# Patient Record
Sex: MALE | Race: OTHER | HISPANIC OR LATINO | Employment: STUDENT | URBAN - METROPOLITAN AREA
[De-identification: names, ages, dates, MRNs, and addresses within clinical notes are randomized per-mention and may not be internally consistent; named-entity substitution may affect disease eponyms.]

---

## 2017-02-22 ENCOUNTER — ALLSCRIPTS OFFICE VISIT (OUTPATIENT)
Dept: OTHER | Facility: OTHER | Age: 32
End: 2017-02-22

## 2017-02-22 DIAGNOSIS — Z13.6 ENCOUNTER FOR SCREENING FOR CARDIOVASCULAR DISORDERS: ICD-10-CM

## 2017-04-14 ENCOUNTER — GENERIC CONVERSION - ENCOUNTER (OUTPATIENT)
Dept: OTHER | Facility: OTHER | Age: 32
End: 2017-04-14

## 2017-04-14 ENCOUNTER — TRANSCRIBE ORDERS (OUTPATIENT)
Dept: LAB | Facility: CLINIC | Age: 32
End: 2017-04-14

## 2017-04-14 ENCOUNTER — APPOINTMENT (OUTPATIENT)
Dept: LAB | Facility: CLINIC | Age: 32
End: 2017-04-14
Payer: COMMERCIAL

## 2017-04-14 DIAGNOSIS — Z00.00 ENCOUNTER FOR GENERAL ADULT MEDICAL EXAMINATION WITHOUT ABNORMAL FINDINGS: ICD-10-CM

## 2017-04-14 LAB — RUBV IGG SERPL IA-ACNC: 131.2 IU/ML

## 2017-04-14 PROCEDURE — 86706 HEP B SURFACE ANTIBODY: CPT

## 2017-04-14 PROCEDURE — 86803 HEPATITIS C AB TEST: CPT

## 2017-04-14 PROCEDURE — 36415 COLL VENOUS BLD VENIPUNCTURE: CPT

## 2017-04-14 PROCEDURE — 86787 VARICELLA-ZOSTER ANTIBODY: CPT

## 2017-04-14 PROCEDURE — 86735 MUMPS ANTIBODY: CPT

## 2017-04-14 PROCEDURE — 86762 RUBELLA ANTIBODY: CPT

## 2017-04-14 PROCEDURE — 86704 HEP B CORE ANTIBODY TOTAL: CPT

## 2017-04-14 PROCEDURE — 86765 RUBEOLA ANTIBODY: CPT

## 2017-04-15 LAB
HBV CORE AB SER QL: NORMAL
HBV SURFACE AB SER-ACNC: 791.24 MIU/ML
HCV AB SER QL: NORMAL

## 2017-04-17 ENCOUNTER — GENERIC CONVERSION - ENCOUNTER (OUTPATIENT)
Dept: OTHER | Facility: OTHER | Age: 32
End: 2017-04-17

## 2017-04-18 LAB
MEV IGG SER QL: NORMAL
MUV IGG SER QL: NORMAL
VZV IGG SER IA-ACNC: NORMAL

## 2017-07-20 ENCOUNTER — APPOINTMENT (OUTPATIENT)
Dept: LAB | Facility: CLINIC | Age: 32
End: 2017-07-20
Payer: COMMERCIAL

## 2017-07-20 ENCOUNTER — TRANSCRIBE ORDERS (OUTPATIENT)
Dept: LAB | Facility: CLINIC | Age: 32
End: 2017-07-20

## 2017-07-20 DIAGNOSIS — Z00.8 HEALTH EXAMINATION IN POPULATION SURVEY: Primary | ICD-10-CM

## 2017-07-20 LAB
CHOLEST SERPL-MCNC: 185 MG/DL (ref 50–200)
EST. AVERAGE GLUCOSE BLD GHB EST-MCNC: 123 MG/DL
HBA1C MFR BLD: 5.9 % (ref 4.2–6.3)
HDLC SERPL-MCNC: 35 MG/DL (ref 40–60)
LDLC SERPL CALC-MCNC: 109 MG/DL (ref 0–100)
TRIGL SERPL-MCNC: 203 MG/DL

## 2017-07-20 PROCEDURE — 80061 LIPID PANEL: CPT

## 2017-07-20 PROCEDURE — 83036 HEMOGLOBIN GLYCOSYLATED A1C: CPT

## 2017-07-20 PROCEDURE — 36415 COLL VENOUS BLD VENIPUNCTURE: CPT

## 2017-08-07 ENCOUNTER — ALLSCRIPTS OFFICE VISIT (OUTPATIENT)
Dept: OTHER | Facility: OTHER | Age: 32
End: 2017-08-07

## 2017-11-03 ENCOUNTER — APPOINTMENT (OUTPATIENT)
Dept: RADIOLOGY | Facility: MEDICAL CENTER | Age: 32
End: 2017-11-03
Payer: COMMERCIAL

## 2017-11-03 ENCOUNTER — TRANSCRIBE ORDERS (OUTPATIENT)
Dept: ADMINISTRATIVE | Facility: HOSPITAL | Age: 32
End: 2017-11-03

## 2017-11-03 ENCOUNTER — ALLSCRIPTS OFFICE VISIT (OUTPATIENT)
Dept: OTHER | Facility: OTHER | Age: 32
End: 2017-11-03

## 2017-11-03 ENCOUNTER — GENERIC CONVERSION - ENCOUNTER (OUTPATIENT)
Dept: OTHER | Facility: OTHER | Age: 32
End: 2017-11-03

## 2017-11-03 DIAGNOSIS — M12.539: ICD-10-CM

## 2017-11-03 PROCEDURE — 73110 X-RAY EXAM OF WRIST: CPT

## 2017-11-04 NOTE — PROGRESS NOTES
Assessment  1  Traumatic arthritis of wrist (716 13) (M12 539)    Plan  Traumatic arthritis of wrist    · Diclofenac Sodium 75 MG Oral Tablet Delayed Release; TAKE 1 TABLET Twice  daily with food   · * XR WRIST 3+ VIEW RIGHT; Status:Active; Requested PEN:[id number];     Discussion/Summary    1  Traumatic arthropathy of the right wrist-recommend x-ray stat to rule out fracture  He has very limited range of motion and swelling  We will start on diclofenac 75 mg twice daily and consider referral to hand specialist if might not better or depending on x-ray results  Chief Complaint  pt is here for right wrist pain since tuesday   he states he attempted to pick his daughter tuesday and experienced a throbbing pain and loss of range of motion   cd      History of Present Illness  HPI: This is a 57-year-old gentleman that presents to the office with history is of arthritis in his wrist  He states that he has had previous flare-ups that seem to come and go however he has tried all of his normal regimens with icy Hot and NSAIDs which have not helped  He states that he was 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 the other day, moving his washing was seen [employer] and in have a problem until he lifted his daughter to put her on the couch  She is only 3years old about 20 lb but he felt a sudden pop and snap in his wrist and has had trouble moving it since  It has become red and swollen immediately  He does not have any numbness or tingling in his fingers  Review of Systems    Constitutional: no fever,-- not feeling poorly,-- no chills-- and-- not feeling tired  ENT: no nosebleeds-- and-- no nasal discharge  Cardiovascular: no chest pain-- and-- no palpitations  Respiratory: no shortness of breath,-- no cough-- and-- no wheezing  Gastrointestinal: no abdominal pain-- and-- no constipation  Musculoskeletal: no arthralgias,-- no joint swelling,-- no myalgias-- and-- no joint stiffness     Neurological: no headache-- and-- no confusion  Active Problems  1  Elevated blood pressure reading (796 2) (R03 0)   2  Myalgia (729 1) (M79 1)   3  Screening for heart disease (V81 2) (Z13 6)   4  Weight gain (783 1) (R63 5)    Past Medical History  Active Problems And Past Medical History Reviewed: The active problems and past medical history were reviewed and updated today  Social History   ·    · No alcohol use   · No secondhand smoke exposure (V49 89) (Z78 9)   · Non-smoker (V49 89) (Z78 9)   · Student    Current Meds   1  Glucos-Chondroit-MSM Complex Oral Tablet Recorded   2  Vitamin C ER 1000 MG Oral Tablet Extended Release Recorded    The medication list was reviewed and updated today  Allergies  1  No Known Drug Allergies    Vitals   Recorded: [id number] 10:33AM   Heart Rate 76, L Radial   Pulse Quality Regular, L Radial   Systolic 282, LUE, Sitting   Diastolic 80, LUE, Sitting   Height 6 ft 3 in   Weight 328 lb 6 4 oz   BMI Calculated 41 05   BSA Calculated 2 71     Physical Exam    Constitutional   General appearance: No acute distress, well appearing and well nourished  Eyes   Conjunctiva and lids: No swelling, erythema, or discharge  Pupils and irises: Equal, round and reactive to light  Ears, Nose, Mouth, and Throat   External inspection of ears and nose: Normal     Otoscopic examination: Tympanic membrance translucent with normal light reflex  Canals patent without erythema  Nasal mucosa, septum, and turbinates: Normal without edema or erythema  Oropharynx: Normal with no erythema, edema, exudate or lesions  Pulmonary   Respiratory effort: No increased work of breathing or signs of respiratory distress  Auscultation of lungs: Clear to auscultation, equal breath sounds bilaterally, no wheezes, no rales, no rhonci  Cardiovascular   Auscultation of heart: Normal rate and rhythm, normal S1 and S2, without murmurs      Examination of extremities for edema and/or varicosities: Normal     Carotid pulses: Normal     Abdomen   Abdomen: Non-tender, no masses  Liver and spleen: No hepatomegaly or splenomegaly  Lymphatic   Palpation of lymph nodes in neck: No lymphadenopathy  Musculoskeletal   Gait and station: Normal     Digits and nails: Normal without clubbing or cyanosis  Inspection/palpation of joints, bones, and muscles: Abnormal  -- Very limited range of motion with the right wrist  There is tenderness to palpation along the distal ulnar aspect  Skin   Skin and subcutaneous tissue: Abnormal  -- Erythema and edema of the right wrist present  Neurologic   Reflexes: 2+ and symmetric  Sensation: No sensory loss  Psychiatric   Orientation to person, place and time: Normal     Mood and affect: Normal          Signatures   Electronically signed by : Yvette Lyons [facility]; Nov  3 2017 10:51AM EST                       (Author)    Electronically signed by :  BREANNE De La Cruz ; Nov  3 2017 11:18AM EST

## 2018-01-09 NOTE — RESULT NOTES
Verified Results  * XR WRIST 3+ VIEW RIGHT [id number] 11:29AM Felicity [place] Order Number: [id number]     Test Name Result Flag Reference   XR WRIST 3+ VW RIGHT (Report)     RIGHT WRIST     INDICATION:  Traumatic arthropathy of wrist  Pain distal ulna  COMPARISON: None     VIEWS: PA, lateral, and oblique     IMAGES: 3     FINDINGS:     There is no acute fracture or dislocation  No degenerative changes  No lytic or blastic lesions are seen  Soft tissues are unremarkable  IMPRESSION:     No acute osseous abnormality         Workstation performed: [id number]     Signed by:   Hever Odom DO   11/3/17

## 2018-01-12 VITALS
BODY MASS INDEX: 38.42 KG/M2 | SYSTOLIC BLOOD PRESSURE: 102 MMHG | HEIGHT: 75 IN | DIASTOLIC BLOOD PRESSURE: 84 MMHG | HEART RATE: 76 BPM | WEIGHT: 309 LBS

## 2018-01-12 VITALS
HEIGHT: 75 IN | SYSTOLIC BLOOD PRESSURE: 120 MMHG | DIASTOLIC BLOOD PRESSURE: 80 MMHG | BODY MASS INDEX: 39.17 KG/M2 | HEART RATE: 76 BPM | WEIGHT: 315 LBS

## 2018-01-13 VITALS
HEIGHT: 75 IN | BODY MASS INDEX: 39.17 KG/M2 | WEIGHT: 315 LBS | SYSTOLIC BLOOD PRESSURE: 118 MMHG | DIASTOLIC BLOOD PRESSURE: 84 MMHG | HEART RATE: 80 BPM

## 2018-01-15 NOTE — RESULT NOTES
Verified Results  (1) HEP C ANTIBODY [id number] 08:40AM [place] Order Number: [id number]     Test Name Result Flag Reference   HEPATITIS C ANTIBODY Non-reactive  Non-reactive

## 2018-01-16 NOTE — MISCELLANEOUS
Message  Return to work or school:   Nikkie Ureña is under my professional care  He was seen in my office on 11/3/17       Please excuse for Dr Ephraim Chase today 11/3/17  RUBIA DEL VALLE PA-C  Signatures   Electronically signed by :  Miguel Peters, ; Nov  3 2017 10:49AM EST                       (Author)

## 2018-01-22 VITALS
HEART RATE: 74 BPM | HEIGHT: 75 IN | SYSTOLIC BLOOD PRESSURE: 124 MMHG | BODY MASS INDEX: 39.17 KG/M2 | DIASTOLIC BLOOD PRESSURE: 88 MMHG | WEIGHT: 315 LBS

## 2018-02-21 ENCOUNTER — TELEPHONE (OUTPATIENT)
Dept: FAMILY MEDICINE CLINIC | Facility: CLINIC | Age: 33
End: 2018-02-21

## 2018-02-21 DIAGNOSIS — G89.29 CHRONIC WRIST PAIN, UNSPECIFIED LATERALITY: Primary | ICD-10-CM

## 2018-02-21 DIAGNOSIS — M25.539 CHRONIC WRIST PAIN, UNSPECIFIED LATERALITY: Primary | ICD-10-CM

## 2018-02-21 RX ORDER — DICLOFENAC SODIUM 75 MG/1
75 TABLET, DELAYED RELEASE ORAL 2 TIMES DAILY
Qty: 30 TABLET | Refills: 0 | Status: SHIPPED | OUTPATIENT
Start: 2018-02-21 | End: 2018-08-06 | Stop reason: SDUPTHER

## 2018-02-21 RX ORDER — DICLOFENAC SODIUM 75 MG/1
1 TABLET, DELAYED RELEASE ORAL 2 TIMES DAILY
COMMUNITY
Start: 2017-11-03 | End: 2018-02-21 | Stop reason: SDUPTHER

## 2018-02-21 NOTE — TELEPHONE ENCOUNTER
Pt notified his request has been approved  Declines ortho consult at present but states if pain returns he will consider it

## 2018-02-21 NOTE — TELEPHONE ENCOUNTER
Patient called because he said that he saw Leni Turner in November 2017 for wrist pain and was prescribed Diclosenac 75 mg which he finished, but he is now with the wrist pain again and asked if Leni Turner would give him a refill of the medication that was prescribed for him? Call patient to let him know if this can be done

## 2018-08-06 DIAGNOSIS — G89.29 CHRONIC WRIST PAIN, UNSPECIFIED LATERALITY: ICD-10-CM

## 2018-08-06 DIAGNOSIS — M25.539 CHRONIC WRIST PAIN, UNSPECIFIED LATERALITY: ICD-10-CM

## 2018-08-06 RX ORDER — DICLOFENAC SODIUM 75 MG/1
75 TABLET, DELAYED RELEASE ORAL 2 TIMES DAILY
Qty: 30 TABLET | Refills: 0 | Status: SHIPPED | OUTPATIENT
Start: 2018-08-06 | End: 2018-08-18 | Stop reason: SDUPTHER

## 2018-08-18 DIAGNOSIS — G89.29 CHRONIC WRIST PAIN, UNSPECIFIED LATERALITY: ICD-10-CM

## 2018-08-18 DIAGNOSIS — M25.539 CHRONIC WRIST PAIN, UNSPECIFIED LATERALITY: ICD-10-CM

## 2018-08-20 RX ORDER — DICLOFENAC SODIUM 75 MG/1
75 TABLET, DELAYED RELEASE ORAL 2 TIMES DAILY
Qty: 30 TABLET | Refills: 0 | Status: SHIPPED | OUTPATIENT
Start: 2018-08-20 | End: 2018-09-19 | Stop reason: SDUPTHER

## 2018-09-04 DIAGNOSIS — M25.539 CHRONIC WRIST PAIN, UNSPECIFIED LATERALITY: ICD-10-CM

## 2018-09-04 DIAGNOSIS — G89.29 CHRONIC WRIST PAIN, UNSPECIFIED LATERALITY: ICD-10-CM

## 2018-09-04 RX ORDER — DICLOFENAC SODIUM 75 MG/1
75 TABLET, DELAYED RELEASE ORAL 2 TIMES DAILY
Qty: 30 TABLET | Refills: 0 | OUTPATIENT
Start: 2018-09-04 | End: 2018-09-19

## 2018-09-19 DIAGNOSIS — G89.29 CHRONIC WRIST PAIN, UNSPECIFIED LATERALITY: ICD-10-CM

## 2018-09-19 DIAGNOSIS — M25.539 CHRONIC WRIST PAIN, UNSPECIFIED LATERALITY: ICD-10-CM

## 2018-09-19 RX ORDER — DICLOFENAC SODIUM 75 MG/1
75 TABLET, DELAYED RELEASE ORAL 2 TIMES DAILY
Qty: 30 TABLET | Refills: 0 | Status: SHIPPED | OUTPATIENT
Start: 2018-09-19 | End: 2018-10-07 | Stop reason: SDUPTHER

## 2018-09-28 ENCOUNTER — APPOINTMENT (OUTPATIENT)
Dept: LAB | Facility: CLINIC | Age: 33
End: 2018-09-28
Payer: COMMERCIAL

## 2018-09-28 ENCOUNTER — OFFICE VISIT (OUTPATIENT)
Dept: FAMILY MEDICINE CLINIC | Facility: CLINIC | Age: 33
End: 2018-09-28
Payer: COMMERCIAL

## 2018-09-28 VITALS
RESPIRATION RATE: 20 BRPM | HEIGHT: 75 IN | HEART RATE: 80 BPM | SYSTOLIC BLOOD PRESSURE: 122 MMHG | WEIGHT: 315 LBS | BODY MASS INDEX: 39.17 KG/M2 | DIASTOLIC BLOOD PRESSURE: 78 MMHG

## 2018-09-28 DIAGNOSIS — Z00.00 HEALTH CARE MAINTENANCE: Primary | ICD-10-CM

## 2018-09-28 DIAGNOSIS — Z00.00 HEALTH CARE MAINTENANCE: ICD-10-CM

## 2018-09-28 PROBLEM — R03.0 ELEVATED BLOOD PRESSURE READING: Status: ACTIVE | Noted: 2017-08-07

## 2018-09-28 LAB
ANION GAP SERPL CALCULATED.3IONS-SCNC: 5 MMOL/L (ref 4–13)
BUN SERPL-MCNC: 22 MG/DL (ref 5–25)
CALCIUM SERPL-MCNC: 9.3 MG/DL (ref 8.3–10.1)
CHLORIDE SERPL-SCNC: 106 MMOL/L (ref 100–108)
CHOLEST SERPL-MCNC: 181 MG/DL (ref 50–200)
CO2 SERPL-SCNC: 25 MMOL/L (ref 21–32)
CREAT SERPL-MCNC: 1.58 MG/DL (ref 0.6–1.3)
GFR SERPL CREATININE-BSD FRML MDRD: 56 ML/MIN/1.73SQ M
GLUCOSE P FAST SERPL-MCNC: 101 MG/DL (ref 65–99)
HDLC SERPL-MCNC: 27 MG/DL (ref 40–60)
LDLC SERPL CALC-MCNC: 88 MG/DL (ref 0–100)
POTASSIUM SERPL-SCNC: 4.4 MMOL/L (ref 3.5–5.3)
SODIUM SERPL-SCNC: 136 MMOL/L (ref 136–145)
TRIGL SERPL-MCNC: 332 MG/DL

## 2018-09-28 PROCEDURE — 36415 COLL VENOUS BLD VENIPUNCTURE: CPT | Performed by: PHYSICIAN ASSISTANT

## 2018-09-28 PROCEDURE — 99395 PREV VISIT EST AGE 18-39: CPT | Performed by: PHYSICIAN ASSISTANT

## 2018-09-28 PROCEDURE — 80048 BASIC METABOLIC PNL TOTAL CA: CPT

## 2018-09-28 PROCEDURE — 80061 LIPID PANEL: CPT | Performed by: PHYSICIAN ASSISTANT

## 2018-09-28 NOTE — PATIENT INSTRUCTIONS
1  Health maintenance-biometric screening for work to be completed  Lipids and glucose will be ordered  Labs will be drawn today since patient is fasting  Physical exam was within normal limits  Physical form will be retained in the red nursing folder

## 2018-09-28 NOTE — PROGRESS NOTES
Assessment/Plan:  Patient Instructions   1  Health maintenance-biometric screening for work to be completed  Lipids and glucose will be ordered  Labs will be drawn today since patient is fasting  Physical exam was within normal limits  Physical form will be retained in the red nursing folder  Diagnoses and all orders for this visit:    Health care maintenance  -     Lipid Panel with Direct LDL reflex  -     Basic metabolic panel; Future          Subjective: Pt here for a wellness physical  R Davonte     Patient ID: Lisandra Ayala is a 26 y o  male  HPI:  This is a 29-year-old gentleman that presents to the office for health maintenance screening  He is feeling well without any acute complaints  He does have a biometric screening form for work to be completed  This requires cholesterol and sugar be ordered  He is not aware of any family history of diabetes  There is some hypertension in the family present  No other early heart disease or strokes that he is aware of are present in the family  The following portions of the patient's history were reviewed and updated as appropriate: allergies, current medications, past family history, past medical history, past social history, past surgical history and problem list     Review of Systems   Constitutional: Negative for chills, fatigue and fever  HENT: Negative for congestion, ear pain and sinus pressure  Eyes: Negative for visual disturbance  Respiratory: Negative for cough, chest tightness and shortness of breath  Cardiovascular: Negative for chest pain and palpitations  Gastrointestinal: Negative for diarrhea, nausea and vomiting  Endocrine: Negative for polyuria  Genitourinary: Negative for dysuria and frequency  Musculoskeletal: Negative for arthralgias and myalgias  Skin: Negative for pallor and rash  Neurological: Negative for dizziness, weakness, light-headedness, numbness and headaches     Psychiatric/Behavioral: Negative for agitation, behavioral problems and sleep disturbance  All other systems reviewed and are negative  Objective:  Vitals:    09/28/18 0929   BP: 122/78   BP Location: Left arm   Patient Position: Sitting   Cuff Size: Large   Pulse: 80   Resp: 20   Weight: (!) 149 kg (328 lb)   Height: 6' 3" (1 905 m)            Physical Exam   Constitutional: He is oriented to person, place, and time  He appears well-developed and well-nourished  No distress  HENT:   Head: Normocephalic and atraumatic  Right Ear: External ear normal    Left Ear: External ear normal    Nose: Nose normal    Mouth/Throat: Oropharynx is clear and moist  No oropharyngeal exudate  Eyes: Pupils are equal, round, and reactive to light  Conjunctivae and EOM are normal    Neck: Normal range of motion  Neck supple  No tracheal deviation present  No thyromegaly present  Cardiovascular: Normal rate, regular rhythm and normal heart sounds  Exam reveals no friction rub  No murmur heard  Pulmonary/Chest: Effort normal and breath sounds normal  No respiratory distress  He has no wheezes  He has no rales  Abdominal: Soft  Bowel sounds are normal  He exhibits no distension  There is no tenderness  There is no rebound and no guarding  Musculoskeletal: Normal range of motion  He exhibits no edema or tenderness  Lymphadenopathy:     He has no cervical adenopathy  Neurological: He is alert and oriented to person, place, and time  No cranial nerve deficit  Coordination normal    Skin: Skin is warm and dry  No rash noted  No erythema  Psychiatric: He has a normal mood and affect  His behavior is normal  Thought content normal    Nursing note and vitals reviewed

## 2018-10-07 DIAGNOSIS — M25.539 CHRONIC WRIST PAIN, UNSPECIFIED LATERALITY: ICD-10-CM

## 2018-10-07 DIAGNOSIS — G89.29 CHRONIC WRIST PAIN, UNSPECIFIED LATERALITY: ICD-10-CM

## 2018-10-08 RX ORDER — DICLOFENAC SODIUM 75 MG/1
75 TABLET, DELAYED RELEASE ORAL 2 TIMES DAILY
Qty: 30 TABLET | Refills: 0 | Status: SHIPPED | OUTPATIENT
Start: 2018-10-08 | End: 2018-11-27

## 2018-10-22 DIAGNOSIS — G89.29 CHRONIC WRIST PAIN, UNSPECIFIED LATERALITY: ICD-10-CM

## 2018-10-22 DIAGNOSIS — M25.539 CHRONIC WRIST PAIN, UNSPECIFIED LATERALITY: ICD-10-CM

## 2018-10-22 RX ORDER — DICLOFENAC SODIUM 75 MG/1
75 TABLET, DELAYED RELEASE ORAL 2 TIMES DAILY
Qty: 30 TABLET | Refills: 0 | Status: SHIPPED | OUTPATIENT
Start: 2018-10-22 | End: 2019-02-19

## 2018-11-27 ENCOUNTER — OFFICE VISIT (OUTPATIENT)
Dept: FAMILY MEDICINE CLINIC | Facility: CLINIC | Age: 33
End: 2018-11-27
Payer: COMMERCIAL

## 2018-11-27 VITALS
WEIGHT: 315 LBS | DIASTOLIC BLOOD PRESSURE: 68 MMHG | BODY MASS INDEX: 39.17 KG/M2 | HEART RATE: 84 BPM | HEIGHT: 75 IN | SYSTOLIC BLOOD PRESSURE: 120 MMHG

## 2018-11-27 DIAGNOSIS — M25.562 LEFT KNEE PAIN, UNSPECIFIED CHRONICITY: Primary | ICD-10-CM

## 2018-11-27 PROCEDURE — 3008F BODY MASS INDEX DOCD: CPT | Performed by: PHYSICIAN ASSISTANT

## 2018-11-27 PROCEDURE — 1036F TOBACCO NON-USER: CPT | Performed by: PHYSICIAN ASSISTANT

## 2018-11-27 PROCEDURE — 99214 OFFICE O/P EST MOD 30 MIN: CPT | Performed by: PHYSICIAN ASSISTANT

## 2018-11-27 NOTE — PROGRESS NOTES
Assessment/Plan:  Patient Instructions   1  Left knee pain-pain is improved over the past 6 days since onset but still persistent  Will assess x-ray, rule out effusion, Baker's cyst, arthritic changes  I do consider medial meniscus tear a likelihood  If symptoms persist and do not improve with NSAIDs and conservative treatments will order MRI as necessary and orthopedic evaluation  He is to continue prescription diclofenac 75 mg twice daily  Patient verbalizes agreement and understanding of plan  Diagnoses and all orders for this visit:    Left knee pain, unspecified chronicity  -     XR knee 4+ vw left injury; Future          Subjective:   C/o left knee pain started 6 days ago  The pain started in the back of the knee and progressed  Can bend it now but still has slight pain  Hx of arthritis in other joints  mjs     Patient ID: Glenna White is a 26 y o  male  HPI:  This is a 78-year-old gentleman that presents to the office with pain that has been bothering him in his left knee for the past 6 days  He does not recall any injury to the area or abrupt onset  He just states over the course of the day he had difficulty moving it he was so painful  He had been using diclofenac twice daily which he had previously used for a wrist injury as well as icing it  It has gotten somewhat better over the past 6 days and he now has a more full range of motion but is still having some tenderness behind the knee  He has not had any swelling of the lower extremity, numbness, or tingling present  He has not felt the knee to be unstable or give out on him  The following portions of the patient's history were reviewed and updated as appropriate: allergies, current medications, past family history, past medical history, past social history, past surgical history and problem list     Review of Systems   Constitutional: Negative for chills, fatigue and fever     HENT: Negative for congestion, ear pain and sinus pressure  Eyes: Negative for visual disturbance  Respiratory: Negative for cough, chest tightness and shortness of breath  Cardiovascular: Negative for chest pain and palpitations  Gastrointestinal: Negative for diarrhea, nausea and vomiting  Endocrine: Negative for polyuria  Genitourinary: Negative for dysuria and frequency  Musculoskeletal: Positive for arthralgias  Negative for myalgias  Skin: Negative for pallor and rash  Neurological: Negative for dizziness, weakness, light-headedness, numbness and headaches  Psychiatric/Behavioral: Negative for agitation, behavioral problems and sleep disturbance  All other systems reviewed and are negative  Objective:      /68   Pulse 84   Ht 6' 3" (1 905 m)   Wt (!) 150 kg (330 lb)   BMI 41 25 kg/m²          Physical Exam   Constitutional: He is oriented to person, place, and time  He appears well-developed and well-nourished  HENT:   Head: Normocephalic  Cardiovascular: Normal rate and regular rhythm  Pulmonary/Chest: Effort normal and breath sounds normal  No respiratory distress  Abdominal: Soft  Musculoskeletal: Normal range of motion  Possible minimal fluid effusion of the knee present  No posterior popliteal tenderness to palpation  Neurological: He is alert and oriented to person, place, and time  Sensation intact to light touch distal lower extremities bilaterally  Skin:   Capillary refill at the toes is 1-2 seconds  Psychiatric: He has a normal mood and affect  Nursing note and vitals reviewed

## 2018-11-27 NOTE — PATIENT INSTRUCTIONS
1   Left knee pain-pain is improved over the past 6 days since onset but still persistent  Will assess x-ray, rule out effusion, Baker's cyst, arthritic changes  I do consider medial meniscus tear a likelihood  If symptoms persist and do not improve with NSAIDs and conservative treatments will order MRI as necessary and orthopedic evaluation  He is to continue prescription diclofenac 75 mg twice daily  Patient verbalizes agreement and understanding of plan

## 2018-12-01 DIAGNOSIS — M25.539 CHRONIC WRIST PAIN, UNSPECIFIED LATERALITY: ICD-10-CM

## 2018-12-01 DIAGNOSIS — G89.29 CHRONIC WRIST PAIN, UNSPECIFIED LATERALITY: ICD-10-CM

## 2018-12-03 RX ORDER — DICLOFENAC SODIUM 75 MG/1
75 TABLET, DELAYED RELEASE ORAL 2 TIMES DAILY
Qty: 30 TABLET | Refills: 0 | Status: SHIPPED | OUTPATIENT
Start: 2018-12-03 | End: 2018-12-27 | Stop reason: SDUPTHER

## 2018-12-27 DIAGNOSIS — G89.29 CHRONIC WRIST PAIN, UNSPECIFIED LATERALITY: ICD-10-CM

## 2018-12-27 DIAGNOSIS — M25.539 CHRONIC WRIST PAIN, UNSPECIFIED LATERALITY: ICD-10-CM

## 2018-12-27 RX ORDER — DICLOFENAC SODIUM 75 MG/1
75 TABLET, DELAYED RELEASE ORAL 2 TIMES DAILY
Qty: 30 TABLET | Refills: 0 | Status: SHIPPED | OUTPATIENT
Start: 2018-12-27 | End: 2019-01-03 | Stop reason: SDUPTHER

## 2019-01-03 DIAGNOSIS — M25.539 CHRONIC WRIST PAIN, UNSPECIFIED LATERALITY: ICD-10-CM

## 2019-01-03 DIAGNOSIS — G89.29 CHRONIC WRIST PAIN, UNSPECIFIED LATERALITY: ICD-10-CM

## 2019-01-04 RX ORDER — DICLOFENAC SODIUM 75 MG/1
75 TABLET, DELAYED RELEASE ORAL 2 TIMES DAILY
Qty: 180 TABLET | Refills: 0 | Status: SHIPPED | OUTPATIENT
Start: 2019-01-04 | End: 2019-03-31 | Stop reason: SDUPTHER

## 2019-02-13 ENCOUNTER — OFFICE VISIT (OUTPATIENT)
Dept: FAMILY MEDICINE CLINIC | Facility: CLINIC | Age: 34
End: 2019-02-13
Payer: COMMERCIAL

## 2019-02-13 VITALS
WEIGHT: 315 LBS | DIASTOLIC BLOOD PRESSURE: 74 MMHG | SYSTOLIC BLOOD PRESSURE: 138 MMHG | HEART RATE: 76 BPM | HEIGHT: 75 IN | BODY MASS INDEX: 39.17 KG/M2

## 2019-02-13 DIAGNOSIS — S39.012A STRAIN OF BACK, INITIAL ENCOUNTER: Primary | ICD-10-CM

## 2019-02-13 DIAGNOSIS — M54.30 SCIATIC LEG PAIN: ICD-10-CM

## 2019-02-13 DIAGNOSIS — G89.29 CHRONIC WRIST PAIN, UNSPECIFIED LATERALITY: ICD-10-CM

## 2019-02-13 DIAGNOSIS — M25.539 CHRONIC WRIST PAIN, UNSPECIFIED LATERALITY: ICD-10-CM

## 2019-02-13 PROCEDURE — 99214 OFFICE O/P EST MOD 30 MIN: CPT | Performed by: PHYSICIAN ASSISTANT

## 2019-02-13 RX ORDER — METHOCARBAMOL 750 MG/1
750 TABLET, FILM COATED ORAL EVERY 6 HOURS PRN
Qty: 30 TABLET | Refills: 1 | Status: SHIPPED | OUTPATIENT
Start: 2019-02-13 | End: 2019-08-15 | Stop reason: SDUPTHER

## 2019-02-13 NOTE — PROGRESS NOTES
Assessment and Plan:    Problem List Items Addressed This Visit        Nervous and Auditory    Sciatic leg pain    Relevant Medications    methocarbamol (ROBAXIN) 750 mg tablet       Musculoskeletal and Integument    Strain of back, initial encounter - Primary     PT to continue diclofenac 75 mg twice daily with food and will add robaxin 750 mg one twice up to 3 times a day as needed  Heat to low back  No evidence of disc involvement  Relevant Medications    methocarbamol (ROBAXIN) 750 mg tablet      Other Visit Diagnoses     Chronic wrist pain, unspecified laterality                 Diagnoses and all orders for this visit:    Strain of back, initial encounter  -     methocarbamol (ROBAXIN) 750 mg tablet; Take 1 tablet (750 mg total) by mouth every 6 (six) hours as needed for muscle spasms for up to 14 days    Chronic wrist pain, unspecified laterality    Sciatic leg pain  -     methocarbamol (ROBAXIN) 750 mg tablet; Take 1 tablet (750 mg total) by mouth every 6 (six) hours as needed for muscle spasms for up to 14 days              Subjective:      Patient ID: Claudia Edwards is a 26 y o  male  CC:    Chief Complaint   Patient presents with    Back Pain     x 2 days  Pain starts in back and goes down left leg   Leg Pain     denies injury  HPI:    Monday started with pain in his back and leg when he woke up gettign worse throughout the day and yesterday worse  Unable to go to work today  Over the weekend he does not remember doing anything that would have caused an injury  Back pain started on Sunday  Lower L  Pain from back goes into L buttox into his thigh to the mid calf  Unable to place wt on this leg  No numbness or tingling just pain  Tried aleve, heat  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  Does not sit on his wallet in his back pocket         The following portions of the patient's history were reviewed and updated as appropriate: allergies, current medications, past family history, past medical history, past social history, past surgical history and problem list       Review of Systems   Constitutional: Negative  HENT: Negative  Eyes: Negative  Respiratory: Negative  Cardiovascular: Negative  Gastrointestinal: Negative  Endocrine: Negative  Genitourinary: Negative  Musculoskeletal: Positive for back pain  Leg pain   Skin: Negative  Allergic/Immunologic: Negative  Neurological: Negative  Hematological: Negative  Psychiatric/Behavioral: Negative  Data to review:       Objective:    Vitals:    02/13/19 1214   BP: 138/74   BP Location: Left arm   Patient Position: Lying right side   Pulse: 76   Weight: (!) 150 kg (329 lb 9 6 oz)   Height: 6' 3" (1 905 m)        Physical Exam   Constitutional: He is oriented to person, place, and time  He appears well-developed and well-nourished  No distress  HENT:   Head: Normocephalic and atraumatic  Eyes: Conjunctivae are normal  Right eye exhibits no discharge  Left eye exhibits no discharge  Neck: Carotid bruit is not present  Cardiovascular: Normal rate, regular rhythm and normal heart sounds  Exam reveals no gallop and no friction rub  No murmur heard  Pulmonary/Chest: Effort normal and breath sounds normal  No respiratory distress  He has no wheezes  He has no rales  Musculoskeletal:   Pain to palpation of the left lower paravertebral musculature  Positive left sciatic notch tenderness  Good bilateral lower extremity strength and range of motion  Negative straight leg raise bilaterally  Normal deep tendon reflexes lower extremities  Neurological: He is alert and oriented to person, place, and time  Skin: Skin is warm and dry  He is not diaphoretic  Psychiatric: He has a normal mood and affect  Judgment normal    Nursing note and vitals reviewed

## 2019-02-13 NOTE — LETTER
February 13, 2019     Patient: Kizzy Mascorro   YOB: 1985   Date of Visit: 2/13/2019       To Whom it May Concern:    Kizzy Mascorro is under my professional care  He was seen in my office on 2/13/2019  He may return to work on 2/18/19, sooner if able       If you have any questions or concerns, please don't hesitate to call           Sincerely,          Marianna Huff PA-C        CC: No Recipients

## 2019-02-13 NOTE — PATIENT INSTRUCTIONS
Problem List Items Addressed This Visit        Nervous and Auditory    Sciatic leg pain    Relevant Medications    methocarbamol (ROBAXIN) 750 mg tablet       Musculoskeletal and Integument    Strain of back, initial encounter - Primary     PT to continue diclofenac 75 mg twice daily with food and will add robaxin 750 mg one twice up to 3 times a day as needed  Heat to low back  No evidence of disc involvement             Relevant Medications    methocarbamol (ROBAXIN) 750 mg tablet      Other Visit Diagnoses     Chronic wrist pain, unspecified laterality

## 2019-02-13 NOTE — ASSESSMENT & PLAN NOTE
PT to continue diclofenac 75 mg twice daily with food and will add robaxin 750 mg one twice up to 3 times a day as needed  Heat to low back  No evidence of disc involvement

## 2019-02-19 ENCOUNTER — OFFICE VISIT (OUTPATIENT)
Dept: FAMILY MEDICINE CLINIC | Facility: CLINIC | Age: 34
End: 2019-02-19
Payer: COMMERCIAL

## 2019-02-19 VITALS
WEIGHT: 315 LBS | DIASTOLIC BLOOD PRESSURE: 78 MMHG | BODY MASS INDEX: 39.17 KG/M2 | RESPIRATION RATE: 16 BRPM | SYSTOLIC BLOOD PRESSURE: 122 MMHG | HEIGHT: 75 IN | HEART RATE: 88 BPM

## 2019-02-19 DIAGNOSIS — M54.42 ACUTE MIDLINE LOW BACK PAIN WITH LEFT-SIDED SCIATICA: Primary | ICD-10-CM

## 2019-02-19 DIAGNOSIS — M54.30 SCIATIC LEG PAIN: ICD-10-CM

## 2019-02-19 PROCEDURE — 99213 OFFICE O/P EST LOW 20 MIN: CPT | Performed by: FAMILY MEDICINE

## 2019-02-19 RX ORDER — PREDNISONE 20 MG/1
40 TABLET ORAL DAILY
Qty: 10 TABLET | Refills: 0 | Status: SHIPPED | OUTPATIENT
Start: 2019-02-19 | End: 2019-02-24

## 2019-02-19 NOTE — ASSESSMENT & PLAN NOTE
Low back pain with radiculopathy to the left side, status post diclofenac and Robaxin with some help, steroid for 5 days was given, off work note was written, patient to do low back x-ray if no improvement with the steroid  To return to the office to consider physical therapy and spine specialist referral if continue with the pain

## 2019-02-19 NOTE — LETTER
February 19, 2019     Patient: Estephania Wong   YOB: 1985   Date of Visit: 2/19/2019       To Whom it May Concern:    Estephania Wong is under my professional care  He was seen in my office on 2/19/2019  He may return to work on 2/25/2019  If you have any questions or concerns, please don't hesitate to call           Sincerely,          Yari Anne MD        CC: No Recipients

## 2019-02-19 NOTE — PATIENT INSTRUCTIONS
Acute Low Back Pain   WHAT YOU NEED TO KNOW:   Acute low back pain is sudden discomfort in your lower back area that lasts for up to 6 weeks  The discomfort makes it difficult to tolerate activity  DISCHARGE INSTRUCTIONS:   Return to the emergency department if:   · You have severe pain  · You have sudden stiffness and heaviness on both buttocks down to both legs  · You have numbness or weakness in one leg, or pain in both legs  · You have numbness in your genital area or across your lower back  · You cannot control your urine or bowel movements  Contact your healthcare provider if:   · You have a fever  · You have pain at night or when you rest     · Your pain does not get better with treatment  · You have pain that worsens when you cough or sneeze  · You suddenly feel something pop or snap in your back  · You have questions or concerns about your condition or care  Medicines: The following medicines may be ordered by your healthcare provider:  · Acetaminophen  decreases pain  It is available without a doctor's order  Ask how much to take and how often to take it  Follow directions  Acetaminophen can cause liver damage if not taken correctly  · NSAIDs  help decrease swelling and pain  This medicine is available with or without a doctor's order  NSAIDs can cause stomach bleeding or kidney problems in certain people  If you take blood thinner medicine, always ask your healthcare provider if NSAIDs are safe for you  Always read the medicine label and follow directions  · Prescription pain medicine  may be given  Ask your healthcare provider how to take this medicine safely  · Muscle relaxers  decrease pain by relaxing the muscles in your lower spine  · Take your medicine as directed  Contact your healthcare provider if you think your medicine is not helping or if you have side effects  Tell him of her if you are allergic to any medicine   Keep a list of the medicines, vitamins, and herbs you take  Include the amounts, and when and why you take them  Bring the list or the pill bottles to follow-up visits  Carry your medicine list with you in case of an emergency  Self-care:   · Stay active  as much as you can without causing more pain  Bed rest could make your back pain worse  Start with some light exercises such as walking  Avoid heavy lifting until your pain is gone  Ask for more information about the activities or exercises that are right for you  · Ice  helps decrease swelling, pain, and muscle spams  Put crushed ice in a plastic bag  Cover it with a towel  Place it on your lower back for 20 to 30 minutes every 2 hours  Do this for about 2 to 3 days after your pain starts, or as directed  · Heat  helps decrease pain and muscle spasms  Start to use heat after treatment with ice has stopped  Use a small towel dampened with warm water or a heating pad, or sit in a warm bath  Apply heat on the area for 20 to 30 minutes every 2 hours for as many days as directed  Alternate heat and ice  Prevent acute low back pain:   · Use proper body mechanics  ¨ Bend at the hips and knees when you  objects  Do not bend from the waist  Use your leg muscles as you lift the load  Do not use your back  Keep the object close to your chest as you lift it  Try not to twist or lift anything above your waist     ¨ Change your position often when you stand for long periods of time  Rest one foot on a small box or footrest, and then switch to the other foot often  ¨ Try not to sit for long periods of time  When you do, sit in a straight-backed chair with your feet flat on the floor  Never reach, pull, or push while you are sitting  · Do exercises that strengthen your back muscles  Warm up before you exercise  Ask your healthcare provider the best exercises for you  · Maintain a healthy weight  Ask your healthcare provider how much you should weigh   Ask him to help you create a weight loss plan if you are overweight  Follow up with your healthcare provider as directed:  Return for a follow-up visit if you still have pain after 1 to 3 weeks of treatment  You may need to visit an orthopedist if your back pain lasts more than 12 weeks  Write down your questions so you remember to ask them during your visits  © 2017 [employer]  Information is for End User's use only and may not be sold, redistributed or otherwise used for commercial purposes  All illustrations and images included in CareNotes® are the copyrighted property of [employer]  or Graeme Young  The above information is an  only  It is not intended as medical advice for individual conditions or treatments  Talk to your doctor, nurse or pharmacist before following any medical regimen to see if it is safe and effective for you

## 2019-02-19 NOTE — PROGRESS NOTES
Assessment and Plan:    Acute midline low back pain with left-sided sciatica  Low back pain with radiculopathy to the left side, status post diclofenac and Robaxin with some help, steroid for 5 days was given, off work note was written, patient to do low back x-ray if no improvement with the steroid  To return to the office to consider physical therapy and spine specialist referral if continue with the pain  Diagnoses and all orders for this visit:    Acute midline low back pain with left-sided sciatica  -     predniSONE 20 mg tablet; Take 2 tablets (40 mg total) by mouth daily for 5 days  -     XR spine lumbar minimum 4 views non injury; Future    Sciatic leg pain  -     predniSONE 20 mg tablet; Take 2 tablets (40 mg total) by mouth daily for 5 days  -     XR spine lumbar minimum 4 views non injury; Future        Subjective:      Patient ID: Masoud Hogue is a 26 y o  male  CC:    Chief Complaint   Patient presents with    Back Pain     pt states pain is a little better  pt has tried heat and muscle relaxers   Sciatica     buttock pain that goes down to left leg  Pt states pain is getting worse and feels it more when walking  R Davonte       HPI:    Patient tried Robaxin and diclofenac twice a day with no improvement  Back Pain   This is a new problem  The current episode started in the past 7 days  The problem occurs constantly  The problem is unchanged  The pain is present in the gluteal and lumbar spine  The quality of the pain is described as burning and cramping  The pain radiates to the left thigh  The pain is at a severity of 6/10  The pain is moderate  The pain is worse during the day  The symptoms are aggravated by position and standing  Associated symptoms include leg pain  Pertinent negatives include no abdominal pain, bladder incontinence, bowel incontinence, chest pain, dysuria, fever, headaches, numbness, paresis, paresthesias, pelvic pain, tingling, weakness or weight loss   He has tried NSAIDs for the symptoms  The treatment provided no relief  The following portions of the patient's history were reviewed and updated as appropriate: allergies, current medications, past family history, past medical history, past social history, past surgical history and problem list       Review of Systems   Constitutional: Negative for activity change, appetite change, chills, diaphoresis, fatigue, fever and weight loss  HENT: Negative for congestion, postnasal drip, sinus pressure, sore throat and voice change  Eyes: Negative for pain, redness and visual disturbance  Respiratory: Negative for cough, chest tightness, shortness of breath and wheezing  Cardiovascular: Negative for chest pain, palpitations and leg swelling  Gastrointestinal: Negative for abdominal pain, bowel incontinence, constipation, diarrhea and nausea  Endocrine: Negative for cold intolerance, heat intolerance and polyuria  Genitourinary: Negative for bladder incontinence, dysuria, enuresis, flank pain, frequency and pelvic pain  Musculoskeletal: Positive for arthralgias, back pain, gait problem and myalgias  Negative for joint swelling and neck pain  Skin: Negative  Negative for color change and wound  Neurological: Negative for dizziness, tingling, syncope, speech difficulty, weakness, numbness, headaches and paresthesias  Psychiatric/Behavioral: Negative for behavioral problems and confusion  The patient is not nervous/anxious  Data to review:     Objective:    Vitals:    02/19/19 1029   BP: 122/78   BP Location: Left arm   Patient Position: Sitting   Cuff Size: Large   Pulse: 88   Resp: 16   Weight: (!) 147 kg (324 lb)   Height: 6' 3" (1 905 m)        Physical Exam   Constitutional: He is oriented to person, place, and time  He appears well-developed and well-nourished  HENT:   Head: Normocephalic and atraumatic  Eyes: Pupils are equal, round, and reactive to light   Conjunctivae and EOM are normal    Neck: Normal range of motion  Neck supple  Cardiovascular: Normal rate, regular rhythm, normal heart sounds and intact distal pulses  Pulmonary/Chest: Effort normal and breath sounds normal    Abdominal: Soft  Bowel sounds are normal    Musculoskeletal: Normal range of motion  He exhibits tenderness  He exhibits no edema or deformity  There is numbness and pain radiating over the left side up wound toe and heel walking   Neurological: He is alert and oriented to person, place, and time  He has normal reflexes  Skin: Skin is warm and dry  Psychiatric: He has a normal mood and affect  His behavior is normal    Nursing note and vitals reviewed

## 2019-03-11 ENCOUNTER — TELEPHONE (OUTPATIENT)
Dept: FAMILY MEDICINE CLINIC | Facility: CLINIC | Age: 34
End: 2019-03-11

## 2019-03-11 ENCOUNTER — DOCUMENTATION (OUTPATIENT)
Dept: FAMILY MEDICINE CLINIC | Facility: CLINIC | Age: 34
End: 2019-03-11

## 2019-03-11 NOTE — TELEPHONE ENCOUNTER
Patient was seen twice for back and leg pain in February he returned to work on the 25th  He was put on a steriod and that seemed to help after 3 days of being on it  The pain has returned from his hip down  He wants to know what is his next step?  Please call him at [phone]

## 2019-03-12 NOTE — TELEPHONE ENCOUNTER
Just because I saw a patient years ago with back pain does not me I would call something in  He did see Dr Ramsey Corey and Lionel Galindo recently for the back issue and I have not  Please check with them

## 2019-03-12 NOTE — TELEPHONE ENCOUNTER
Patient's wife called in and said that you had seen him for the back pain in the past and was wondering if you would prescribe something for him  She said that he has been in bed for a few days in pain and can't walk

## 2019-03-13 ENCOUNTER — APPOINTMENT (OUTPATIENT)
Dept: RADIOLOGY | Facility: MEDICAL CENTER | Age: 34
End: 2019-03-13
Payer: COMMERCIAL

## 2019-03-13 ENCOUNTER — OFFICE VISIT (OUTPATIENT)
Dept: FAMILY MEDICINE CLINIC | Facility: CLINIC | Age: 34
End: 2019-03-13

## 2019-03-13 VITALS
SYSTOLIC BLOOD PRESSURE: 120 MMHG | HEART RATE: 70 BPM | HEIGHT: 75 IN | BODY MASS INDEX: 39.17 KG/M2 | RESPIRATION RATE: 28 BRPM | DIASTOLIC BLOOD PRESSURE: 88 MMHG | WEIGHT: 315 LBS

## 2019-03-13 DIAGNOSIS — M54.30 SCIATIC LEG PAIN: ICD-10-CM

## 2019-03-13 DIAGNOSIS — M54.42 ACUTE MIDLINE LOW BACK PAIN WITH LEFT-SIDED SCIATICA: ICD-10-CM

## 2019-03-13 DIAGNOSIS — M54.42 ACUTE MIDLINE LOW BACK PAIN WITH LEFT-SIDED SCIATICA: Primary | ICD-10-CM

## 2019-03-13 PROCEDURE — 3008F BODY MASS INDEX DOCD: CPT | Performed by: PHYSICIAN ASSISTANT

## 2019-03-13 PROCEDURE — 99213 OFFICE O/P EST LOW 20 MIN: CPT | Performed by: PHYSICIAN ASSISTANT

## 2019-03-13 PROCEDURE — 72110 X-RAY EXAM L-2 SPINE 4/>VWS: CPT

## 2019-03-13 RX ORDER — GABAPENTIN 300 MG/1
300 CAPSULE ORAL
Qty: 30 CAPSULE | Refills: 0 | Status: SHIPPED | OUTPATIENT
Start: 2019-03-13 | End: 2019-04-05 | Stop reason: SDUPTHER

## 2019-03-13 NOTE — PATIENT INSTRUCTIONS
1  Left leg pain from sciatica-patient had x-rays of the lumbar spine which are not officially read by radiology yet  It does appear that there is some decreased disc space at the L5-S1 region  Would recommend further evaluation with MRI at this point  Also recommend starting physical therapy  He will be given gabapentin 300 mg to take before bedtime for the radiculopathy  He will keep appointment with Dr Shahab Connors on the 4th of April  Patient is interested in pursuing intermittent FMLA for miss time in protecting his job  He was advised to have the papers sent here  2  Mid low back pain-localized pain is somewhat better since using prednisone and anti-inflammatories

## 2019-03-13 NOTE — PROGRESS NOTES
Assessment/Plan:  Patient Instructions   1  Left leg pain from sciatica-patient had x-rays of the lumbar spine which are not officially read by radiology yet  It does appear that there is some decreased disc space at the L5-S1 region  Would recommend further evaluation with MRI at this point  Also recommend starting physical therapy  He will be given gabapentin 300 mg to take before bedtime for the radiculopathy  He will keep appointment with Dr Sapna Monroy on the 4th of April  Patient is interested in pursuing intermittent FMLA for miss time in protecting his job  He was advised to have the papers sent here  2  Mid low back pain-localized pain is somewhat better since using prednisone and anti-inflammatories  Diagnoses and all orders for this visit:    Acute midline low back pain with left-sided sciatica  -     gabapentin (NEURONTIN) 300 mg capsule; Take 1 capsule (300 mg total) by mouth daily at bedtime  -     Ambulatory referral to Physical Therapy; Future  -     MRI lumbar spine wo contrast; Future  -     Ambulatory referral to Orthopedic Surgery; Future    Sciatic leg pain  -     gabapentin (NEURONTIN) 300 mg capsule; Take 1 capsule (300 mg total) by mouth daily at bedtime  -     Ambulatory referral to Physical Therapy; Future  -     MRI lumbar spine wo contrast; Future  -     Ambulatory referral to Orthopedic Surgery; Future          Subjective:   Chief Complaint   Patient presents with    Back Pain     since 2/11/19    Leg Pain     left side since 2/11/19        Patient ID: Claudia Edwards is a 37 y o  male  HPI:  This is a 58-year-old gentleman that presents to the office for follow-up of ongoing back pain  Symptoms have been present for slightly over 6 weeks now  He was initially seen and placed on anti-inflammatories and muscle relaxers and after symptoms persisted he was then placed on prednisone    He states that he is still having some significant pain down his left leg though his localized back pain has improved  Symptoms bother him when he is sitting for a prolonged period of time, typically usually greater than 15 minutes  This has had a significant impact on his life is he has not been able to go to the classes recently  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  He has also had many absences from work because of the pain  He does not recall any specific injury to the low back  He has scheduled an appointment with an orthopedic specialist on the 4th of April  In the meantime he is wondering what else he can do  The following portions of the patient's history were reviewed and updated as appropriate: allergies, current medications, past family history, past medical history, past social history, past surgical history and problem list     Review of Systems   Constitutional: Negative for fever  HENT: Negative for congestion  Respiratory: Negative for cough, chest tightness and shortness of breath  Cardiovascular: Negative for chest pain  Musculoskeletal: Positive for arthralgias, back pain and myalgias  Objective:      /88 (BP Location: Left arm, Patient Position: Sitting, Cuff Size: Standard)   Pulse 70   Resp (!) 28   Ht 6' 3" (1 905 m)   Wt (!) 146 kg (322 lb)   BMI 40 25 kg/m²          Physical Exam   Constitutional: He is oriented to person, place, and time  He appears well-developed and well-nourished  HENT:   Head: Normocephalic  Cardiovascular: Normal rate and regular rhythm  Pulmonary/Chest: Effort normal and breath sounds normal  No respiratory distress  Abdominal: Soft  Musculoskeletal: Normal range of motion  Patient has pain with extension of the lumbar spine  There is no point tenderness to palpation  Positive straight leg raise  Neurological: He is alert and oriented to person, place, and time  Psychiatric: He has a normal mood and affect  Nursing note and vitals reviewed

## 2019-03-15 ENCOUNTER — TELEPHONE (OUTPATIENT)
Dept: FAMILY MEDICINE CLINIC | Facility: CLINIC | Age: 34
End: 2019-03-15

## 2019-03-15 DIAGNOSIS — M54.30 SCIATIC LEG PAIN: Primary | ICD-10-CM

## 2019-03-15 RX ORDER — TRAMADOL HYDROCHLORIDE 50 MG/1
50 TABLET ORAL EVERY 6 HOURS PRN
Qty: 20 TABLET | Refills: 0 | Status: SHIPPED | OUTPATIENT
Start: 2019-03-15 | End: 2020-01-13 | Stop reason: SDUPTHER

## 2019-03-15 NOTE — TELEPHONE ENCOUNTER
WAS PRESCRIBED GAPENTIN BY ILAN DEL VALLE, HE IS EXPERIENCING UNEXPECTED MUSCLE PAIN FROM THIS - MINIMUM RANGE OF MOTION WITH HIS R SHOULD AND ELBOW IS SWOLLEN AND LIMITED RANGE OF MOTION  PLEASE CALL BACK ASAP  THANK YOU  WHAT ARE ILAN'S RECOMMENDATIONS?

## 2019-03-15 NOTE — TELEPHONE ENCOUNTER
Recommend he discontinue gabapentin  Swelling in localized elbow and shoulder because of the medication is unlikely however  If symptoms persist I would recommend he be evaluated in the urgent care setting this weekend  I will send in a prescription for tramadol for him to try for the pain in the meantime

## 2019-03-16 ENCOUNTER — HOSPITAL ENCOUNTER (EMERGENCY)
Facility: HOSPITAL | Age: 34
Discharge: HOME | End: 2019-03-16
Attending: EMERGENCY MEDICINE | Admitting: EMERGENCY MEDICINE
Payer: COMMERCIAL

## 2019-03-16 VITALS
SYSTOLIC BLOOD PRESSURE: 123 MMHG | HEART RATE: 89 BPM | RESPIRATION RATE: 18 BRPM | TEMPERATURE: 98.7 F | WEIGHT: 315 LBS | DIASTOLIC BLOOD PRESSURE: 67 MMHG | OXYGEN SATURATION: 99 % | BODY MASS INDEX: 40.23 KG/M2

## 2019-03-16 DIAGNOSIS — M79.89 ARM SWELLING: ICD-10-CM

## 2019-03-16 DIAGNOSIS — M25.421 ELBOW SWELLING, RIGHT: ICD-10-CM

## 2019-03-16 DIAGNOSIS — M79.601 RIGHT ARM PAIN: Primary | ICD-10-CM

## 2019-03-16 PROCEDURE — 99283 EMERGENCY DEPT VISIT LOW MDM: CPT

## 2019-03-16 RX ORDER — COLCHICINE 0.6 MG/1
1.2 TABLET ORAL ONCE
Status: COMPLETED | OUTPATIENT
Start: 2019-03-16 | End: 2019-03-16

## 2019-03-16 RX ORDER — MELOXICAM 15 MG/1
15 TABLET ORAL DAILY
Qty: 7 TABLET | Refills: 0 | Status: SHIPPED | OUTPATIENT
Start: 2019-03-16 | End: 2019-08-15 | Stop reason: ALTCHOICE

## 2019-03-16 RX ORDER — COLCHICINE 0.6 MG/1
0.6 TABLET ORAL ONCE
Qty: 1 TABLET | Refills: 0 | Status: SHIPPED | OUTPATIENT
Start: 2019-03-16 | End: 2020-01-22 | Stop reason: ALTCHOICE

## 2019-03-16 RX ORDER — LIDOCAINE HYDROCHLORIDE 10 MG/ML
10 INJECTION, SOLUTION EPIDURAL; INFILTRATION; INTRACAUDAL; PERINEURAL ONCE
Status: COMPLETED | OUTPATIENT
Start: 2019-03-16 | End: 2019-03-16

## 2019-03-16 RX ORDER — IBUPROFEN 600 MG/1
600 TABLET ORAL ONCE
Status: COMPLETED | OUTPATIENT
Start: 2019-03-16 | End: 2019-03-16

## 2019-03-16 RX ADMIN — LIDOCAINE HYDROCHLORIDE 10 ML: 10 INJECTION, SOLUTION EPIDURAL; INFILTRATION; INTRACAUDAL; PERINEURAL at 17:20

## 2019-03-16 RX ADMIN — IBUPROFEN 600 MG: 600 TABLET ORAL at 18:25

## 2019-03-16 RX ADMIN — COLCHICINE 1.2 MG: 0.6 TABLET, FILM COATED ORAL at 18:48

## 2019-03-16 NOTE — ED PROVIDER NOTES
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      Patient has sciatica recently started gabapentin on Thursday; Thursday night states he always has some swelling behind his olecranon on the right and the swelling increased  Became slightly red; but not warm  No fevers chills  No tick bites or exposures  No trauma to the area  No fevers chills chest pain shortness of breath; abdominal pain  States very tense and difficult to move right elbow  States is very swollen he stopped his gabapentin and doctor changed to tramadol  Right now he has no swelling on the volar surfaces only the dorsal surface by the olecranon  Ultrasound at bedside shows some nonspecific tissue changes; he has mild tenderness to palpation; is a dull erythema; but does not appear infectious  Patient has a small joint effusion    States he may have had some swelling of some foot joints in the past   No firm diagnosis of gout or pseudogout  No systemic symptoms  Prior to Admission Medications   Prescriptions Last Dose Informant Patient Reported? Taking?   diclofenac (VOLTAREN) 75 mg EC tablet   No Yes   Sig: Take 1 tablet (75 mg total) by mouth 2 (two) times a day for 15 days   gabapentin (NEURONTIN) 300 mg capsule   No No   Sig: Take 1 capsule (300 mg total) by mouth daily at bedtime   methocarbamol (ROBAXIN) 750 mg tablet   No No   Sig: Take 1 tablet (750 mg total) by mouth every 6 (six) hours as needed for muscle spasms for up to 14 days   traMADol (ULTRAM) 50 mg tablet   No Yes   Sig: Take 1 tablet (50 mg total) by mouth every 6 (six) hours as needed for moderate pain      Facility-Administered Medications: None       Past Medical History:   Diagnosis Date    Arthritis        History reviewed  No pertinent surgical history  History reviewed  No pertinent family history    I have reviewed and agree with the history as documented  Social History     Tobacco Use    Smoking status: Never Smoker    Smokeless tobacco: Never Used   Substance Use Topics    Alcohol use: No    Drug use: Never        Review of Systems   Constitutional: Negative for activity change, appetite change, chills and fever  HENT: Negative for congestion, rhinorrhea and sore throat  Eyes: Negative for photophobia and pain  Respiratory: Negative for cough, chest tightness and wheezing  Cardiovascular: Negative for chest pain, palpitations and leg swelling  Gastrointestinal: Negative for abdominal distention, abdominal pain, constipation, diarrhea and nausea  Genitourinary: Negative for dysuria, flank pain, frequency and hematuria  Musculoskeletal: Positive for arthralgias and myalgias  Negative for back pain, neck pain and neck stiffness  Right elbow some mild right shoulder discomfort states he feels like coming from discomfort in the elbow   Skin: Negative for color change and rash  Neurological: Negative for seizures, speech difficulty, weakness, light-headedness and headaches  Hematological: Negative for adenopathy  Psychiatric/Behavioral: Negative for agitation, confusion, hallucinations and suicidal ideas         Physical Exam  ED Triage Vitals [03/16/19 1606]   Temperature Pulse Respirations Blood Pressure SpO2   98 7 °F (37 1 °C) 90 18 162/88 98 %      Temp Source Heart Rate Source Patient Position - Orthostatic VS BP Location FiO2 (%)   Oral Monitor Sitting Left arm --      Pain Score       3           qSOFA     Row Name 03/16/19 1815 03/16/19 1652 03/16/19 1606          Altered mental status GCS < 15  --  0  --      Respiratory Rate > / =22  0  --  0      Systolic BP < / =577  0  --  0      Q Sofa Score  0  0  0            Orthostatic Vital Signs  Vitals:    03/16/19 1606 03/16/19 1815   BP: 162/88 123/67   Pulse: 90 89   Patient Position - Orthostatic VS: Sitting Sitting       Physical Exam   Constitutional: He is oriented to person, place, and time  He appears well-developed and well-nourished  No distress  HENT:   Head: Normocephalic and atraumatic  Mouth/Throat: No oropharyngeal exudate  Eyes: Pupils are equal, round, and reactive to light  EOM are normal  Right eye exhibits no discharge  Left eye exhibits no discharge  Neck: Normal range of motion  Neck supple  No JVD present  No tracheal deviation present  Cardiovascular: Normal rate and normal heart sounds  No murmur heard  Pulmonary/Chest: Effort normal and breath sounds normal  No respiratory distress  He has no wheezes  He has no rales  Abdominal: Soft  Bowel sounds are normal  He exhibits no distension  There is no tenderness  There is no rebound and no guarding  Soft no tenderness   Musculoskeletal: Normal range of motion  He exhibits edema  He exhibits no deformity  Tenderness right elbow but no obvious movable fluid collection  Even with ultrasound there is small amount of intra-articular fluid  No obvious evidence of olecranon bursitis  No volar swelling pain or redness  Can supinate and pronate with mild discomfort   Lymphadenopathy:     He has no cervical adenopathy  Neurological: He is alert and oriented to person, place, and time  No cranial nerve deficit  He exhibits normal muscle tone  Skin: Skin is warm and dry  There is erythema  Dull erythema at elbow is not warm   Psychiatric: He has a normal mood and affect   His behavior is normal  Thought content normal        ED Medications  Medications   lidocaine (PF) (XYLOCAINE-MPF) 1 % injection 10 mL (10 mL Infiltration Given by Other 3/16/19 1720)   colchicine (COLCRYS) tablet 1 2 mg (1 2 mg Oral Given 3/16/19 1848)   ibuprofen (MOTRIN) tablet 600 mg (600 mg Oral Given 3/16/19 1825)       Diagnostic Studies  Results Reviewed     None                 No orders to display         Procedures  Procedures      Phone Consults  ED Phone Contact    ED Course  ED Course as of Mar 17 0765   Sat Mar 16, 2019   1809 Attempted aspiration without success  Will treat empirically for gout/pseudogout  Sports Medicine follow-up                                  MDM  Number of Diagnoses or Management Options  Arm swelling:   Elbow swelling, right:   Right arm pain:   Diagnosis management comments: Right arm erythema and swelling  Does not appear to be cellulitic  No freely palpable olecranon bursa  Bedside Ultrasound shows very small area of joint swelling  Attempted aspiration without success  Does no possible areas of swelling around the foot before  There is a possibility of gout versus pseudogout  Will treat empirically  Can move the joint there is no swelling on the volar surface  Is afebrile without systemic symptoms do not suspect septic joint  Discussed return precautions/Sports Medicine follow-up  Disposition  Final diagnoses:   Right arm pain   Arm swelling   Elbow swelling, right     Time reflects when diagnosis was documented in both MDM as applicable and the Disposition within this note     Time User Action Codes Description Comment    3/16/2019  6:38 PM Susana [place] Add [M79 601] Right arm pain     3/16/2019  6:38 PM Susana [place] Add [M79 89] Arm swelling     3/16/2019  6:38 PM Susana [place] Add [M25 421] Elbow swelling, right       ED Disposition     ED Disposition Condition Date/Time Comment    Discharge Good Sat Mar 16, 2019  6:38 PM Alfredo Ruiz discharge to home/self care              Follow-up Information     Follow up With Specialties Details Why Contact Info    Lyubov Hess PA-C Family Medicine, Physician Assistant Schedule an appointment as soon as possible for a visit in 1 week Make follow-up appointment [address] [place] [address] [id number] [phone]      [address]  Schedule an appointment as soon as possible for a visit  elbow swelling [place]  [phone]          Discharge Medication List as of 3/16/2019  6:41 PM      START taking these medications    Details   colchicine (COLCRYS) 0 6 mg tablet Take 1 tablet (0 6 mg total) by mouth once for 1 dose, Starting Sat 3/16/2019, Print      meloxicam (MOBIC) 15 mg tablet Take 1 tablet (15 mg total) by mouth daily for 7 days, Starting Sat 3/16/2019, Until Sat 3/23/2019, Print         CONTINUE these medications which have NOT CHANGED    Details   diclofenac (VOLTAREN) 75 mg EC tablet Take 1 tablet (75 mg total) by mouth 2 (two) times a day for 15 days, Starting Fri 1/4/2019, Until Sat 3/16/2019, Normal      traMADol (ULTRAM) 50 mg tablet Take 1 tablet (50 mg total) by mouth every 6 (six) hours as needed for moderate pain, Starting Fri 3/15/2019, Normal      gabapentin (NEURONTIN) 300 mg capsule Take 1 capsule (300 mg total) by mouth daily at bedtime, Starting Wed 3/13/2019, Normal      methocarbamol (ROBAXIN) 750 mg tablet Take 1 tablet (750 mg total) by mouth every 6 (six) hours as needed for muscle spasms for up to 14 days, Starting Wed 2/13/2019, Until Wed 2/27/2019, Normal           No discharge procedures on file  ED Provider  Attending physically available and evaluated Sam Oh I managed the patient along with the ED Attending      Electronically Signed by         Edouard Huber DO  03/17/19 0139

## 2019-03-16 NOTE — ED ATTENDING ATTESTATION
Kush Stanley DO, saw and evaluated the patient  I have discussed the patient with the resident/non-physician practitioner and agree with the resident's/non-physician practitioner's findings, Plan of Care, and MDM as documented in the resident's/non-physician practitioner's note, except where noted  All available labs and Radiology studies were reviewed  I was present for key portions of any procedure(s) performed by the resident/non-physician practitioner and I was immediately available to provide assistance  At this point I agree with the current assessment done in the Emergency Department  I have conducted an independent evaluation of this patient a history and physical is as follows:    38 yo male presents for evaluation of R elbow swelling, mostly over the lateral aspect  Occurred today, rated 3/10  Decreased ROM of elbow due to swelling  Denies radiation of pain  Pain worse with elbow flexion, supination  Position of comfort seems to be partially flexed, partially pronated  There is swelling over the lateral epicondyle into the olecranon but the olecranon bursa is not palpable  Bedside ultrasound reveals no bursitis  Edema of soft tissue over olecranon  No cobblestoning  Minimal elbow effusion noted  Discussed w/pt that this is unlikely to be infectious, but unable to confirm without arthrocentesis  May also be crystal arthropathy, lateral epicondylitis  Pt agreeable to arthrocentesis attempt to further characterize source of symptoms  Will attempt arthrocentesis  Likely d/c home NSAIDs and ACE wrap  F/u sports med        Critical Care Time  Procedures

## 2019-03-28 ENCOUNTER — TELEPHONE (OUTPATIENT)
Dept: FAMILY MEDICINE CLINIC | Facility: CLINIC | Age: 34
End: 2019-03-28

## 2019-03-31 DIAGNOSIS — G89.29 CHRONIC WRIST PAIN, UNSPECIFIED LATERALITY: ICD-10-CM

## 2019-03-31 DIAGNOSIS — M25.539 CHRONIC WRIST PAIN, UNSPECIFIED LATERALITY: ICD-10-CM

## 2019-04-01 RX ORDER — DICLOFENAC SODIUM 75 MG/1
75 TABLET, DELAYED RELEASE ORAL 2 TIMES DAILY
Qty: 180 TABLET | Refills: 0 | Status: SHIPPED | OUTPATIENT
Start: 2019-04-01 | End: 2019-07-15 | Stop reason: SDUPTHER

## 2019-04-02 ENCOUNTER — TELEPHONE (OUTPATIENT)
Dept: OBGYN CLINIC | Facility: HOSPITAL | Age: 34
End: 2019-04-02

## 2019-04-05 DIAGNOSIS — M54.30 SCIATIC LEG PAIN: ICD-10-CM

## 2019-04-05 DIAGNOSIS — M54.42 ACUTE MIDLINE LOW BACK PAIN WITH LEFT-SIDED SCIATICA: ICD-10-CM

## 2019-04-05 RX ORDER — GABAPENTIN 300 MG/1
300 CAPSULE ORAL
Qty: 30 CAPSULE | Refills: 0 | Status: SHIPPED | OUTPATIENT
Start: 2019-04-05 | End: 2019-04-22 | Stop reason: SDUPTHER

## 2019-04-22 DIAGNOSIS — M54.42 ACUTE MIDLINE LOW BACK PAIN WITH LEFT-SIDED SCIATICA: ICD-10-CM

## 2019-04-22 DIAGNOSIS — M54.30 SCIATIC LEG PAIN: ICD-10-CM

## 2019-04-23 RX ORDER — GABAPENTIN 300 MG/1
300 CAPSULE ORAL
Qty: 90 CAPSULE | Refills: 0 | Status: SHIPPED | OUTPATIENT
Start: 2019-04-23 | End: 2019-08-15 | Stop reason: SINTOL

## 2019-07-15 DIAGNOSIS — M25.539 CHRONIC WRIST PAIN, UNSPECIFIED LATERALITY: ICD-10-CM

## 2019-07-15 DIAGNOSIS — G89.29 CHRONIC WRIST PAIN, UNSPECIFIED LATERALITY: ICD-10-CM

## 2019-07-15 RX ORDER — DICLOFENAC SODIUM 75 MG/1
75 TABLET, DELAYED RELEASE ORAL 2 TIMES DAILY
Qty: 60 TABLET | Refills: 0 | Status: SHIPPED | OUTPATIENT
Start: 2019-07-15 | End: 2019-08-07 | Stop reason: SDUPTHER

## 2019-08-07 DIAGNOSIS — G89.29 CHRONIC WRIST PAIN, UNSPECIFIED LATERALITY: ICD-10-CM

## 2019-08-07 DIAGNOSIS — M25.539 CHRONIC WRIST PAIN, UNSPECIFIED LATERALITY: ICD-10-CM

## 2019-08-07 RX ORDER — DICLOFENAC SODIUM 75 MG/1
75 TABLET, DELAYED RELEASE ORAL 2 TIMES DAILY
Qty: 60 TABLET | Refills: 0 | Status: SHIPPED | OUTPATIENT
Start: 2019-08-07 | End: 2019-09-07 | Stop reason: SDUPTHER

## 2019-08-15 ENCOUNTER — TELEPHONE (OUTPATIENT)
Dept: FAMILY MEDICINE CLINIC | Facility: CLINIC | Age: 34
End: 2019-08-15

## 2019-08-15 ENCOUNTER — OFFICE VISIT (OUTPATIENT)
Dept: FAMILY MEDICINE CLINIC | Facility: CLINIC | Age: 34
End: 2019-08-15
Payer: COMMERCIAL

## 2019-08-15 ENCOUNTER — HOSPITAL ENCOUNTER (EMERGENCY)
Facility: HOSPITAL | Age: 34
Discharge: HOME | End: 2019-08-15
Attending: EMERGENCY MEDICINE | Admitting: EMERGENCY MEDICINE
Payer: COMMERCIAL

## 2019-08-15 VITALS
HEIGHT: 75 IN | SYSTOLIC BLOOD PRESSURE: 130 MMHG | DIASTOLIC BLOOD PRESSURE: 68 MMHG | WEIGHT: 315 LBS | BODY MASS INDEX: 39.17 KG/M2 | HEART RATE: 74 BPM

## 2019-08-15 VITALS
RESPIRATION RATE: 18 BRPM | OXYGEN SATURATION: 98 % | TEMPERATURE: 98.2 F | WEIGHT: 315 LBS | DIASTOLIC BLOOD PRESSURE: 113 MMHG | HEART RATE: 83 BPM | SYSTOLIC BLOOD PRESSURE: 157 MMHG | BODY MASS INDEX: 39.96 KG/M2

## 2019-08-15 DIAGNOSIS — G89.29 CHRONIC BACK PAIN: Primary | ICD-10-CM

## 2019-08-15 DIAGNOSIS — E66.01 MORBID OBESITY WITH BMI OF 40.0-44.9, ADULT (HCC): ICD-10-CM

## 2019-08-15 DIAGNOSIS — M54.30 SCIATIC LEG PAIN: Primary | ICD-10-CM

## 2019-08-15 DIAGNOSIS — S39.012A STRAIN OF BACK, INITIAL ENCOUNTER: ICD-10-CM

## 2019-08-15 DIAGNOSIS — M54.42 ACUTE MIDLINE LOW BACK PAIN WITH LEFT-SIDED SCIATICA: ICD-10-CM

## 2019-08-15 DIAGNOSIS — M54.9 CHRONIC BACK PAIN: Primary | ICD-10-CM

## 2019-08-15 PROCEDURE — 99214 OFFICE O/P EST MOD 30 MIN: CPT | Performed by: FAMILY MEDICINE

## 2019-08-15 PROCEDURE — 99285 EMERGENCY DEPT VISIT HI MDM: CPT | Performed by: EMERGENCY MEDICINE

## 2019-08-15 PROCEDURE — 99283 EMERGENCY DEPT VISIT LOW MDM: CPT

## 2019-08-15 PROCEDURE — 96372 THER/PROPH/DIAG INJ SC/IM: CPT | Performed by: FAMILY MEDICINE

## 2019-08-15 PROCEDURE — 96372 THER/PROPH/DIAG INJ SC/IM: CPT

## 2019-08-15 RX ORDER — HYDROMORPHONE HCL 110MG/55ML
2 PATIENT CONTROLLED ANALGESIA SYRINGE INTRAVENOUS ONCE
Status: COMPLETED | OUTPATIENT
Start: 2019-08-15 | End: 2019-08-15

## 2019-08-15 RX ORDER — PREDNISONE 10 MG/1
TABLET ORAL
Qty: 36 TABLET | Refills: 0 | Status: SHIPPED | OUTPATIENT
Start: 2019-08-15 | End: 2019-08-28 | Stop reason: SDUPTHER

## 2019-08-15 RX ORDER — METHOCARBAMOL 750 MG/1
750 TABLET, FILM COATED ORAL EVERY 6 HOURS PRN
Qty: 30 TABLET | Refills: 1 | Status: SHIPPED | OUTPATIENT
Start: 2019-08-15 | End: 2020-01-13 | Stop reason: SDUPTHER

## 2019-08-15 RX ORDER — KETOROLAC TROMETHAMINE 30 MG/ML
30 INJECTION, SOLUTION INTRAMUSCULAR; INTRAVENOUS ONCE
Status: COMPLETED | OUTPATIENT
Start: 2019-08-15 | End: 2019-08-15

## 2019-08-15 RX ADMIN — KETOROLAC TROMETHAMINE 30 MG: 30 INJECTION, SOLUTION INTRAMUSCULAR; INTRAVENOUS at 12:14

## 2019-08-15 RX ADMIN — HYDROMORPHONE HYDROCHLORIDE 2 MG: 2 INJECTION INTRAMUSCULAR; INTRAVENOUS; SUBCUTANEOUS at 20:59

## 2019-08-15 NOTE — PROGRESS NOTES
Assessment and Plan:  Follow-up with Orthopedics as soon as possible    Problem List Items Addressed This Visit        Nervous and Auditory    Sciatic leg pain - Primary     He was placed on prednisone 10 mg tablets:  Five tablets for 2 days, 4 tablets for 3 days, 3 tablets for 3 days, 2 tablets for 2 days and 1 tablet for 1 day  He will continue taking the methocarbamol 750 mg 1 every 6 hours as needed  He will get an injection of Toradol 60 milligrams/milliliter stat today         Relevant Medications    predniSONE 10 mg tablet    methocarbamol (ROBAXIN) 750 mg tablet    Other Relevant Orders    Ambulatory referral to Orthopedic Surgery       Musculoskeletal and Integument    Strain of back, initial encounter    Relevant Medications    methocarbamol (ROBAXIN) 750 mg tablet       Other    Acute midline low back pain with left-sided sciatica     The patient already had x-rays and will be set up to see Orthopedics as soon as possible  Relevant Medications    predniSONE 10 mg tablet    Other Relevant Orders    Ambulatory referral to Orthopedic Surgery      Other Visit Diagnoses     Morbid obesity with BMI of 40 0-44 9, adult ([place] [id number] )                     Diagnoses and all orders for this visit:    Sciatic leg pain  -     Ambulatory referral to Orthopedic Surgery; Future  -     predniSONE 10 mg tablet; Five tablets for 2 days, 4 tablets for 3 days, 3 tablets for 3 days, 2 tablets for 2 days, 1 tablet for 1 day  -     methocarbamol (ROBAXIN) 750 mg tablet; Take 1 tablet (750 mg total) by mouth every 6 (six) hours as needed for muscle spasms for up to 14 days    Acute midline low back pain with left-sided sciatica  -     Ambulatory referral to Orthopedic Surgery; Future  -     predniSONE 10 mg tablet; Five tablets for 2 days, 4 tablets for 3 days, 3 tablets for 3 days, 2 tablets for 2 days, 1 tablet for 1 day      Morbid obesity with BMI of 40 0-44 9, adult ([facility] )    Strain of back, initial encounter  - methocarbamol (ROBAXIN) 750 mg tablet; Take 1 tablet (750 mg total) by mouth every 6 (six) hours as needed for muscle spasms for up to 14 days              Subjective:      Patient ID: Mónica Rowland is a 37 y o  male  CC:    Chief Complaint   Patient presents with    Back Pain     left sided low back pain that radiates into left leg  Symptoms have been ongoing since June, but have become much worse in the past week  -  [facility]    Leg Pain       HPI:    This is a 57-year-old male who comes in with left-sided hip and lateral leg pain which 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  Back in February he had the same problem with sciatica but the pain resolved and returned after the long driving session  He had been having the pain since June but it got worse in the past week or two  He has trouble sitting comfortably and has to lay down  He is requesting a note to stay out of work for the next 3 days  His blood pressure is 130/68 and his weight is down 1 lb from the previous visit to 320 lb  His BMI is 40 0  He had been on tramadol and methocarbamol and will be placed on a tapering dose of prednisone and continue the methocarbamol  The following portions of the patient's history were reviewed and updated as appropriate: allergies, current medications, past family history, past medical history, past social history, past surgical history and problem list       Review of Systems   Constitutional: Negative  HENT: Negative  Eyes: Negative  Respiratory: Negative  Cardiovascular: Negative  Gastrointestinal: Negative  Endocrine: Negative  Genitourinary: Negative  Musculoskeletal: Negative  Left hip pain and left lateral lower leg pain  Skin: Negative  Allergic/Immunologic: Negative  Neurological: Negative  Hematological: Negative  Psychiatric/Behavioral: Negative            Data to review:       Objective:    Vitals:    08/15/19 1029   BP: 130/68   BP Location: Left arm   Patient Position: Lying right side   Cuff Size: Large   Pulse: 74   Weight: (!) 145 kg (320 lb)   Height: 6' 3" (1 905 m)        Physical Exam   Constitutional: He is oriented to person, place, and time  He appears well-developed and well-nourished  HENT:   Head: Normocephalic  Right Ear: External ear normal    Left Ear: External ear normal    Mouth/Throat: Oropharynx is clear and moist    Eyes: Pupils are equal, round, and reactive to light  Conjunctivae and EOM are normal    Neck: Normal range of motion  Neck supple  Cardiovascular: Normal rate, regular rhythm and normal heart sounds  Pulmonary/Chest: Effort normal and breath sounds normal    Abdominal: Soft  Bowel sounds are normal    Musculoskeletal: He exhibits tenderness  He exhibits no edema or deformity  Tender to palpation left hip with limited range of motion of left leg  He is unable to do any straight leg testing  Neurological: He is alert and oriented to person, place, and time  Skin: Skin is warm  Psychiatric: He has a normal mood and affect  His behavior is normal  Judgment and thought content normal    Nursing note and vitals reviewed  BMI Counseling: Body mass index is 40 kg/m²  Discussed the patient's BMI with him  The BMI is above average  BMI counseling and education was provided to the patient  Nutrition recommendations include reducing portion sizes, decreasing overall calorie intake, 3-5 servings of fruits/vegetables daily, reducing fast food intake, consuming healthier snacks, decreasing soda and/or juice intake, moderation in carbohydrate intake, increasing intake of lean protein and reducing intake of cholesterol  Exercise recommendations include moderate aerobic physical activity for 150 minutes/week

## 2019-08-15 NOTE — TELEPHONE ENCOUNTER
Called pt back, explained to pt per TS that if he is in that much pain then he needs to go to emergency room   Pt aware

## 2019-08-15 NOTE — TELEPHONE ENCOUNTER
Patient called stating the "pain injection he got this morning wore off and would like Tramadol called in"  Please call patient to advise

## 2019-08-15 NOTE — ASSESSMENT & PLAN NOTE
He was placed on prednisone 10 mg tablets:  Five tablets for 2 days, 4 tablets for 3 days, 3 tablets for 3 days, 2 tablets for 2 days and 1 tablet for 1 day  He will continue taking the methocarbamol 750 mg 1 every 6 hours as needed    He will get an injection of Toradol 60 milligrams/milliliter stat today

## 2019-08-15 NOTE — ED PROVIDER NOTES
History  Chief Complaint   Patient presents with    Back Pain     pt reports left sided back pain radiating into left knee, had simialr episode in the past, seen by FMD today given prednisone and robaxin at 1730, and injection in the office, pt very uncomfortable in triage and diaphoretic     Patient is a 40-year-old male presenting for left lower back pain with radiation down to his left knee  Patient was seen earlier today by PCP who prescribed a prednisone taper and gave IM injection of 60 mg of Toradol  Patient previously had a similar pain earlier this year which she attributed to sitting for extended period of time while driving  At that time patient was given prescription for tramadol which seemed to relieve his pain and he did not follow up with [facility]; patient states he was scheduled for an MRI however his insurance not approve it  Patient states that this exacerbation of his pain started about 2 days ago however it increased in severity since onset  Patient received relief of his pain with the Toradol however once that wore off the pain returned  Patient states he is unable to get a good night's sleep over the past 2 nights due to pain  Patient notes that walking seems to help his pain for short period of time and then pain increases  Patient denies any other trauma or injury to his back or legs  He denies any fevers or chills, urinary retention or incontinence, bowel incontinence, IV drug use, cancer history, night sweats, weight loss  Prior to Admission Medications   Prescriptions Last Dose Informant Patient Reported?  Taking?   colchicine (COLCRYS) 0 6 mg tablet   No No   Sig: Take 1 tablet (0 6 mg total) by mouth once for 1 dose   diclofenac (VOLTAREN) 75 mg EC tablet   No No   Sig: TAKE 1 TABLET (75 MG TOTAL) BY MOUTH 2 (TWO) TIMES A DAY FOR 30 DAYS   methocarbamol (ROBAXIN) 750 mg tablet   No No   Sig: Take 1 tablet (750 mg total) by mouth every 6 (six) hours as needed for muscle spasms for up to 14 days   predniSONE 10 mg tablet   No No   Sig: Five tablets for 2 days, 4 tablets for 3 days, 3 tablets for 3 days, 2 tablets for 2 days, 1 tablet for 1 day  traMADol (ULTRAM) 50 mg tablet   No No   Sig: Take 1 tablet (50 mg total) by mouth every 6 (six) hours as needed for moderate pain      Facility-Administered Medications Last Administration Doses Remaining   ketorolac (TORADOL) 60 mg/2 mL IM injection 30 mg 8/15/2019 12:14 PM 0          Past Medical History:   Diagnosis Date    Arthritis        History reviewed  No pertinent surgical history  History reviewed  No pertinent family history  I have reviewed and agree with the history as documented  Social History     Tobacco Use    Smoking status: Never Smoker    Smokeless tobacco: Never Used   Substance Use Topics    Alcohol use: No    Drug use: Never        Review of Systems   Constitutional: Negative for chills and fever  Respiratory: Negative for shortness of breath  Cardiovascular: Negative for chest pain  Gastrointestinal: Negative for abdominal pain, constipation, nausea and vomiting  Genitourinary: Negative for dysuria  Musculoskeletal: Positive for arthralgias and myalgias  Negative for back pain, gait problem and neck pain  Skin: Negative for color change, rash and wound  Neurological: Negative for weakness, light-headedness, numbness and headaches  All other systems reviewed and are negative        Physical Exam  ED Triage Vitals [08/15/19 1849]   Temperature Pulse Respirations Blood Pressure SpO2   98 2 °F (36 8 °C) 83 18 (!) 157/113 98 %      Temp Source Heart Rate Source Patient Position - Orthostatic VS BP Location FiO2 (%)   Oral Monitor Standing Right arm --      Pain Score       9             Orthostatic Vital Signs  Vitals:    08/15/19 1849   BP: (!) 157/113   Pulse: 83   Patient Position - Orthostatic VS: Standing       Physical Exam   Constitutional: He appears well-developed and well-nourished  Laying on R side in bed   HENT:   Head: Normocephalic and atraumatic  Eyes: Conjunctivae are normal  Right eye exhibits no discharge  Left eye exhibits no discharge  Cardiovascular: Normal rate, regular rhythm, normal heart sounds and intact distal pulses  No murmur heard  Pulmonary/Chest: Effort normal and breath sounds normal  No stridor  No respiratory distress  He has no wheezes  He has no rales  Abdominal: Soft  He exhibits no distension  There is no tenderness  There is no rebound and no guarding  Musculoskeletal: He exhibits no edema, tenderness or deformity  Strength equal in bilateral LE, no midline C/T/L pain, no signs of trauma   Neurological: He is alert  No sensory deficit  He exhibits normal muscle tone  Skin: Skin is warm  No rash noted  He is not diaphoretic  Vitals reviewed  ED Medications  Medications   HYDROmorphone (DILAUDID) injection 2 mg (2 mg Intramuscular Given 8/15/19 2059)       Diagnostic Studies  Results Reviewed     None                 No orders to display         Procedures  Procedures        ED Course                               MDM  Number of Diagnoses or Management Options  Chronic back pain:   Diagnosis management comments: Patient with acute exacerbation of chronic low back pain  Patient has no concerning red flags of back pain including fevers or chills, IV DA, urinary retention or incontinence, history of cancer, weight loss, night sweats, midline tenderness    Patient given IM injection of Dilaudid and advised to follow up with comprehensive spine program       Disposition  Final diagnoses:   Chronic back pain     Time reflects when diagnosis was documented in both MDM as applicable and the Disposition within this note     Time User Action Codes Description Comment    8/15/2019  8:56 PM Rg Mcmullen [M54 9,  G89 29] Chronic back pain       ED Disposition     ED Disposition Condition Date/Time Comment Discharge Stable Thu Aug 15, 2019  8:56 PM Sarah Schaeffer discharge to home/self care  Follow-up Information     Follow up With Specialties Details Why Contact Info Additional Information    Sridevi Castro PA-C Family Medicine, Physician Assistant   [address]  [phone]       [facility] [address] Emergency Department Emergency Medicine  If symptoms worsen [address]  [phone]  ED, [address], [address]          Discharge Medication List as of 8/15/2019  8:57 PM      CONTINUE these medications which have NOT CHANGED    Details   colchicine (COLCRYS) 0 6 mg tablet Take 1 tablet (0 6 mg total) by mouth once for 1 dose, Starting Sat 3/16/2019, Print      diclofenac (VOLTAREN) 75 mg EC tablet TAKE 1 TABLET (75 MG TOTAL) BY MOUTH 2 (TWO) TIMES A DAY FOR 30 DAYS, Starting Wed 8/7/2019, Until Fri 9/6/2019, Normal      methocarbamol (ROBAXIN) 750 mg tablet Take 1 tablet (750 mg total) by mouth every 6 (six) hours as needed for muscle spasms for up to 14 days, Starting Thu 8/15/2019, Until Thu 8/29/2019, Normal      predniSONE 10 mg tablet Five tablets for 2 days, 4 tablets for 3 days, 3 tablets for 3 days, 2 tablets for 2 days, 1 tablet for 1 day , Normal      traMADol (ULTRAM) 50 mg tablet Take 1 tablet (50 mg total) by mouth every 6 (six) hours as needed for moderate pain, Starting Fri 3/15/2019, Normal               ED Provider  Attending physically available and evaluated Sarah Schaeffer  TANIA managed the patient along with the ED Attending      Electronically Signed by         Neil Whaley DO  08/16/19 5226

## 2019-08-15 NOTE — ED NOTES
Pt denies any recent trauma but does prolonged driving and sitting     Souleymane Galeas RN  08/15/19 9641

## 2019-08-15 NOTE — PATIENT INSTRUCTIONS

## 2019-08-17 NOTE — ED ATTENDING ATTESTATION
Sarah Lancaster DO, saw and evaluated the patient  I have discussed the patient with the resident/non-physician practitioner and agree with the resident's/non-physician practitioner's findings, Plan of Care, and MDM as documented in the resident's/non-physician practitioner's note, except where noted  All available labs and Radiology studies were reviewed  I was present for key portions of any procedure(s) performed by the resident/non-physician practitioner and I was immediately available to provide assistance  At this point I agree with the current assessment done in the Emergency Department  I have conducted an independent evaluation of this patient a history and physical is as follows:    Patient is a 33-year-old male presenting for left lower back pain with radiation down his left leg terminating at the level of the knee  Patient was seen by his primary care physician who initiated a course of prednisone as well as an IM injection of Toradol  Patient states he was on a recent long car ride when she was sitting for a long period of time  Has had previous history of sciatica  Exam is consistent with sciatica  There is no rash  There is no focal motor sensory neurological deficits  No bowel or bladder contents retention  No perineal anesthesia  It ambulate without difficulty  Patient was treated with IM analgesia the emergency department  Continue outpatient course of prednisone and patient was also provided information on the [facility]  Patient is not immunocompromised with no recent direct trauma        Critical Care Time  Procedures

## 2019-08-27 NOTE — PROGRESS NOTES
Assessment/Plan:      Diagnoses and all orders for this visit:    Chronic left-sided low back pain with left-sided sciatica  -     MRI lumbar spine wo contrast; Future    DDD (degenerative disc disease), lumbar  -     MRI lumbar spine wo contrast; Future    Sciatic leg pain  -     predniSONE 10 mg tablet; Five tablets for 2 days, 4 tablets for 3 days, 3 tablets for 3 days, 2 tablets for 2 days, 1 tablet for 1 day  Acute midline low back pain with left-sided sciatica  -     predniSONE 10 mg tablet; Five tablets for 2 days, 4 tablets for 3 days, 3 tablets for 3 days, 2 tablets for 2 days, 1 tablet for 1 day  Discussion:  61-year-old right-hand dominant male presenting for chronic low back pain  X-ray reviewed in office revealing diffuse degenerative changes throughout the lumbar spine advance for patient's age  He has completed physician directed home exercise program since February in addition to prescription medications including NSAIDs, muscle relaxers, tramadol and gabapentin without resolution of pain/symptoms  He is unable to attend formal physical therapy at this time  Did provide further home stretching and exercises utilizing Christina technique  We will order MRI of lumbar spine to evaluate for radiculopathy  To consider transforaminal epidural steroid injection pending results of imaging  We will additionally refill prednisone taper as patient was unable to complete her original prescription  I discussed with the patient that at this point in time since I feel that there is a significant inflammatory component to their pain symptoms, that they would benefit from a titrating dose of oral steroids over the next 6 days  I advised the patient that while on the steroids, they should not take any other oral NSAIDs except for acetaminophen or Tylenol until they have completed the steroid taper    I also advised them that once they have completed the steroid taper, they are to give our office a follow-up phone call to let us know how they were doing and if there are any signs of improvement  The patient was agreeable and verbalized an understanding  Subjective:     Patient ID: Marily Rader is a 37 y o  male  HPI Referral  for chronic back pain with acute exacerbation x 2 months week without trauma or LEXX  Was placed on tramadol and prednisone taper from PCP however did not complete prednisone therapy  XR of lumbar spine revealing advanced degenerative change for his age, but unable to obtain MRI insurance approval   PCP has provided physician guided HEP in February which he continues to utilize twice daily  He is unable to attend formal physical therapy due to school and work schedule  July 27th most recent exacerbation after long car trip from [place]  Patient has history of wear and tear injuries in the [employer] but denies previous surgeries or injection therapies  Left sided back pain with radiation to left thigh and lateral calf distribution  Denies numbness tingling weakness bowel or bladder changes or saddle anesthesia  Describes pain as a throbbing squeezing debilitating pain  Constant pain with limited relief  Rates pain as a 5 to 7/10 on pain scale today  Pain varies  More comfortable with standing vs sitting  Sitting >1 hour exacerbates pain  ADLs limited to pain  PAST MEDICAL HISTORY  Past Medical History:   Diagnosis Date    Arthritis      PAST SURGICAL HISTORY  No past surgical history on file  FAMILY HISTORY  No family history on file    HOME MEDICATIONS  Current Outpatient Medications   Medication Sig Dispense Refill    diclofenac (VOLTAREN) 75 mg EC tablet TAKE 1 TABLET (75 MG TOTAL) BY MOUTH 2 (TWO) TIMES A DAY FOR 30 DAYS 60 tablet 0    methocarbamol (ROBAXIN) 750 mg tablet Take 1 tablet (750 mg total) by mouth every 6 (six) hours as needed for muscle spasms for up to 14 days 30 tablet 1    predniSONE 10 mg tablet Five tablets for 2 days, 4 tablets for 3 days, 3 tablets for 3 days, 2 tablets for 2 days, 1 tablet for 1 day  36 tablet 0    colchicine (COLCRYS) 0 6 mg tablet Take 1 tablet (0 6 mg total) by mouth once for 1 dose 1 tablet 0    traMADol (ULTRAM) 50 mg tablet Take 1 tablet (50 mg total) by mouth every 6 (six) hours as needed for moderate pain (Patient not taking: Reported on 8/28/2019) 20 tablet 0     No current facility-administered medications for this visit  ALLERGIES  Gabapentin      SOCIAL HISTORY  Social History     Substance and Sexual Activity   Alcohol Use No     Social History     Substance and Sexual Activity   Drug Use Never     Social History     Tobacco Use   Smoking Status Never Smoker   Smokeless Tobacco Never Used       Review of Systems   Constitutional: Positive for activity change  Negative for chills, fever and unexpected weight change  HENT: Negative for trouble swallowing  Eyes: Negative for visual disturbance  Respiratory: Negative for shortness of breath  Cardiovascular: Negative for chest pain and leg swelling  Gastrointestinal: Negative for constipation, diarrhea, nausea and vomiting  Endocrine: Negative for polydipsia, polyphagia and polyuria  Genitourinary: Negative for decreased urine volume, difficulty urinating and urgency  Musculoskeletal: Positive for back pain  Negative for gait problem, joint swelling, myalgias, neck pain and neck stiffness  Skin: Negative for color change, pallor and rash  Allergic/Immunologic: Negative for immunocompromised state  Neurological: Negative for dizziness, syncope, weakness and numbness  Hematological: Does not bruise/bleed easily  Psychiatric/Behavioral: Positive for sleep disturbance  Objective:  Vitals:    08/28/19 1255   BP: 130/90   Pulse: 80       Imaging:  LUMBAR SPINE 3/13/19     INDICATION:   M54 40: Lumbago with sciatica, left side  M54 28: Sciatica, unspecified side  Low back pain since February 10, 2019    No known injury      COMPARISON:  None     VIEWS:  XR SPINE LUMBAR MINIMUM 4 VIEWS NON INJURY  Images: 5     FINDINGS:  There is no evidence of acute fracture or destructive osseous lesion      Alignment is unremarkable      Disc space narrowing diffusely throughout the lumbar spine with degenerative endplate changes  Diffuse facet hypertrophic changes      The pedicles appear intact      Soft tissues are unremarkable      IMPRESSION:  Degenerative changes, advanced for the patient's age      Workstation performed: [id number]   Physical Exam   Constitutional: He is oriented to person, place, and time  He appears well-developed and well-nourished  HENT:   Head: Normocephalic and atraumatic  Eyes: Pupils are equal, round, and reactive to light  Conjunctivae are normal    Neck: Neck supple  Cardiovascular: Intact distal pulses  Pulmonary/Chest: Effort normal  No respiratory distress  Musculoskeletal:        Lumbar back: He exhibits decreased range of motion and tenderness  He exhibits no deformity  Neurological: He is alert and oriented to person, place, and time  He has normal strength  He displays no atrophy  No cranial nerve deficit or sensory deficit  He exhibits normal muscle tone  He displays no seizure activity  Coordination and gait normal    Reflex Scores:       Tricep reflexes are 1+ on the right side and 1+ on the left side  Bicep reflexes are 1+ on the right side and 1+ on the left side  Brachioradialis reflexes are 1+ on the right side and 1+ on the left side  Patellar reflexes are 1+ on the right side and 1+ on the left side  Achilles reflexes are 1+ on the right side and 1+ on the left side   - SLR   Able to heel stand, toe stand, squat and rise    Skin: Skin is warm, dry and intact  No rash noted  No cyanosis  No pallor  Nails show no clubbing  Psychiatric: He has a normal mood and affect   His speech is normal and behavior is normal  Judgment and thought content normal  Cognition and memory are normal  He is attentive  Vitals reviewed

## 2019-08-28 ENCOUNTER — OFFICE VISIT (OUTPATIENT)
Dept: PAIN MEDICINE | Facility: CLINIC | Age: 34
End: 2019-08-28
Payer: COMMERCIAL

## 2019-08-28 VITALS
HEART RATE: 80 BPM | WEIGHT: 315 LBS | HEIGHT: 75 IN | SYSTOLIC BLOOD PRESSURE: 130 MMHG | BODY MASS INDEX: 39.17 KG/M2 | DIASTOLIC BLOOD PRESSURE: 90 MMHG

## 2019-08-28 DIAGNOSIS — M54.42 CHRONIC LEFT-SIDED LOW BACK PAIN WITH LEFT-SIDED SCIATICA: Primary | ICD-10-CM

## 2019-08-28 DIAGNOSIS — M51.36 DDD (DEGENERATIVE DISC DISEASE), LUMBAR: ICD-10-CM

## 2019-08-28 DIAGNOSIS — M54.42 ACUTE MIDLINE LOW BACK PAIN WITH LEFT-SIDED SCIATICA: ICD-10-CM

## 2019-08-28 DIAGNOSIS — G89.29 CHRONIC LEFT-SIDED LOW BACK PAIN WITH LEFT-SIDED SCIATICA: Primary | ICD-10-CM

## 2019-08-28 DIAGNOSIS — M54.30 SCIATIC LEG PAIN: ICD-10-CM

## 2019-08-28 PROCEDURE — 99244 OFF/OP CNSLTJ NEW/EST MOD 40: CPT | Performed by: PHYSICIAN ASSISTANT

## 2019-08-28 RX ORDER — PREDNISONE 10 MG/1
TABLET ORAL
Qty: 36 TABLET | Refills: 0 | Status: SHIPPED | OUTPATIENT
Start: 2019-08-28 | End: 2019-10-18 | Stop reason: ALTCHOICE

## 2019-08-28 NOTE — PATIENT INSTRUCTIONS
1  Will order MRI of lumbar spine  2  Follow up to review results  3  Continue physician directed home exercises as tolerated  Prednisone (By mouth)   Prednisone (PRED-ni-sone)  Treats many diseases and conditions, especially problems related to inflammation  This medicine is a corticosteroid  Brand Name(s): Contrast Allergy PreMed Pack, Desire, predniSONE Intensol   There may be other brand names for this medicine  When This Medicine Should Not Be Used: This medicine is not right for everyone  Do not use if you had an allergic reaction to prednisone or if you are pregnant  How to Use This Medicine:   Liquid, Tablet, Delayed Release Tablet  · Take your medicine as directed  Your dose may need to be changed several times to find what works best for you  · It is best to take this medicine with food or milk  · Swallow the delayed-release tablet whole  Do not crush, break, or chew it  · Measure the oral liquid medicine with a marked measuring spoon, oral syringe, or medicine cup  · Missed dose: Take a dose as soon as you remember  If it is almost time for your next dose, wait until then and take a regular dose  Do not take extra medicine to make up for a missed dose  · Store the medicine in a closed container at room temperature, away from heat, moisture, and direct light  Do not freeze the oral liquid  Drugs and Foods to Avoid:   Ask your doctor or pharmacist before using any other medicine, including over-the-counter medicines, vitamins, and herbal products    · Tell your doctor if you use any of the following:  ¨ Aminoglutethimide, amphotericin B, carbamazepine, cholestyramine, cyclosporine, digoxin, isoniazid, ketoconazole, phenobarbital, phenytoin, or rifampin  ¨ Blood thinner, such as warfarin  ¨ NSAID pain or arthritis medicine, such as aspirin, diclofenac, ibuprofen, naproxen, celecoxib  ¨ Diuretic (water pill)  ¨ Diabetes medicine  ¨ Macrolide antibiotic, such as azithromycin, clarithromycin, erythromycin  ¨ Estrogen, including birth control pills or hormone replacement therapy  · This medicine may interfere with vaccines  Ask your doctor before you get a flu shot or any other vaccines  Warnings While Using This Medicine:   · It is not safe to take this medicine during pregnancy  It could harm an unborn baby  Tell your doctor right away if you become pregnant  · Tell your doctor if you are breastfeeding or if you have kidney problems, heart failure, high blood pressure, a recent heart attack, diabetes, glaucoma, osteoporosis, or thyroid problems  Tell your doctor about any infection you have  Also tell your doctor if you have had mental or emotional problems (such as depression) or stomach or bowel problems (such as an ulcer or diverticulitis)  · This medicine may cause the following problems:  ¨ Mood or behavior changes  ¨ Higher blood pressure, retaining water, changes in salt or potassium levels in your body  ¨ Cataracts or glaucoma (with long-term use)  ¨ Weak bones or osteoporosis (with long-term use)  ¨ Slow growth in children (with long-term use)  ¨ Muscle problems (with high doses, especially if you have myasthenia gravis or similar nerve and muscle problems)  · Do not stop using this medicine suddenly  Your doctor will need to slowly decrease your dose before you stop it completely  · This medicine could cause you to get infections more easily  Tell your doctor right away if you are exposed to chicken pox, measles, or other serious infection  Tell your doctor if you had a serious infection in the past, such as tuberculosis or herpes  · Tell your doctor about any extra stress or anxiety in your life  Your dose might need to be changed for a short time  · Tell any doctor or dentist who treats you that you are using this medicine  This medicine may affect certain medical test results  · Keep all medicine out of the reach of children  Never share your medicine with anyone    Possible Side Effects While Using This Medicine:   Call your doctor right away if you notice any of these side effects:  · Allergic reaction: Itching or hives, swelling in your face or hands, swelling or tingling in your mouth or throat, chest tightness, trouble breathing  · Dark freckles, skin color changes, coldness, weakness, tiredness, nausea, vomiting, weight loss  · Depression, unusual thoughts, feelings, or behaviors, trouble sleeping  · Fever, chills, cough, sore throat, and body aches  · Muscle pain or weakness  · Rapid weight gain, swelling in your hands, ankles, or feet  · Severe stomach pain, nausea, vomiting, or red or black stools  · Skin changes or growths  · Trouble seeing, eye pain, headache  If you notice these less serious side effects, talk with your doctor:   · Increased appetite  · Round, puffy face  · Weight gain around your neck, upper back, breast, face, or waist  If you notice other side effects that you think are caused by this medicine, tell your doctor  Call your doctor for medical advice about side effects  You may report side effects to FDA at 5-497-FDA-4431  © 2017 [employer]  Information is for End User's use only and may not be sold, redistributed or otherwise used for commercial purposes  The above information is an  only  It is not intended as medical advice for individual conditions or treatments  Talk to your doctor, nurse or pharmacist before following any medical regimen to see if it is safe and effective for you  Lower Back Exercises   WHAT YOU NEED TO KNOW:   What do I need to know about lower back exercises? Lower back exercises help heal and strengthen your back muscles to prevent another injury  Ask your healthcare provider if you need to see a physical therapist for more advanced exercises  · Do the exercises on a mat or firm surface  (not on a bed) to support your spine and prevent low back pain  · Move slowly and smoothly    Avoid fast or jerky motions  · Breathe normally  Do not hold your breath  · Stop if you feel pain  It is normal to feel some discomfort at first  Regular exercise will help decrease your discomfort over time  How do I perform lower back exercises safely? Your healthcare provider may recommend that you do back exercises 10 to 30 minutes each day  He may also recommend that you do exercises 1 to 3 times each day  Ask your healthcare provider which exercises are best for you and how often to do them  · Ankle pumps:  Lie on your back  Move your foot up (with your toes pointing toward your head)  Then, move your foot down (with your toes pointing away from you)  Repeat this exercise 10 times on each side  · Heel slides:  Lie on your back  Slowly bend one leg and then straighten it  Next, bend the other leg and then straighten it  Repeat 10 times on each side  · Pelvic tilt:  Lie on your back with your knees bent and feet flat on the floor  Place your arms in a relaxed position beside your body  Tighten the muscles of your abdomen and flatten your back against the floor  Hold for 5 seconds  Repeat 5 times  · Back stretch:  Lie on your back with your hands behind your head  Bend your knees and turn the lower half of your body to one side  Hold this position for 10 seconds  Repeat 3 times on each side  · Straight leg raises:  Lie on your back with one leg straight  Bend the other knee  Tighten your abdomen and then slowly lift the straight leg up about 6 to 12 inches off the floor  Hold for 1 to 5 seconds  Lower your leg slowly  Repeat 10 times on each leg  · Knee-to-chest:  Lie on your back with your knees bent and feet flat on the floor  Pull one of your knees toward your chest and hold it there for 5 seconds  Return your leg to the starting position  Lift the other knee toward your chest and hold for 5 seconds  Do this 5 times on each side             · Cat and camel: Place your hands and knees on the floor  Arch your back upward toward the ceiling and lower your head  Round out your spine as much as you can  Hold for 5 seconds  Lift your head upward and push your chest downward toward the floor  Hold for 5 seconds  Do 3 sets or as directed  · Wall squats:  Stand with your back against a wall  Tighten the muscles of your abdomen  Slowly lower your body until your knees are bent at a 45 degree angle  Hold this position for 5 seconds  Slowly move back up to a standing position  Repeat 10 times  · Curl up:  Lie on your back with your knees bent and feet flat on the floor  Place your hands, palms down, underneath the curve in your lower back  Next, with your elbows on the floor, lift your shoulders and chest 2 to 3 inches  Keep your head in line with your shoulders  Hold this position for 5 seconds  When you can do this exercise without pain for 10 to 15 seconds, you may add a rotation  While your shoulders and chest are lifted off the ground, turn slightly to the left and hold  Repeat on the other side  · Bird dog:  Place your hands and knees on the floor  Keep your wrists directly below your shoulders and your knees directly below your hips  Pull your belly button in toward your spine  Do not flatten or arch your back  Tighten your abdominal muscles  Raise one arm straight out so that it is aligned with your head  Next, raise the leg opposite your arm  Hold this position for 15 seconds  Lower your arm and leg slowly and change sides  Do 5 sets  When should I seek immediate care? · You have severe pain that prevents you from moving  When should I contact my healthcare provider? · Your pain becomes worse  · You have new pain  · You have questions or concerns about your condition or care  CARE AGREEMENT:   You have the right to help plan your care  Learn about your health condition and how it may be treated   Discuss treatment options with your caregivers to decide what care you want to receive  You always have the right to refuse treatment  The above information is an  only  It is not intended as medical advice for individual conditions or treatments  Talk to your doctor, nurse or pharmacist before following any medical regimen to see if it is safe and effective for you  © 2017 [employer] Information is for End User's use only and may not be sold, redistributed or otherwise used for commercial purposes  All illustrations and images included in CareNotes® are the copyrighted property of [employer]  or [employer]

## 2019-09-04 ENCOUNTER — TELEPHONE (OUTPATIENT)
Dept: FAMILY MEDICINE CLINIC | Facility: CLINIC | Age: 34
End: 2019-09-04

## 2019-09-04 NOTE — TELEPHONE ENCOUNTER
Called patient, LMOM patient needs to make an appointment to be seen  We have FMLA papers here to be completed by BT  Per BT, he will not complete without seeing patient

## 2019-09-07 DIAGNOSIS — M25.539 CHRONIC WRIST PAIN, UNSPECIFIED LATERALITY: ICD-10-CM

## 2019-09-07 DIAGNOSIS — G89.29 CHRONIC WRIST PAIN, UNSPECIFIED LATERALITY: ICD-10-CM

## 2019-09-09 RX ORDER — DICLOFENAC SODIUM 75 MG/1
75 TABLET, DELAYED RELEASE ORAL 2 TIMES DAILY
Qty: 60 TABLET | Refills: 0 | Status: SHIPPED | OUTPATIENT
Start: 2019-09-09 | End: 2019-12-28 | Stop reason: SDUPTHER

## 2019-10-18 ENCOUNTER — OFFICE VISIT (OUTPATIENT)
Dept: FAMILY MEDICINE CLINIC | Facility: CLINIC | Age: 34
End: 2019-10-18
Payer: COMMERCIAL

## 2019-10-18 ENCOUNTER — APPOINTMENT (OUTPATIENT)
Dept: LAB | Facility: CLINIC | Age: 34
End: 2019-10-18
Payer: COMMERCIAL

## 2019-10-18 VITALS
BODY MASS INDEX: 39.17 KG/M2 | TEMPERATURE: 98 F | HEART RATE: 64 BPM | HEIGHT: 75 IN | RESPIRATION RATE: 18 BRPM | DIASTOLIC BLOOD PRESSURE: 88 MMHG | WEIGHT: 315 LBS | SYSTOLIC BLOOD PRESSURE: 118 MMHG

## 2019-10-18 DIAGNOSIS — Z00.00 HEALTH CARE MAINTENANCE: Primary | ICD-10-CM

## 2019-10-18 DIAGNOSIS — Z23 ENCOUNTER FOR IMMUNIZATION: ICD-10-CM

## 2019-10-18 DIAGNOSIS — E66.01 MORBID OBESITY WITH BMI OF 40.0-44.9, ADULT (HCC): ICD-10-CM

## 2019-10-18 DIAGNOSIS — M54.30 SCIATIC LEG PAIN: ICD-10-CM

## 2019-10-18 LAB
ALBUMIN SERPL BCP-MCNC: 4.6 G/DL (ref 3.5–5)
ALP SERPL-CCNC: 120 U/L (ref 46–116)
ALT SERPL W P-5'-P-CCNC: 41 U/L (ref 12–78)
ANION GAP SERPL CALCULATED.3IONS-SCNC: 8 MMOL/L (ref 4–13)
AST SERPL W P-5'-P-CCNC: 23 U/L (ref 5–45)
BASOPHILS # BLD AUTO: 0.12 THOUSANDS/ΜL (ref 0–0.1)
BASOPHILS NFR BLD AUTO: 1 % (ref 0–1)
BILIRUB SERPL-MCNC: 0.35 MG/DL (ref 0.2–1)
BUN SERPL-MCNC: 16 MG/DL (ref 5–25)
CALCIUM SERPL-MCNC: 9.4 MG/DL (ref 8.3–10.1)
CHLORIDE SERPL-SCNC: 106 MMOL/L (ref 100–108)
CHOLEST SERPL-MCNC: 201 MG/DL (ref 50–200)
CO2 SERPL-SCNC: 26 MMOL/L (ref 21–32)
CREAT SERPL-MCNC: 1.36 MG/DL (ref 0.6–1.3)
EOSINOPHIL # BLD AUTO: 0.34 THOUSAND/ΜL (ref 0–0.61)
EOSINOPHIL NFR BLD AUTO: 3 % (ref 0–6)
ERYTHROCYTE [DISTWIDTH] IN BLOOD BY AUTOMATED COUNT: 15.9 % (ref 11.6–15.1)
GFR SERPL CREATININE-BSD FRML MDRD: 66 ML/MIN/1.73SQ M
GLUCOSE P FAST SERPL-MCNC: 120 MG/DL (ref 65–99)
HCT VFR BLD AUTO: 45.9 % (ref 36.5–49.3)
HDLC SERPL-MCNC: 29 MG/DL (ref 40–60)
HGB BLD-MCNC: 14.5 G/DL (ref 12–17)
IMM GRANULOCYTES # BLD AUTO: 0.12 THOUSAND/UL (ref 0–0.2)
IMM GRANULOCYTES NFR BLD AUTO: 1 % (ref 0–2)
LDLC SERPL CALC-MCNC: 98 MG/DL (ref 0–100)
LYMPHOCYTES # BLD AUTO: 3.94 THOUSANDS/ΜL (ref 0.6–4.47)
LYMPHOCYTES NFR BLD AUTO: 39 % (ref 14–44)
MCH RBC QN AUTO: 25.6 PG (ref 26.8–34.3)
MCHC RBC AUTO-ENTMCNC: 31.6 G/DL (ref 31.4–37.4)
MCV RBC AUTO: 81 FL (ref 82–98)
MONOCYTES # BLD AUTO: 0.73 THOUSAND/ΜL (ref 0.17–1.22)
MONOCYTES NFR BLD AUTO: 7 % (ref 4–12)
NEUTROPHILS # BLD AUTO: 4.99 THOUSANDS/ΜL (ref 1.85–7.62)
NEUTS SEG NFR BLD AUTO: 49 % (ref 43–75)
NRBC BLD AUTO-RTO: 0 /100 WBCS
PLATELET # BLD AUTO: 317 THOUSANDS/UL (ref 149–390)
PMV BLD AUTO: 10 FL (ref 8.9–12.7)
POTASSIUM SERPL-SCNC: 4.3 MMOL/L (ref 3.5–5.3)
PROT SERPL-MCNC: 8.1 G/DL (ref 6.4–8.2)
RBC # BLD AUTO: 5.66 MILLION/UL (ref 3.88–5.62)
SODIUM SERPL-SCNC: 140 MMOL/L (ref 136–145)
TRIGL SERPL-MCNC: 370 MG/DL
TSH SERPL DL<=0.05 MIU/L-ACNC: 1.03 UIU/ML (ref 0.36–3.74)
WBC # BLD AUTO: 10.24 THOUSAND/UL (ref 4.31–10.16)

## 2019-10-18 PROCEDURE — 36415 COLL VENOUS BLD VENIPUNCTURE: CPT | Performed by: PHYSICIAN ASSISTANT

## 2019-10-18 PROCEDURE — 84443 ASSAY THYROID STIM HORMONE: CPT | Performed by: PHYSICIAN ASSISTANT

## 2019-10-18 PROCEDURE — 90686 IIV4 VACC NO PRSV 0.5 ML IM: CPT | Performed by: PHYSICIAN ASSISTANT

## 2019-10-18 PROCEDURE — 80061 LIPID PANEL: CPT | Performed by: PHYSICIAN ASSISTANT

## 2019-10-18 PROCEDURE — 80053 COMPREHEN METABOLIC PANEL: CPT | Performed by: PHYSICIAN ASSISTANT

## 2019-10-18 PROCEDURE — 99395 PREV VISIT EST AGE 18-39: CPT | Performed by: PHYSICIAN ASSISTANT

## 2019-10-18 PROCEDURE — 90471 IMMUNIZATION ADMIN: CPT | Performed by: PHYSICIAN ASSISTANT

## 2019-10-18 PROCEDURE — 85025 COMPLETE CBC W/AUTO DIFF WBC: CPT | Performed by: PHYSICIAN ASSISTANT

## 2019-10-18 NOTE — PATIENT INSTRUCTIONS
1  Health care maintenance-biometric screening form was filled out and will be retained in the red nursing folder  Blood work was ordered and patient will have blood work completed and then form can be completed and returned to patient  Flu vaccine given today  Rest of screening up-to-date  2  Chronic recurrent sciatica - patient is currently stable but has intermittent episodes  3   Obesity -continue with exercise and walking

## 2019-10-18 NOTE — PROGRESS NOTES
Assessment and Plan:  Patient Instructions     1  Health care maintenance-biometric screening form was filled out and will be retained in the red nursing folder  Blood work was ordered and patient will have blood work completed and then form can be completed and returned to patient  Flu vaccine given today  Rest of screening up-to-date  2  Chronic recurrent sciatica - patient is currently stable but has intermittent episodes  3   Obesity -continue with exercise and walking  Diagnoses and all orders for this visit:    Health care maintenance  -     CBC and differential  -     Comprehensive metabolic panel  -     Lipid Panel with Direct LDL reflex  -     TSH, 3rd generation with Free T4 reflex    Sciatic leg pain  -     CBC and differential  -     Comprehensive metabolic panel  -     Lipid Panel with Direct LDL reflex  -     TSH, 3rd generation with Free T4 reflex    Encounter for immunization  -     influenza vaccine, [id number], quadrivalent, 0 5 mL, preservative-free, for adult and pediatric patients 6 mos+ (AFLURIA, FLUARIX, FLULAVAL, FLUZONE)    Morbid obesity with BMI of 40 0-44 9, adult (HCC)  -     CBC and differential  -     Comprehensive metabolic panel  -     Lipid Panel with Direct LDL reflex  -     TSH, 3rd generation with Free T4 reflex              Subjective:      Patient ID: Rad Shelton is a 37 y o  male  CC:    Chief Complaint   Patient presents with    Physical Exam     Patient present today for his Annual Physical Exam  Pt will also like to get scripts to get blood work done  HPI:     HPI:  This is a 78-year-old gentleman that presents to the office for health maintenance physical   He has a yearly biometric screening form to be filled out  This includes sugar and cholesterol testing  Patient has been feeling well recently without any acute complaints  He does have a history low back pain and sciatica which has been flaring up on an intermittent basis    His last significant flare-up was in August and he was out of work for a week  He has been doing well since  Patient does continue to stay physically active and does a lot of walking  He is interested in getting his flu vaccine today  The following portions of the patient's history were reviewed and updated as appropriate: allergies, current medications, past family history, past medical history, past social history, past surgical history and problem list       Review of Systems   Constitutional: Negative for chills, fatigue and fever  HENT: Negative for congestion, ear pain and sinus pressure  Eyes: Negative for visual disturbance  Respiratory: Negative for cough, chest tightness and shortness of breath  Cardiovascular: Negative for chest pain and palpitations  Gastrointestinal: Negative for diarrhea, nausea and vomiting  Endocrine: Negative for polyuria  Genitourinary: Negative for dysuria and frequency  Musculoskeletal: Negative for arthralgias and myalgias  Skin: Negative for pallor and rash  Neurological: Negative for dizziness, weakness, light-headedness, numbness and headaches  Psychiatric/Behavioral: Negative for agitation, behavioral problems and sleep disturbance  All other systems reviewed and are negative  Data to review:       Objective:    Vitals:    10/18/19 0855   BP: 118/88   BP Location: Left arm   Patient Position: Sitting   Cuff Size: Standard   Pulse: 64   Resp: 18   Temp: 98 °F (36 7 °C)   TempSrc: Temporal   Weight: (!) 147 kg (325 lb)   Height: 6' 3" (1 905 m)        Physical Exam   Constitutional: He is oriented to person, place, and time  He appears well-developed and well-nourished  No distress  HENT:   Head: Normocephalic and atraumatic  Right Ear: External ear normal    Left Ear: External ear normal    Nose: Nose normal    Mouth/Throat: Oropharynx is clear and moist  No oropharyngeal exudate  Eyes: Pupils are equal, round, and reactive to light  Conjunctivae and EOM are normal    Neck: Normal range of motion  Neck supple  No tracheal deviation present  No thyromegaly present  Cardiovascular: Normal rate, regular rhythm and normal heart sounds  Exam reveals no friction rub  No murmur heard  Pulmonary/Chest: Effort normal and breath sounds normal  No respiratory distress  He has no wheezes  He has no rales  Abdominal: Soft  Bowel sounds are normal  He exhibits no distension  There is no tenderness  There is no rebound and no guarding  Musculoskeletal: Normal range of motion  He exhibits no edema or tenderness  Lymphadenopathy:     He has no cervical adenopathy  Neurological: He is alert and oriented to person, place, and time  No cranial nerve deficit  Coordination normal    Skin: Skin is warm and dry  No rash noted  No erythema  Psychiatric: He has a normal mood and affect  His behavior is normal  Thought content normal    Nursing note and vitals reviewed  BMI Counseling: Body mass index is 40 62 kg/m²  The BMI is above normal  Nutrition recommendations include reducing portion sizes

## 2019-12-03 ENCOUNTER — OFFICE VISIT (OUTPATIENT)
Dept: FAMILY MEDICINE CLINIC | Facility: CLINIC | Age: 34
End: 2019-12-03
Payer: COMMERCIAL

## 2019-12-03 VITALS
WEIGHT: 315 LBS | TEMPERATURE: 98.3 F | BODY MASS INDEX: 39.17 KG/M2 | RESPIRATION RATE: 20 BRPM | HEART RATE: 82 BPM | HEIGHT: 75 IN | SYSTOLIC BLOOD PRESSURE: 136 MMHG | DIASTOLIC BLOOD PRESSURE: 88 MMHG

## 2019-12-03 DIAGNOSIS — M54.42 ACUTE MIDLINE LOW BACK PAIN WITH LEFT-SIDED SCIATICA: ICD-10-CM

## 2019-12-03 DIAGNOSIS — M54.30 SCIATIC LEG PAIN: Primary | ICD-10-CM

## 2019-12-03 PROCEDURE — 1036F TOBACCO NON-USER: CPT | Performed by: FAMILY MEDICINE

## 2019-12-03 PROCEDURE — 3008F BODY MASS INDEX DOCD: CPT | Performed by: FAMILY MEDICINE

## 2019-12-03 PROCEDURE — 99213 OFFICE O/P EST LOW 20 MIN: CPT | Performed by: FAMILY MEDICINE

## 2019-12-03 RX ORDER — PREDNISONE 10 MG/1
TABLET ORAL
Qty: 36 TABLET | Refills: 0 | Status: SHIPPED | OUTPATIENT
Start: 2019-12-03 | End: 2020-01-07 | Stop reason: SDUPTHER

## 2019-12-03 NOTE — PROGRESS NOTES
Assessment and Plan:Follow-up with Orthopedics    Problem List Items Addressed This Visit        Nervous and Auditory    Sciatic leg pain - Primary      The patient was placed on prednisone 10 mg tablets:  Five tablets for 2 days, 4 tablets for 3 days, 3 tablets for 3 days, 2 tablets for 2 days and 1 tablet for 1 day  Relevant Medications    predniSONE 10 mg tablet    Other Relevant Orders    Ambulatory referral to Orthopedic Surgery       Other    Acute midline low back pain with left-sided sciatica       Since this is the 3rd occurrence the patient is requesting a return to Orthopedics  Relevant Medications    predniSONE 10 mg tablet    Other Relevant Orders    Ambulatory referral to Orthopedic Surgery                 Diagnoses and all orders for this visit:    Sciatic leg pain  -     Ambulatory referral to Orthopedic Surgery; Future  -     predniSONE 10 mg tablet; Five tablets for 2 days, 4 tablets for 3 days, 3 tablets for 3 days, 2 tablets for 2 days, 1 tablet for 1 day  Acute midline low back pain with left-sided sciatica  -     Ambulatory referral to Orthopedic Surgery; Future  -     predniSONE 10 mg tablet; Five tablets for 2 days, 4 tablets for 3 days, 3 tablets for 3 days, 2 tablets for 2 days, 1 tablet for 1 day  Subjective:      Patient ID: Delicia Castro is a 37 y o  male  CC:  Left leg pain        HPI:     This is a 67-year-old male who comes in with left sided sciatic pain  He does not have any back pain per se at this time however he does have chronic low back pain  This has been a recurring problem and now this is the 3rd time that he has had this pain from his back to his left upper thigh  He has been seen by Orthopedics in the past and is requesting a return for further evaluation and treatment  In the past he was given a prednisone taper which helped considerably and he will be given the taper again today    His blood pressure is 136/88 , his weight is 324 lb down 1 lb from the previous visit and his BMI is 40 5  The following portions of the patient's history were reviewed and updated as appropriate: allergies, current medications, past family history, past medical history, past social history, past surgical history and problem list       Review of Systems   Musculoskeletal: Positive for gait problem  Left-sided leg pain  Data to review:       Objective:    Vitals:    12/03/19 0822   BP: 136/88   BP Location: Right arm   Patient Position: Supine   Cuff Size: Large   Pulse: 82   Resp: 20   Temp: 98 3 °F (36 8 °C)   TempSrc: Tympanic   Weight: (!) 147 kg (324 lb)   Height: 6' 3" (1 905 m)        Physical Exam   Constitutional: He is oriented to person, place, and time  He appears well-developed and well-nourished  HENT:   Head: Normocephalic  Right Ear: External ear normal    Left Ear: External ear normal    Mouth/Throat: Oropharynx is clear and moist    Eyes: Pupils are equal, round, and reactive to light  Conjunctivae and EOM are normal    Neck: Normal range of motion  Neck supple  Cardiovascular: Normal rate, regular rhythm and normal heart sounds  Pulmonary/Chest: Effort normal and breath sounds normal    Abdominal: Soft  Bowel sounds are normal    Musculoskeletal: He exhibits tenderness  He exhibits no edema or deformity  Tender to palpation left anterior thigh with limited range of motion with flexion and extension  Neurological: He is alert and oriented to person, place, and time  Skin: Skin is warm  Psychiatric: He has a normal mood and affect  His behavior is normal  Judgment and thought content normal        Body mass index is 40 5 kg/m²

## 2019-12-28 DIAGNOSIS — G89.29 CHRONIC WRIST PAIN, UNSPECIFIED LATERALITY: ICD-10-CM

## 2019-12-28 DIAGNOSIS — M25.539 CHRONIC WRIST PAIN, UNSPECIFIED LATERALITY: ICD-10-CM

## 2019-12-30 DIAGNOSIS — G89.29 CHRONIC WRIST PAIN, UNSPECIFIED LATERALITY: ICD-10-CM

## 2019-12-30 DIAGNOSIS — M25.539 CHRONIC WRIST PAIN, UNSPECIFIED LATERALITY: ICD-10-CM

## 2019-12-30 RX ORDER — DICLOFENAC SODIUM 75 MG/1
75 TABLET, DELAYED RELEASE ORAL 2 TIMES DAILY
Qty: 60 TABLET | Refills: 1 | Status: SHIPPED | OUTPATIENT
Start: 2019-12-30 | End: 2020-02-06 | Stop reason: SDUPTHER

## 2019-12-30 RX ORDER — DICLOFENAC SODIUM 75 MG/1
75 TABLET, DELAYED RELEASE ORAL 2 TIMES DAILY
Qty: 60 TABLET | Refills: 0 | Status: SHIPPED | OUTPATIENT
Start: 2019-12-30 | End: 2020-01-13 | Stop reason: SDUPTHER

## 2020-01-01 ENCOUNTER — APPOINTMENT (EMERGENCY)
Dept: RADIOLOGY | Facility: HOSPITAL | Age: 35
End: 2020-01-01
Payer: COMMERCIAL

## 2020-01-01 ENCOUNTER — HOSPITAL ENCOUNTER (EMERGENCY)
Facility: HOSPITAL | Age: 35
Discharge: HOME | End: 2020-01-01
Attending: EMERGENCY MEDICINE | Admitting: EMERGENCY MEDICINE
Payer: COMMERCIAL

## 2020-01-01 VITALS
DIASTOLIC BLOOD PRESSURE: 97 MMHG | TEMPERATURE: 98.2 F | HEART RATE: 86 BPM | SYSTOLIC BLOOD PRESSURE: 145 MMHG | OXYGEN SATURATION: 95 % | RESPIRATION RATE: 14 BRPM

## 2020-01-01 DIAGNOSIS — L03.114 CELLULITIS OF LEFT HAND: Primary | ICD-10-CM

## 2020-01-01 PROCEDURE — 99283 EMERGENCY DEPT VISIT LOW MDM: CPT

## 2020-01-01 PROCEDURE — 73110 X-RAY EXAM OF WRIST: CPT

## 2020-01-01 PROCEDURE — 96372 THER/PROPH/DIAG INJ SC/IM: CPT

## 2020-01-01 PROCEDURE — 99284 EMERGENCY DEPT VISIT MOD MDM: CPT | Performed by: PHYSICIAN ASSISTANT

## 2020-01-01 PROCEDURE — 96365 THER/PROPH/DIAG IV INF INIT: CPT

## 2020-01-01 PROCEDURE — 73130 X-RAY EXAM OF HAND: CPT

## 2020-01-01 PROCEDURE — 99243 OFF/OP CNSLTJ NEW/EST LOW 30: CPT | Performed by: PLASTIC SURGERY

## 2020-01-01 RX ORDER — CEPHALEXIN 500 MG/1
500 CAPSULE ORAL EVERY 6 HOURS SCHEDULED
Qty: 20 CAPSULE | Refills: 0 | Status: SHIPPED | OUTPATIENT
Start: 2020-01-01 | End: 2020-01-06

## 2020-01-01 RX ORDER — CEFAZOLIN SODIUM 2 G/50ML
2000 SOLUTION INTRAVENOUS ONCE
Status: COMPLETED | OUTPATIENT
Start: 2020-01-01 | End: 2020-01-01

## 2020-01-01 RX ORDER — KETOROLAC TROMETHAMINE 30 MG/ML
15 INJECTION, SOLUTION INTRAMUSCULAR; INTRAVENOUS ONCE
Status: COMPLETED | OUTPATIENT
Start: 2020-01-01 | End: 2020-01-01

## 2020-01-01 RX ADMIN — CEFAZOLIN SODIUM 2000 MG: 2 SOLUTION INTRAVENOUS at 10:50

## 2020-01-01 RX ADMIN — KETOROLAC TROMETHAMINE 15 MG: 30 INJECTION, SOLUTION INTRAMUSCULAR at 08:16

## 2020-01-01 NOTE — ED ATTENDING ATTESTATION
Boris Morales MD, saw and evaluated the patient  All available labs and X-rays were ordered by me or the resident and have been reviewed by myself  I discussed the patient with the resident / non-physician and agree with the resident's / non-physician practitioner's findings and plan as documented in the resident's / non-physician practicitioner's note, except where noted  At this point, I agree with the current assessment done in the ED  I was present during key portions of all procedures performed unless otherwise stated  Chief Complaint   Patient presents with    Hand Swelling     patient has a hx of arthritis, having L hand swelling and tingling in fingers     This is a 37 y o  y/o M presenting for LEFT hang pain/swelling  He has a hx of using his knuckles to do pushups since he was in the [employer]  Lately, especially today has had severe pain in the hand only on the left with swelling  It's red overlying it  Able to somewhat range it but can't do finger apposition  He has rednes on the dorsal aspect  No hx of similar  No f/ch/nv/c/p/sob  M/U/R/A nerve sensation intact   strength reduced due to pain in the left  Can't do Finger abduction/adduction/opposition on left without severe pain  Can start it but can't finish  Suppination/Pronation intact elicits severe pain  Good capillary refill  2+ radial pulses, bilaterally equal    BICEPS/TRICEPS 5/5  Shoulder abduction/adduction 5/5  General: VS reviewed  Appears in NAD  awake, alert  Well-nourished, well-developed  Appears stated age  Speaking normally in full sentences  Head: Normocephalic, atraumatic  Eyes: EOM-I  No diplopia  No hyphema  No subconjunctival hemorrhages  Symmetrical lids  ENT: Atraumatic external nose and ears  MMM  No malocclusion  No stridor  Normal phonation  No drooling  Normal swallowing  Neck: No JVD    CV: No pallor noted  Lungs:   No tachypnea  No respiratory distress  MSK:   FROM spontaneously except as above   Skin: Dry, intact  Neuro: Awake, alert, GCS15, CN II-XII grossly intact  Motor grossly intact  Psychiatric/Behavioral: Appropriate mood and affect   Exam: deferred  Vitals:    01/01/20 0745   BP: 145/97   BP Location: Right arm   Pulse: 86   Resp: 14   Temp: 98 2 °F (36 8 °C)   TempSrc: Oral   SpO2: 95%   - 13 point ROS was performed and all are normal unless stated in the history above  - Nursing note reviewed  Vitals reviewed  - Orders placed by myself and/or advanced practitioner / resident     - Previous chart was reviewed  - No language barrier    - History obtained from patient  - There are no limitations to the history obtained  - Critical care time: Not applicable for this patient  Code Status: No Order  Advance Directive and Living Will:      Power of :    POLST:      Final Diagnosis:  1  Cellulitis of left hand        ED Course as of Jan 03 1548   Wed Jan 01, 2020   0810 26 y/o M hx of arthritis  Former , does push ups on knuckles  Medications   ketorolac (TORADOL) injection 15 mg (15 mg Intramuscular Given 1/1/20 0816)   ceFAZolin (ANCEF) IVPB (premix) 2,000 mg (0 mg Intravenous Stopped 1/1/20 1109)     XR hand 3+ views LEFT   ED Interpretation   Arthritic changes noted  No acute abnormalities identified by me  Final Result      Swelling of the hand  No acute findings  Possible small degenerative cysts in the bones of the wrist             Workstation performed: [id number]         XR wrist 3+ views LEFT   ED Interpretation   Arthritic changes noted  Final Result      Benign-appearing lucencies in the proximal carpal row may be degenerative cysts    No acute abnormality seen            Workstation performed: [id number]           Orders Placed This Encounter   Procedures    XR hand 3+ views LEFT    XR wrist 3+ views LEFT    Consult to surgery plastic     Labs Reviewed - No data to display  Time reflects when diagnosis was documented in both MDM as applicable and the Disposition within this note     Time User Action Codes Description Comment    1/1/2020  8:06 AM [place] Reusing Add [M79 89] Swelling of left hand     1/1/2020 10:31 AM Ephraim Reusing Add [N55 082] Cellulitis of left hand     1/1/2020 10:31 AM Ephraim Reusing Modify [R92 271] Cellulitis of left hand     1/1/2020 10:31 AM [place] Reusing Remove [M79 89] Swelling of left hand       ED Disposition     ED Disposition Condition Date/Time Comment    Discharge Stable Wed Jan 1, 2020  8:07 AM Margi Bennett discharge to home/self care  Follow-up Information     Follow up With Specialties Details Why [facility] [address], MD Plastic Surgery, Wound Care In 2 days F/U on this Friday   [address] [place] [address] At [facility]          Discharge Medication List as of 1/1/2020 10:45 AM      START taking these medications    Details   cephalexin (KEFLEX) 500 mg capsule Take 1 capsule (500 mg total) by mouth every 6 (six) hours for 5 days, Starting Wed 1/1/2020, Until Mon 1/6/2020, Normal         CONTINUE these medications which have NOT CHANGED    Details   colchicine (COLCRYS) 0 6 mg tablet Take 1 tablet (0 6 mg total) by mouth once for 1 dose, Starting Sat 3/16/2019, Print      !! diclofenac (VOLTAREN) 75 mg EC tablet Take 1 tablet (75 mg total) by mouth 2 (two) times a day, Starting Mon 12/30/2019, Until Wed 1/29/2020, Normal      !! diclofenac (VOLTAREN) 75 mg EC tablet Take 1 tablet (75 mg total) by mouth 2 (two) times a day, Starting Mon 12/30/2019, Until Wed 1/29/2020, Normal      methocarbamol (ROBAXIN) 750 mg tablet Take 1 tablet (750 mg total) by mouth every 6 (six) hours as needed for muscle spasms for up to 14 days, Starting u 8/15/2019, Until u 8/29/2019, Normal      predniSONE 10 mg tablet Five tablets for 2 days, 4 tablets for 3 days, 3 tablets for 3 days, 2 tablets for 2 days, 1 tablet for 1 day , Normal      traMADol (ULTRAM) 50 mg tablet Take 1 tablet (50 mg total) by mouth every 6 (six) hours as needed for moderate pain, Starting Fri 3/15/2019, Normal       !! - Potential duplicate medications found  Please discuss with provider  No discharge procedures on file  Prior to Admission Medications   Prescriptions Last Dose Informant Patient Reported? Taking?   colchicine (COLCRYS) 0 6 mg tablet   No No   Sig: Take 1 tablet (0 6 mg total) by mouth once for 1 dose   Patient not taking: Reported on 12/3/2019   diclofenac (VOLTAREN) 75 mg EC tablet   No No   Sig: Take 1 tablet (75 mg total) by mouth 2 (two) times a day   diclofenac (VOLTAREN) 75 mg EC tablet   No No   Sig: Take 1 tablet (75 mg total) by mouth 2 (two) times a day   methocarbamol (ROBAXIN) 750 mg tablet   No No   Sig: Take 1 tablet (750 mg total) by mouth every 6 (six) hours as needed for muscle spasms for up to 14 days   Patient not taking: Reported on 12/3/2019   predniSONE 10 mg tablet   No No   Sig: Five tablets for 2 days, 4 tablets for 3 days, 3 tablets for 3 days, 2 tablets for 2 days, 1 tablet for 1 day  traMADol (ULTRAM) 50 mg tablet   No No   Sig: Take 1 tablet (50 mg total) by mouth every 6 (six) hours as needed for moderate pain   Patient not taking: Reported on 8/28/2019      Facility-Administered Medications: None       Portions of the record may have been created with voice recognition software  Occasional wrong word or "sound a like" substitutions may have occurred due to the inherent limitations of voice recognition software  Read the chart carefully and recognize, using context, where substitutions have occurred      Electronically signed by:  Salud Sheldon

## 2020-01-01 NOTE — ED PROVIDER NOTES
History  Chief Complaint   Patient presents with    Hand Swelling     patient has a hx of arthritis, having L hand swelling and tingling in fingers     41-year-old male presents emergency department with concern for left hand swelling  He states the swelling has been present for about a week  He does have a history of arthritis in his hand however it is not been swollen like this before  He has taken some anti-inflammatory given medications with minimal relief however the swelling has returned  He states that he has abnormal sensation in the index finger and thumb of the left hand as well  He is able to move all the fingers has good cap refill  No history blood clotting disorder or trauma to the hand  He has a history of  service an still frequently does pushups on his knuckles  Allergies reviewed          Prior to Admission Medications   Prescriptions Last Dose Informant Patient Reported? Taking?   colchicine (COLCRYS) 0 6 mg tablet   No No   Sig: Take 1 tablet (0 6 mg total) by mouth once for 1 dose   Patient not taking: Reported on 12/3/2019   diclofenac (VOLTAREN) 75 mg EC tablet   No No   Sig: Take 1 tablet (75 mg total) by mouth 2 (two) times a day   diclofenac (VOLTAREN) 75 mg EC tablet   No No   Sig: Take 1 tablet (75 mg total) by mouth 2 (two) times a day   methocarbamol (ROBAXIN) 750 mg tablet   No No   Sig: Take 1 tablet (750 mg total) by mouth every 6 (six) hours as needed for muscle spasms for up to 14 days   Patient not taking: Reported on 12/3/2019   predniSONE 10 mg tablet   No No   Sig: Five tablets for 2 days, 4 tablets for 3 days, 3 tablets for 3 days, 2 tablets for 2 days, 1 tablet for 1 day     traMADol (ULTRAM) 50 mg tablet   No No   Sig: Take 1 tablet (50 mg total) by mouth every 6 (six) hours as needed for moderate pain   Patient not taking: Reported on 8/28/2019      Facility-Administered Medications: None       Past Medical History:   Diagnosis Date    Arthritis History reviewed  No pertinent surgical history  Family History   Problem Relation Age of Onset    Hypertension Mother     No Known Problems Father      I have reviewed and agree with the history as documented  Social History     Tobacco Use    Smoking status: Never Smoker    Smokeless tobacco: Never Used   Substance Use Topics    Alcohol use: No    Drug use: Never        Review of Systems   All other systems reviewed and are negative  Physical Exam  Physical Exam   Constitutional: He is oriented to person, place, and time  Vital signs are normal  He appears well-developed and well-nourished  He is cooperative  He does not appear ill  No distress  HENT:   Head: Normocephalic and atraumatic  Right Ear: External ear normal    Left Ear: External ear normal    Nose: Nose normal    Mouth/Throat: Oropharynx is clear and moist    Eyes: Pupils are equal, round, and reactive to light  EOM are normal    Neck: Normal range of motion  Neck supple  Cardiovascular: Normal rate, regular rhythm, normal heart sounds and intact distal pulses  Exam reveals no gallop and no friction rub  No murmur heard  Pulmonary/Chest: Effort normal and breath sounds normal  No stridor  No respiratory distress  He has no wheezes  He has no rales  Abdominal: Soft  Bowel sounds are normal  He exhibits no distension  There is no tenderness  There is no guarding  Musculoskeletal:        Left hand: He exhibits decreased range of motion, tenderness and swelling  Decreased strength noted  He exhibits thumb/finger opposition  Significant swelling of the left hand with some loss of bony landmarks noted  Patient reports tenderness with movement of the fingers and pain over the ulnar styloid  Swelling is primarily evident on the dorsum of the hand  There is no evidence of trauma  There is no erythema or warmth of the hand  Pulses are present  Good cap refill    Pain does not extend past the wrist   Patient has difficulty with opposition of fingers  Neurological: He is alert and oriented to person, place, and time  Skin: Skin is warm  Capillary refill takes less than 2 seconds  Nursing note and vitals reviewed  Vital Signs  ED Triage Vitals [01/01/20 0745]   Temperature Pulse Respirations Blood Pressure SpO2   98 2 °F (36 8 °C) 86 14 145/97 95 %      Temp Source Heart Rate Source Patient Position - Orthostatic VS BP Location FiO2 (%)   Oral Monitor Sitting Right arm --      Pain Score       5           Vitals:    01/01/20 0745   BP: 145/97   Pulse: 86   Patient Position - Orthostatic VS: Sitting         Visual Acuity      ED Medications  Medications   ketorolac (TORADOL) injection 15 mg (15 mg Intramuscular Given 1/1/20 0816)   ceFAZolin (ANCEF) IVPB (premix) 2,000 mg (0 mg Intravenous Stopped 1/1/20 1109)       Diagnostic Studies  Results Reviewed     None                 XR hand 3+ views LEFT   ED Interpretation by Torres Noel PA-C ([id number])   Arthritic changes noted  No acute abnormalities identified by me  XR wrist 3+ views LEFT   ED Interpretation by Torres Noel PA-C ([id number])   Arthritic changes noted  Procedures  Procedures         ED Course  ED Course as of Jan 01 1336   Wed Jan 01, 2020   0851 Ortho paged       3833 Dr Goldie Blount (plastics) will come evaluate the patient in approximately 1 hr        1028 Plastics at bedside                                  MDM  Number of Diagnoses or Management Options  Cellulitis of left hand: new and requires workup  Diagnosis management comments: Likely cellulitis  Patient evaluated by Dr Goldie Blount from Plastic surgery and agrees with likely cellulitis  IV Ancef given the emergency department  Patient discharged home with p o  Keflex  Dr Goldie Blount will be following up the patient at his office in 2 days  Patient educated regarding their diagnosis and given return and follow-up instructions    Patient is understanding and in agreement with the treatment plan  There are no questions at the time of discharge  Amount and/or Complexity of Data Reviewed  Tests in the radiology section of CPT®: ordered and reviewed  Independent visualization of images, tracings, or specimens: yes    Risk of Complications, Morbidity, and/or Mortality  Presenting problems: low  Diagnostic procedures: low  Management options: low    Patient Progress  Patient progress: stable        Disposition  Final diagnoses:   Cellulitis of left hand     Time reflects when diagnosis was documented in both MDM as applicable and the Disposition within this note     Time User Action Codes Description Comment    1/1/2020  8:06 AM Wang Pencil Add [M79 89] Swelling of left hand     1/1/2020 10:31 AM Wang Pencil Add [J56 124] Cellulitis of left hand     1/1/2020 10:31 AM Wang Pencil Modify [L03 114] Cellulitis of left hand     1/1/2020 10:31 AM Wang Pencil Remove [M79 89] Swelling of left hand       ED Disposition     ED Disposition Condition Date/Time Comment    Discharge Stable Wed Jan 1, 2020  8:07 AM Renard Deleon discharge to home/self care  Follow-up Information     Follow up With Specialties Details Why [address] [facility] [address], MD Plastic Surgery, Wound Care In 2 days F/U on this Friday   [address] [place] [address]  [address]  [address] [place] [address]            Discharge Medication List as of 1/1/2020 10:45 AM      START taking these medications    Details   cephalexin (KEFLEX) 500 mg capsule Take 1 capsule (500 mg total) by mouth every 6 (six) hours for 5 days, Starting Wed 1/1/2020, Until Mon 1/6/2020, Normal         CONTINUE these medications which have NOT CHANGED    Details   colchicine (COLCRYS) 0 6 mg tablet Take 1 tablet (0 6 mg total) by mouth once for 1 dose, Starting Sat 3/16/2019, Print      !! diclofenac (VOLTAREN) 75 mg EC tablet Take 1 tablet (75 mg total) by mouth 2 (two) times a day, Starting Mon 12/30/2019, Until Wed 1/29/2020, Normal      !! diclofenac (VOLTAREN) 75 mg EC tablet Take 1 tablet (75 mg total) by mouth 2 (two) times a day, Starting Mon 12/30/2019, Until Wed 1/29/2020, Normal      methocarbamol (ROBAXIN) 750 mg tablet Take 1 tablet (750 mg total) by mouth every 6 (six) hours as needed for muscle spasms for up to 14 days, Starting Thu 8/15/2019, Until Thu 8/29/2019, Normal      predniSONE 10 mg tablet Five tablets for 2 days, 4 tablets for 3 days, 3 tablets for 3 days, 2 tablets for 2 days, 1 tablet for 1 day , Normal      traMADol (ULTRAM) 50 mg tablet Take 1 tablet (50 mg total) by mouth every 6 (six) hours as needed for moderate pain, Starting Fri 3/15/2019, Normal       !! - Potential duplicate medications found  Please discuss with provider  No discharge procedures on file      ED Provider  Electronically Signed by           Sharon Zhu PA-C  01/01/20 7319

## 2020-01-01 NOTE — CONSULTS
Plastic/Hand Surgery Consult - Boo Dixon 37 y o  male MRN: [id number]    Unit/Bed#: QCF Encounter: [id number]      Assessment:  Impression: 1) Left hand cellulitis   I discussed with the patient that there is no evidence of abscess or flexor tendon inflammation that would require internvetion at this time  Given no history of puncture wound and long history of arthritis will recommend conservative management at the moment with IV antibiotics in ED and transition to oral antibiotics upon discharge  Patient was encouraged to use hand and wash daily  Plan:  GI: regular diet  Wound Care: Keep  left upper extremity elevated above level of heart  ID: one dose of IV Ancef in ED and discharge on 7-10 day course of keflex  Ambulation/ Mobility Protocol: No restrictions  Patient should follow up with plastic surgery in 48 hours for hand re-evaluation  If any signs of fever, chills, worsening redness, patient was advised to return to ED for revaluation  Bakari Moreno 5   [facility], [address]    Office: [phone]        HPI:  This is a 37 y o  right handed male who presents with progressive swelling and pain on his left hand  He manifest that has long standing history of arthritis due to his [employer] career with constant trauma to his knuckles with pushups  He usually takes diclofenac when he gets a flair episode of pain but this time it dit not improved and some redness back of hand  He denies fever, chills or recent trauma to the hand  He works in desk work with repetitive finger motion  Consults    Historical Information   Past Medical History:   Diagnosis Date    Arthritis      History reviewed  No pertinent surgical history    Social History   Social History     Substance and Sexual Activity   Alcohol Use No     Social History     Substance and Sexual Activity   Drug Use Never     Social History     Tobacco Use   Smoking Status Never Smoker   Smokeless Tobacco Never Used     Family History: non-contributory    Meds/Allergies   all current active meds have been reviewed  Allergies   Allergen Reactions    Gabapentin        Vitals: Blood pressure 145/97, pulse 86, temperature 98 2 °F (36 8 °C), temperature source Oral, resp  rate 14, SpO2 95 %  Results: No results found for this or any previous visit (from the past 24 hour(s))  @RESULTRCNTPROC[24H@    Exam:     Skin: erythema localized to left 2n-3rd MCP joint area and dorsum of hand  There are some thick calluses on all his knuckles as well with dry skin  There is no localized tenderness along all digit flexor sheaths  Mild tenderness left 2nd web space but no palpable fluctuance or wound noticed  Musculoskeletal: wrist  left and right  full ROM and 5/5 strength in flexion, extension, radioulna deviation on right hand and on left hand there is decrease flex/extension ROM mostly due to edema  fingers  left and right  full ROM and 5/5 strength flexion, extension, abduction, adduction, opposition of all fingers right hand  and left hand decrease along index/middle ROM      Vascular: radial artery Left  2+ (normal) / Right  2+ (normal)     Neurologic: normal sensation in distribution of radial, median, ulnar nerves  bilateral    Code Status: No Order  Advance Directive and Living Will:      Power of :    POLST:      Counseling / Coordination of Care  Total floor / unit time spent today 30 minutes  Greater than 50% of total time was spent with the patient and / or family counseling and / or coordination of care   A description of the counseling / coordination of care: evaluation and coordination of care

## 2020-01-07 DIAGNOSIS — M54.30 SCIATIC LEG PAIN: ICD-10-CM

## 2020-01-07 DIAGNOSIS — M54.42 ACUTE MIDLINE LOW BACK PAIN WITH LEFT-SIDED SCIATICA: ICD-10-CM

## 2020-01-08 RX ORDER — PREDNISONE 10 MG/1
TABLET ORAL
Qty: 36 TABLET | Refills: 0 | Status: SHIPPED | OUTPATIENT
Start: 2020-01-08 | End: 2020-01-22 | Stop reason: ALTCHOICE

## 2020-01-13 ENCOUNTER — HOSPITAL ENCOUNTER (OUTPATIENT)
Dept: RADIOLOGY | Facility: HOSPITAL | Age: 35
Discharge: HOME | End: 2020-01-13
Payer: COMMERCIAL

## 2020-01-13 ENCOUNTER — OFFICE VISIT (OUTPATIENT)
Dept: FAMILY MEDICINE CLINIC | Facility: CLINIC | Age: 35
End: 2020-01-13
Payer: COMMERCIAL

## 2020-01-13 VITALS — DIASTOLIC BLOOD PRESSURE: 98 MMHG | RESPIRATION RATE: 22 BRPM | SYSTOLIC BLOOD PRESSURE: 142 MMHG | HEART RATE: 74 BPM

## 2020-01-13 DIAGNOSIS — M54.30 SCIATIC LEG PAIN: ICD-10-CM

## 2020-01-13 DIAGNOSIS — S39.012A STRAIN OF BACK, INITIAL ENCOUNTER: ICD-10-CM

## 2020-01-13 DIAGNOSIS — M54.42 CHRONIC MIDLINE LOW BACK PAIN WITH LEFT-SIDED SCIATICA: ICD-10-CM

## 2020-01-13 DIAGNOSIS — G89.29 CHRONIC MIDLINE LOW BACK PAIN WITH LEFT-SIDED SCIATICA: ICD-10-CM

## 2020-01-13 DIAGNOSIS — M54.30 SCIATIC LEG PAIN: Primary | ICD-10-CM

## 2020-01-13 PROCEDURE — 36416 COLLJ CAPILLARY BLOOD SPEC: CPT

## 2020-01-13 PROCEDURE — 99213 OFFICE O/P EST LOW 20 MIN: CPT | Performed by: PHYSICIAN ASSISTANT

## 2020-01-13 PROCEDURE — 72148 MRI LUMBAR SPINE W/O DYE: CPT

## 2020-01-13 RX ORDER — TRAMADOL HYDROCHLORIDE 50 MG/1
50 TABLET ORAL EVERY 6 HOURS PRN
Qty: 20 TABLET | Refills: 0 | Status: SHIPPED | OUTPATIENT
Start: 2020-01-13 | End: 2020-01-22 | Stop reason: ALTCHOICE

## 2020-01-13 RX ORDER — METHOCARBAMOL 750 MG/1
750 TABLET, FILM COATED ORAL EVERY 6 HOURS PRN
Qty: 30 TABLET | Refills: 1 | Status: SHIPPED | OUTPATIENT
Start: 2020-01-13 | End: 2020-01-22 | Stop reason: ALTCHOICE

## 2020-01-13 RX ORDER — KETOROLAC TROMETHAMINE 30 MG/ML
60 INJECTION, SOLUTION INTRAMUSCULAR; INTRAVENOUS ONCE
Status: COMPLETED | OUTPATIENT
Start: 2020-01-13 | End: 2020-01-13

## 2020-01-13 RX ADMIN — KETOROLAC TROMETHAMINE 60 MG: 30 INJECTION, SOLUTION INTRAMUSCULAR; INTRAVENOUS at 15:10

## 2020-01-13 NOTE — PROGRESS NOTES
Assessment and Plan:  Patient Instructions     1  Intractable low back pain with left-sided sciatic features -recurrent and chronic - patient is currently extremely limited in ability to move her be examined  He is having difficulty ambulating  He will be given a shot of 60 mg IM Toradol in the office today  He will finish current prednisone taper and continue diclofenac twice daily  He will also be given Robaxin as needed for muscle spasm or tightness and tramadol 50 mg q 6 hours p r n  Severe pain  Appropriate use of medication and short-term use were discussed with patient  He will be referred back to pain management which he last saw about 5 months ago  MRI of the lower spine will be ordered  School and work note will be given for this week  Diagnoses and all orders for this visit:    Sciatic leg pain  -     MRI lumbar spine wo contrast; Future  -     Ambulatory referral to Pain Management; Future  -     methocarbamol (ROBAXIN) 750 mg tablet; Take 1 tablet (750 mg total) by mouth every 6 (six) hours as needed for muscle spasms for up to 14 days  -     traMADol (ULTRAM) 50 mg tablet; Take 1 tablet (50 mg total) by mouth every 6 (six) hours as needed for moderate pain  -     ketorolac (TORADOL) 60 mg/2 mL IM injection 60 mg    Chronic midline low back pain with left-sided sciatica  -     MRI lumbar spine wo contrast; Future  -     Ambulatory referral to Pain Management; Future  -     ketorolac (TORADOL) 60 mg/2 mL IM injection 60 mg    Strain of back, initial encounter  -     methocarbamol (ROBAXIN) 750 mg tablet; Take 1 tablet (750 mg total) by mouth every 6 (six) hours as needed for muscle spasms for up to 14 days              Subjective:      Patient ID: Manny Petty is a 37 y o  male      CC:    Chief Complaint   Patient presents with    Back Pain     this has been going on for a yr now    Leg Pain     left leg pain, this has been going on for a yr now       HPI:     HPI: This is a 61-year-old gentleman that presents to the office with concerns over severe intractable back pain  This started about 10 days ago  Patient was initially seen in the emergency room setting  After symptoms did not seem to improve he started on a course of prednisone but is still having extreme pain  He is very limited in his ability to ambulate and stand for prolonged period of time  He is having difficulty with sitting and feels most comfortable when lying prone on his abdomen  He is having pain shooting down his left thigh and lower extremity  He has previously seen pain management for this and MRI was ordered however he was unable to get a completed at the time because of cost  Patient denies loss of bowel or bladder function  The following portions of the patient's history were reviewed and updated as appropriate: allergies, current medications, past family history, past medical history, past social history, past surgical history and problem list       Review of Systems   Constitutional: Negative for fever  HENT: Negative for congestion  Respiratory: Negative for cough, chest tightness and shortness of breath  Cardiovascular: Negative for chest pain  Musculoskeletal: Positive for arthralgias, back pain, gait problem and myalgias  Data to review:       Objective:    Vitals:    01/13/20 1442   BP: 142/98   BP Location: Right arm   Patient Position: Supine   Cuff Size: Adult   Pulse: 74   Resp: 22        Physical Exam   Constitutional: He is oriented to person, place, and time  He appears well-developed and well-nourished  He appears distressed  Patient lying prone on the exam table, obviously in discomfort  HENT:   Head: Normocephalic  Cardiovascular: Normal rate and regular rhythm  Pulmonary/Chest: Effort normal and breath sounds normal  No respiratory distress  Abdominal: Soft  Musculoskeletal: He exhibits tenderness  He exhibits no edema or deformity       Exam very limited because of extreme pain  Patient having difficulty changing positions and standing for just a few minutes  Range of motion extremely  limited in all ranges  Diminished left foot dorsiflexion compared to right  Neurological: He is alert and oriented to person, place, and time  No sensory deficit  Psychiatric: He has a normal mood and affect

## 2020-01-13 NOTE — PATIENT INSTRUCTIONS
1   Intractable low back pain with left-sided sciatic features -recurrent and chronic - patient is currently extremely limited in ability to move her be examined  He is having difficulty ambulating  He will be given a shot of 60 mg IM Toradol in the office today  He will finish current prednisone taper and continue diclofenac twice daily  He will also be given Robaxin as needed for muscle spasm or tightness and tramadol 50 mg q 6 hours p r n  Severe pain  Appropriate use of medication and short-term use were discussed with patient  He will be referred back to pain management which he last saw about 5 months ago  MRI of the lower spine will be ordered  School and work note will be given for this week

## 2020-01-14 ENCOUNTER — TELEPHONE (OUTPATIENT)
Dept: FAMILY MEDICINE CLINIC | Facility: CLINIC | Age: 35
End: 2020-01-14

## 2020-01-14 DIAGNOSIS — M54.17 LUMBOSACRAL RADICULOPATHY AT L3: Primary | ICD-10-CM

## 2020-01-14 NOTE — TELEPHONE ENCOUNTER
MRI of the lower back shows multilevel disc degeneration and nerve impingement  I placed order for referral to pain management yesterday at our visit however I would also recommend he see neuro surgery  I will place an order for this

## 2020-01-14 NOTE — TELEPHONE ENCOUNTER
PT IS ASKING FOR A CALL BACK WITH MRI RESULTS, HE HAD IT DONE AT [facility], PT CAN BE REACHED [phone]

## 2020-01-20 ENCOUNTER — TELEPHONE (OUTPATIENT)
Dept: FAMILY MEDICINE CLINIC | Facility: CLINIC | Age: 35
End: 2020-01-20

## 2020-01-20 NOTE — TELEPHONE ENCOUNTER
Pt would like to know if he can get another note for work, states he still does not feel well  also wants to know if he has to be seen again in order to get the note? Please call pt at [phone]

## 2020-01-20 NOTE — TELEPHONE ENCOUNTER
You can please give him a note for the rest of this week  I do think he should schedule an appointment near the end of the week to re-evaluate his ability to return

## 2020-01-22 ENCOUNTER — OFFICE VISIT (OUTPATIENT)
Dept: FAMILY MEDICINE CLINIC | Facility: CLINIC | Age: 35
End: 2020-01-22
Payer: COMMERCIAL

## 2020-01-22 VITALS
WEIGHT: 311.8 LBS | HEART RATE: 100 BPM | SYSTOLIC BLOOD PRESSURE: 134 MMHG | HEIGHT: 75 IN | DIASTOLIC BLOOD PRESSURE: 88 MMHG | TEMPERATURE: 97.1 F | BODY MASS INDEX: 38.77 KG/M2

## 2020-01-22 DIAGNOSIS — M54.17 LUMBOSACRAL RADICULOPATHY AT L3: Primary | ICD-10-CM

## 2020-01-22 PROCEDURE — 99213 OFFICE O/P EST LOW 20 MIN: CPT | Performed by: PHYSICIAN ASSISTANT

## 2020-01-22 NOTE — PROGRESS NOTES
Assessment and Plan:  Patient Instructions   1  L3 radiculopathy-patient does have multiple levels of degenerative disc changes of his lower spine however his recent symptoms correlated mostly with his L3 radiculopathy  On his MRI a m  Most concerned with crowding of the cauda equina  Patient has evaluation with neuro surgery in 2 days  His current episode is improving however we are concerned more with the long-term and recurrent episodes that he has been having  At this point I will re-evaluate him next Friday for possible return to work on the 3rd of February  I did tell patient however this would be more up to the neuro surgeon if he felt that he needed to be out longer or pursue any surgical interventions  Diagnoses and all orders for this visit:    Lumbosacral radiculopathy at L3              Subjective:      Patient ID: Margi Bennett is a 37 y o  male  CC:    Chief Complaint   Patient presents with    Follow-up     Pt is here for a follow up on leg and back pain ,  pt states he has been feeling better and was able to stand and walk a full day yesterday   Pt states he stopped taking tramadol on saturday  HPI:    HPI:  This is a 49-year-old gentleman that presents to the office for follow-up of recent MRI results  He has been notified that the results were pretty significant and has been set up with neuro surgery for an opinion  He does state that his current episode seems to be getting somewhat better  He has been able to stop many of his medications including tramadol, muscle relaxer, and he completed his course of prednisone  He does continue diclofenac for inflammation twice daily  He has been ambulatory and able to sit in a chair without difficulty  He is still very slow to move in change position  He has had multiple episodes of recurrence of this back problem in the past and is interested in discussing the future    He has not had any bowel or bladder incontinence  The following portions of the patient's history were reviewed and updated as appropriate: allergies, current medications, past family history, past medical history, past social history, past surgical history and problem list       Review of Systems   Constitutional: Negative for fever  HENT: Negative for congestion  Respiratory: Negative for cough, chest tightness and shortness of breath  Cardiovascular: Negative for chest pain  Musculoskeletal: Positive for arthralgias, back pain and gait problem  Data to review:       Objective:    Vitals:    01/22/20 0925   BP: 134/88   BP Location: Left arm   Patient Position: Sitting   Cuff Size: Adult   Pulse: 100   Temp: (!) 97 1 °F (36 2 °C)   TempSrc: Tympanic   Weight: (!) 141 kg (311 lb 12 8 oz)   Height: 6' 3" (1 905 m)        Physical Exam   Constitutional: He is oriented to person, place, and time  He appears well-developed and well-nourished  HENT:   Head: Normocephalic  Cardiovascular: Normal rate and regular rhythm  Pulmonary/Chest: Effort normal and breath sounds normal  No respiratory distress  Abdominal: Soft  Musculoskeletal: Normal range of motion  Decreased range of motion with extension forward flexion of the lumbar spine  Neurological: He is alert and oriented to person, place, and time  Skin:   Monofilament sensation is intact bilateral lower extremity  Psychiatric: He has a normal mood and affect  BMI Counseling: Body mass index is 38 97 kg/m²  The BMI is above normal  Nutrition recommendations include decreasing portion sizes  Exercise recommendations include exercising 3-5 times per week

## 2020-01-22 NOTE — PATIENT INSTRUCTIONS
1  L3 radiculopathy-patient does have multiple levels of degenerative disc changes of his lower spine however his recent symptoms correlated mostly with his L3 radiculopathy  On his MRI a m  Most concerned with crowding of the cauda equina  Patient has evaluation with neuro surgery in 2 days  His current episode is improving however we are concerned more with the long-term and recurrent episodes that he has been having  At this point I will re-evaluate him next Friday for possible return to work on the 3rd of February  I did tell patient however this would be more up to the neuro surgeon if he felt that he needed to be out longer or pursue any surgical interventions

## 2020-01-24 ENCOUNTER — OFFICE VISIT (OUTPATIENT)
Dept: NEUROSURGERY | Facility: CLINIC | Age: 35
End: 2020-01-24
Payer: COMMERCIAL

## 2020-01-24 VITALS
BODY MASS INDEX: 38.67 KG/M2 | WEIGHT: 311 LBS | HEART RATE: 91 BPM | HEIGHT: 75 IN | DIASTOLIC BLOOD PRESSURE: 88 MMHG | RESPIRATION RATE: 16 BRPM | SYSTOLIC BLOOD PRESSURE: 140 MMHG | TEMPERATURE: 97.4 F

## 2020-01-24 DIAGNOSIS — M54.17 LUMBOSACRAL RADICULOPATHY AT L3: ICD-10-CM

## 2020-01-24 PROBLEM — M54.16 LUMBAR BACK PAIN WITH RADICULOPATHY AFFECTING LOWER EXTREMITY: Status: ACTIVE | Noted: 2020-01-24

## 2020-01-24 PROCEDURE — 99244 OFF/OP CNSLTJ NEW/EST MOD 40: CPT | Performed by: NURSE PRACTITIONER

## 2020-01-24 RX ORDER — TRAMADOL HYDROCHLORIDE 50 MG/1
50 TABLET ORAL EVERY 6 HOURS PRN
COMMUNITY
End: 2021-01-18 | Stop reason: ALTCHOICE

## 2020-01-24 RX ORDER — METHOCARBAMOL 500 MG/1
500 TABLET, FILM COATED ORAL 4 TIMES DAILY
COMMUNITY
End: 2021-01-18 | Stop reason: ALTCHOICE

## 2020-01-24 NOTE — ASSESSMENT & PLAN NOTE
· Patient presents as referral from PCP for history of lumbar back pain with radiculopathy who had recent MRI imaging to review  · Currently taking diclofenac for pain relief  · History of chronic pain to this area with recent exacerbation status post a fall on ice  · On exam with pain at left lumbar area that radiates along left lateral thigh to knee  Does not extend below knee  No weakness or numbness  Full motor strength on exam  No bbi  · Has referral to pain management  · Seen in ER for this on 1/1/2020 and discharged on prednisone but states this did not improve pain  · Imaging reviewed by myself and attending as follows:  · MRI lumbar spine 1/14/2020: Multilevel disc and facet degenerative change, significantly advanced for age  Multilevel nerve root encroachment in the subarticular zones, most prominent at L1-2 and L2-3, involving the left L2 and L3 nerve roots  Mild enlargement of the left L3 nerve root may represent inflammatory neuropathy versus small meningioma or schwannoma  Follow-up and post gadolinium imaging may be helpful  L5-S1 disc bulge resulting in chronic encroachment of the right S1 nerve root  · Discussed extensively role of conservative therapies in treatment of this recent exacerbation of what is likely an L3 radiculopathy  Discussed that patient should continue to exercise lightly especially with PT, and attempt DANIA and multimodal pain medications with pain management  · Additionally discussed that patient should utilize around the clock NSAIDs or Tylenol to prevent severe pain exacerbations  · Discussed with patient and wife that symptoms will likely improve further with conservative treatment but he should return for re-evaluation if symptoms have no resolved in about 3 months  · Patient and wife verbalized understanding of this and are in agreement with plan

## 2020-01-24 NOTE — PROGRESS NOTES
Neurosurgery Office Note  Jesusita Barrera 37 y o  male MRN: [id number]      Assessment/Plan     Lumbar back pain with radiculopathy affecting lower extremity  · Patient presents as referral from PCP for history of lumbar back pain with radiculopathy who had recent MRI imaging to review  · Currently taking diclofenac for pain relief  · History of chronic pain to this area with recent exacerbation status post a fall on ice  · On exam with pain at left lumbar area that radiates along left lateral thigh to knee  Does not extend below knee  No weakness or numbness  Full motor strength on exam  No bbi  · Has referral to pain management  · Seen in ER for this on 1/1/2020 and discharged on prednisone but states this did not improve pain  · Imaging reviewed by myself and attending as follows:  · MRI lumbar spine 1/14/2020: Multilevel disc and facet degenerative change, significantly advanced for age  Multilevel nerve root encroachment in the subarticular zones, most prominent at L1-2 and L2-3, involving the left L2 and L3 nerve roots  Mild enlargement of the left L3 nerve root may represent inflammatory neuropathy versus small meningioma or schwannoma  Follow-up and post gadolinium imaging may be helpful  L5-S1 disc bulge resulting in chronic encroachment of the right S1 nerve root  · Discussed extensively role of conservative therapies in treatment of this recent exacerbation of what is likely an L3 radiculopathy  Discussed that patient should continue to exercise lightly especially with PT, and attempt DANIA and multimodal pain medications with pain management  · Additionally discussed that patient should utilize around the clock NSAIDs or Tylenol to prevent severe pain exacerbations  · Discussed with patient and wife that symptoms will likely improve further with conservative treatment but he should return for re-evaluation if symptoms have no resolved in about 3 months    · Patient and wife verbalized understanding of this and are in agreement with plan  Diagnoses and all orders for this visit:    Lumbosacral radiculopathy at L3  -     Ambulatory referral to Neurosurgery  -     Ambulatory referral to Physical Therapy; Future  -     Ambulatory referral to Pain Management; Future    Other orders  -     traMADol (ULTRAM) 50 mg tablet; Take 50 mg by mouth every 6 (six) hours as needed for moderate pain  -     methocarbamol (ROBAXIN) 500 mg tablet; Take 500 mg by mouth 4 (four) times a day PRN            CHIEF COMPLAINT    Chief Complaint   Patient presents with    Consult       HISTORY    History of Present Illness     37y o  year old male that was well managed up until he slipped on some ice on the first of January of this year  He states at that point his left low back pain became severe and radiated down his left thigh to his knee  This prompted him to seek care in the emergency room on 1/1/2020  He was prescribed a steroid taper which did not affect his symptoms  He was unable to get out of bed for a few days, but slowly began to improve  He was unable to sit comfortably without pain until that last few days  He saw his PCP in the interim and was prescribed tramadol and robaxin which he said provided minimal relief  His PCP then ordered a lumbar MRI  He presents today stating his symptoms have improved and he's only taking diclofenac for pain  He is able to ambulate without difficulty  He denies any numbness or weakness  He denies any bowel or bladder issues  HPI    See Discussion    REVIEW OF SYSTEMS    Review of Systems   Constitutional: Negative  HENT: Negative  Eyes: Negative  Respiratory: Negative  Cardiovascular: Negative  Gastrointestinal: Positive for nausea (with pain)  Endocrine: Negative  Genitourinary: Negative      Musculoskeletal: Positive for back pain (left LBP pain into buttock down outer part of thigh at times inner thigh to the knee  ) and gait problem (left leg pain  )  Allergic/Immunologic: Negative  Neurological: Positive for weakness (left leg), light-headedness (when pain is intense) and headaches (HA)  Hematological: Negative  Meds/Allergies     Current Outpatient Medications   Medication Sig Dispense Refill    diclofenac (VOLTAREN) 75 mg EC tablet Take 1 tablet (75 mg total) by mouth 2 (two) times a day 60 tablet 1    methocarbamol (ROBAXIN) 500 mg tablet Take 500 mg by mouth 4 (four) times a day PRN      traMADol (ULTRAM) 50 mg tablet Take 50 mg by mouth every 6 (six) hours as needed for moderate pain       No current facility-administered medications for this visit  Allergies   Allergen Reactions    Gabapentin        PAST HISTORY    Past Medical History:   Diagnosis Date    Arthritis        No past surgical history on file  Social History     Tobacco Use    Smoking status: Never Smoker    Smokeless tobacco: Never Used   Substance Use Topics    Alcohol use: No    Drug use: Never       Family History   Problem Relation Age of Onset    Hypertension Mother     No Known Problems Father          Above history personally reviewed  EXAM    Vitals:Blood pressure 140/88, pulse 91, temperature (!) 97 4 °F (36 3 °C), temperature source Tympanic, resp  rate 16, height 6' 3" (1 905 m), weight (!) 141 kg (311 lb)  ,Body mass index is 38 87 kg/m²  Physical Exam   Constitutional: He is oriented to person, place, and time  He appears well-developed and well-nourished  No distress  Eyes: Pupils are equal, round, and reactive to light  Conjunctivae and EOM are normal  Right eye exhibits no discharge  Left eye exhibits no discharge  Neck: Normal range of motion  Neck supple  Cardiovascular: Normal rate and regular rhythm  Pulmonary/Chest: Effort normal and breath sounds normal  No respiratory distress  Abdominal: Soft  Bowel sounds are normal  He exhibits no distension  There is no tenderness     Musculoskeletal: Normal range of motion  Neurological: He is alert and oriented to person, place, and time  He displays normal reflexes  No cranial nerve deficit or sensory deficit  He exhibits normal muscle tone  He has a normal Finger-Nose-Finger Test  Gait normal  Coordination normal    Reflex Scores:       Tricep reflexes are 2+ on the right side and 2+ on the left side  Bicep reflexes are 2+ on the right side and 2+ on the left side  Brachioradialis reflexes are 2+ on the right side and 2+ on the left side  Patellar reflexes are 2+ on the right side and 2+ on the left side  Achilles reflexes are 2+ on the right side and 2+ on the left side  Skin: Skin is warm and dry  He is not diaphoretic  Psychiatric: He has a normal mood and affect  His speech is normal and behavior is normal  Judgment and thought content normal        Neurologic Exam     Mental Status   Oriented to person, place, and time  Oriented to person  Oriented to place  Oriented to time  Oriented to year, month and date  Registration: recalls 3 of 3 objects  Attention: normal  Concentration: normal    Speech: speech is normal   Level of consciousness: alert  Knowledge: good and consistent with education  Able to name object  Cranial Nerves   Cranial nerves II through XII intact  CN III, IV, VI   Pupils are equal, round, and reactive to light  Extraocular motions are normal    Right pupil: Size: 3 mm  Shape: regular  Reactivity: brisk  Consensual response: intact  Accommodation: intact  Left pupil: Size: 3 mm  Shape: regular  Reactivity: brisk  Consensual response: intact  Accommodation: intact  Nystagmus: none   Diplopia: none  Conjugate gaze: present    CN V   Right facial sensation deficit: none  Left facial sensation deficit: none    CN VII   Facial expression full, symmetric       CN VIII   Hearing: intact    CN IX, X   Palate: symmetric    CN XI   Right sternocleidomastoid strength: normal  Left sternocleidomastoid strength: normal  Right trapezius strength: normal  Left trapezius strength: normal    CN XII   Tongue: not atrophic  Fasciculations: absent  Tongue deviation: none    Motor Exam   Muscle bulk: normal  Overall muscle tone: normal  Right arm pronator drift: absent  Left arm pronator drift: absent    Strength   Right deltoid: 5/5  Left deltoid: 5/5  Right biceps: 5/5  Left biceps: 5/5  Right triceps: 5/5  Left triceps: 5/5  Right quadriceps: 5/5  Left quadriceps: 5/5  Right hamstrin/5  Left hamstrin/5  Right anterior tibial: 5/5  Left anterior tibial: 5/5  Right posterior tibial: 5/5  Left posterior tibial: 5/5  Right peroneal: 5/5  Left peroneal: 5/5  Right gastroc: 5/5  Left gastroc: 5/5    Sensory Exam   Light touch normal    Proprioception normal    Pain radiates along L3 distribution  Gait, Coordination, and Reflexes     Gait  Gait: normal    Coordination   Finger to nose coordination: normal    Tremor   Resting tremor: absent  Intention tremor: absent  Action tremor: absent    Reflexes   Right brachioradialis: 2+  Left brachioradialis: 2+  Right biceps: 2+  Left biceps: 2+  Right triceps: 2+  Left triceps: 2+  Right patellar: 2+  Left patellar: 2+  Right achilles: 2+  Left achilles: 2+  Right : 2+  Left : 2+  Right Love: absent  Left Love: absent  Right ankle clonus: absent  Left ankle clonus: absent        MEDICAL DECISION MAKING    Imaging Studies:     Xr Wrist 3+ Views Left    Result Date: 2020  Narrative: LEFT WRIST INDICATION:   pain, swelling  COMPARISON:  None VIEWS:  XR WRIST 3+ VW LEFT FINDINGS: There is no acute fracture or dislocation  There is some narrowing of the radiocarpal joint  Small round lucent areas within the lunate and proximal pole of scaphoid and triquetrum have benign appearance and may represent some degenerative cysts  No gross evidence of periarticular erosive changes or osteophytosis  No destructive bone lesions are seen   There is swelling of the back of the hand      Impression: Benign-appearing lucencies in the proximal carpal row may be degenerative cysts  No acute abnormality seen Workstation performed: [id number]     Xr Hand 3+ Views Left    Result Date: 1/1/2020  Narrative: LEFT HAND INDICATION:   pain, swelling  COMPARISON:  None VIEWS:  XR HAND 3+ VW LEFT For the purposes of institution wide universal language the following terms will apply: (thumb=1st digit/finger, index finger=2nd digit/finger, long finger=3rd digit/finger, ring=4th digit/finger and small finger=5th digit/finger) FINDINGS: There is no acute fracture or dislocation  No significant degenerative changes  There appear to be some small cystic lucencies within the scaphoid and lunate and triquetrum  These might be degenerative cysts  There is some narrowing of the radiocarpal joint  There is swelling of the hand  Impression: Swelling of the hand  No acute findings  Possible small degenerative cysts in the bones of the wrist  Workstation performed: [id number]     Mri Lumbar Spine Wo Contrast    Result Date: 1/13/2020  Narrative: MRI LUMBAR SPINE WITHOUT CONTRAST INDICATION: Intractable low back pain with left sciatica  COMPARISON:  5/13/2019  TECHNIQUE:  Sagittal T1, sagittal T2, sagittal inversion recovery, axial T1 and axial T2, coronal T2   IMAGE QUALITY:  Diagnostic FINDINGS: VERTEBRAL BODIES:  There are 5 lumbar type vertebral bodies  Normal alignment of the lumbar spine  No evidence of osseous lesion or marrow edema  SACRUM:  Unremarkable DISTAL CORD AND CONUS:  Normal signal morphology of the distal thoracic cord and conus, terminating at T12  PARASPINAL SOFT TISSUES:  No mass or fluid collection  Subcentimeter left renal cysts  LOWER THORACIC DISC SPACES:  T11-12 mild posterior disc bulge and paracentral protrusion  No significant central canal stenosis or neural foraminal narrowing  LUMBAR DISC SPACES: L1-L2:  Posterior and lateral disc bulge    Superimposed paracentral to left foraminal zone disc protrusion  Ligamentum flavum and facet hypertrophy  Moderate central canal stenosis  Crowding of the cauda equina and encroachment of the traversing left L2 nerve root in the subarticular zone  Mild left neural foraminal narrowing  L2-L3:  Posterior and lateral disc bulge  Ligamentum flavum flavum and facet hypertrophy  Moderate central canal stenosis and crowding of the cauda equina  Encroachment of the traversing bilateral L3 nerve roots in the subarticular zones  Enlargement  of the left L3 nerve root in the subarticular zone measuring up to 5 mm in diameter, possibly representing inflammation; however small meningioma or schwannoma cannot be excluded  Mild bilateral neural foraminal narrowing  L3-L4:  Posterior disc bulge and facet arthropathy  Mild central canal stenosis  Mild encroachment of the traversing L4 nerve roots in the subarticular zones  Mild bilateral neural foraminal narrowing  L4-L5:  Posterior disc osteophytes and disc bulge  Facet osteophytosis  Mild central canal stenosis  Mild encroachment of the traversing L5 nerve roots in the subarticular zones  Mild to moderate bilateral neural foraminal narrowing  L5-S1:  Posterior disc bulge and superimposed paracentral to right foraminal zone disc protrusion and annular tear  Mild central canal stenosis  Mass effect on the traversing right S1 nerve root in the subarticular zone  Mild to moderate bilateral neural foraminal narrowing  Impression: 1  Multilevel disc and facet degenerative change, significantly advanced for age  2   Multilevel nerve root encroachment in the subarticular zones, most prominent at L1-2 and L2-3, involving the left L2 and L3 nerve roots  Mild enlargement of the left L3 nerve root may represent inflammatory neuropathy versus small meningioma or schwannoma  Follow-up and post gadolinium imaging may be helpful  3    L5-S1 disc bulge resulting in chronic encroachment of the right S1 nerve root  Workstation performed: [id number]       I have personally reviewed pertinent reports     and I have personally reviewed pertinent films in PACS

## 2020-01-24 NOTE — LETTER
January 24, 2020     MEGHNA Simon [facility]  [address] [employer]    Patient: Luis M Brewer   YOB: 1985   Date of Visit: 1/24/2020       Dear Dr Tara Feliciano: Thank you for referring Luis M Brewer to me for evaluation  Below are my notes for this consultation  If you have questions, please do not hesitate to call me  I look forward to following your patient along with you           Sincerely,        Delgado Tse MD        CC: Ines Tolentino, DO

## 2020-01-31 ENCOUNTER — OFFICE VISIT (OUTPATIENT)
Dept: FAMILY MEDICINE CLINIC | Facility: CLINIC | Age: 35
End: 2020-01-31
Payer: COMMERCIAL

## 2020-01-31 VITALS
HEIGHT: 75 IN | HEART RATE: 83 BPM | TEMPERATURE: 98.2 F | BODY MASS INDEX: 39.17 KG/M2 | DIASTOLIC BLOOD PRESSURE: 98 MMHG | WEIGHT: 315 LBS | SYSTOLIC BLOOD PRESSURE: 128 MMHG

## 2020-01-31 DIAGNOSIS — R03.0 ELEVATED BLOOD PRESSURE READING: ICD-10-CM

## 2020-01-31 DIAGNOSIS — M54.30 SCIATIC LEG PAIN: ICD-10-CM

## 2020-01-31 DIAGNOSIS — M54.16 LUMBAR BACK PAIN WITH RADICULOPATHY AFFECTING LOWER EXTREMITY: Primary | ICD-10-CM

## 2020-01-31 DIAGNOSIS — E66.9 CLASS 2 OBESITY WITHOUT SERIOUS COMORBIDITY WITH BODY MASS INDEX (BMI) OF 39.0 TO 39.9 IN ADULT, UNSPECIFIED OBESITY TYPE: ICD-10-CM

## 2020-01-31 PROCEDURE — 3008F BODY MASS INDEX DOCD: CPT | Performed by: PHYSICIAN ASSISTANT

## 2020-01-31 PROCEDURE — 99214 OFFICE O/P EST MOD 30 MIN: CPT | Performed by: PHYSICIAN ASSISTANT

## 2020-01-31 NOTE — PROGRESS NOTES
Assessment and Plan:  Patient Instructions     1  Chronic recurrent low back pain with radiculopathy - patient did have MRI which shows significant multilevel disc changes  His symptoms seem to be most active at L3 and S1 nerve roots  He has been evaluated by neuro surgery and will exhaust conservative measures 1st   He does have appointment with pain management to consider injection therapies on the 10th of February  In the meantime he is setting up physical therapy evaluation  He will continue diclofenac for inflammation twice daily with food  Does still have muscle relaxer and tramadol which she uses extremely sparingly if necessary for more severe pain  He has been tolerating going to classes and sitting for periods of time  He is interested in returning to work on Monday and he is mostly sedentary and I feel this would be reasonable  I will give him a note to go back on Monday, February 3rd, 2020   2   Elevated blood pressure reading-patient has had a few blood pressure readings which have been borderline to slightly high  Today his diastolic was 98  Some of these readings may be in response to pain  Recommend home monitoring and having closer follow-up after he has had appropriate pain management treatment  3   Obesity -continue with diet and resume exercise once physical therapy shows appropriate exercises given his back pain        Problem List Items Addressed This Visit        Nervous and Auditory    Sciatic leg pain    Lumbar back pain with radiculopathy affecting lower extremity - Primary       Other    Elevated blood pressure reading      Other Visit Diagnoses     Class 2 obesity without serious comorbidity with body mass index (BMI) of 39 0 to 39 9 in adult, unspecified obesity type                     Diagnoses and all orders for this visit:    Lumbar back pain with radiculopathy affecting lower extremity    Sciatic leg pain    Elevated blood pressure reading    Class 2 obesity without serious comorbidity with body mass index (BMI) of 39 0 to 39 9 in adult, unspecified obesity type              Subjective:      Patient ID: Clair Mo is a 37 y o  male  CC:    Chief Complaint   Patient presents with    Follow-up     patient in office for follow up; he would like to discuss FMLA        HPI:     HPI:  This is a 42-year-old gentleman that presents to the office for follow-up of chronic recurrent low back pain with radiculopathy  He did have significant L3 symptoms and encroachment on cauda equina and had consultation with Neurosurgery  It was felt that he best exhaust conservative management with physical therapy and pain management 1st   He does have 1st appointment with pain management on the 10th of February  He is also in the process of setting up physical therapy evaluation  When he was initially seen for this flare up he was having difficulty ambulating altogether and was in intractable pain  He has improved greatly but is still having some difficulty with ambulating any distance is or standing for prolonged period of time  He does seem most comfortable sitting and has been able to go to school and attend classes  He is interested in trying to return to work on Monday  He is mostly sedentary but does occasionally need to get up and walk to check on coworkers  The following portions of the patient's history were reviewed and updated as appropriate: allergies, current medications, past family history, past medical history, past social history, past surgical history and problem list       Review of Systems   Constitutional: Negative for chills, fatigue and fever  HENT: Negative for congestion, ear pain and sinus pressure  Eyes: Negative for visual disturbance  Respiratory: Negative for cough, chest tightness and shortness of breath  Cardiovascular: Negative for chest pain and palpitations  Gastrointestinal: Negative for diarrhea, nausea and vomiting     Endocrine: Negative for polyuria  Genitourinary: Negative for dysuria and frequency  Musculoskeletal: Positive for arthralgias, back pain and myalgias  Skin: Negative for pallor and rash  Neurological: Negative for dizziness, weakness, light-headedness, numbness and headaches  Psychiatric/Behavioral: Negative for agitation, behavioral problems and sleep disturbance  All other systems reviewed and are negative  Data to review:       Objective:    Vitals:    01/31/20 0920   BP: 128/98   BP Location: Left arm   Patient Position: Sitting   Cuff Size: Large   Pulse: 83   Temp: 98 2 °F (36 8 °C)   TempSrc: Tympanic   Weight: (!) 145 kg (318 lb 9 6 oz)   Height: 6' 3" (1 905 m)        Physical Exam   Constitutional: He is oriented to person, place, and time  He appears well-developed and well-nourished  No distress  HENT:   Head: Normocephalic and atraumatic  Right Ear: External ear normal    Left Ear: External ear normal    Nose: Nose normal    Mouth/Throat: Oropharynx is clear and moist  No oropharyngeal exudate  Eyes: Pupils are equal, round, and reactive to light  Conjunctivae and EOM are normal    Neck: Normal range of motion  Neck supple  No tracheal deviation present  No thyromegaly present  Cardiovascular: Normal rate, regular rhythm and normal heart sounds  Exam reveals no friction rub  No murmur heard  Pulmonary/Chest: Effort normal and breath sounds normal  No respiratory distress  He has no wheezes  He has no rales  Abdominal: Soft  Bowel sounds are normal  He exhibits no distension  There is no tenderness  There is no rebound and no guarding  Musculoskeletal: Normal range of motion  He exhibits no edema or tenderness  Lymphadenopathy:     He has no cervical adenopathy  Neurological: He is alert and oriented to person, place, and time  No cranial nerve deficit  Coordination normal    Skin: Skin is warm and dry  No rash noted  No erythema     Psychiatric: He has a normal mood and affect  His behavior is normal  Thought content normal    Nursing note and vitals reviewed

## 2020-01-31 NOTE — LETTER
January 31, 2020     Patient: Boo Dixon   YOB: 1985   Date of Visit: 1/31/2020       To Whom it May Concern:    Boo Dixon is under my professional care  He was seen in my office on 1/31/2020  He may return to work on 02/03/2020 without restrictions  If you have any questions or concerns, please don't hesitate to call           Sincerely,          Saundra Fonseca PA-C        CC: No Recipients

## 2020-02-05 ENCOUNTER — TELEPHONE (OUTPATIENT)
Dept: OTHER | Facility: OTHER | Age: 35
End: 2020-02-05

## 2020-02-05 NOTE — TELEPHONE ENCOUNTER
I called patient back and he wanted to know if M S  Was able to complete his FMLA paperwork  Per patient the FMLA needs to be intermittent until May  Patient also stated he needs a restriction letter due to his of inability to walk since he is not able to walk for a long period of time      Please review and advise

## 2020-02-05 NOTE — TELEPHONE ENCOUNTER
I have already sent in paperwork yesterday with no restrictions as discussed at our visit  If I need to now add restrictions I will need a new form to complete from his employer

## 2020-02-05 NOTE — TELEPHONE ENCOUNTER
Please call patient regarding FMLA paperwork as well as a doctor's note with restrictions for employer

## 2020-02-06 ENCOUNTER — TELEPHONE (OUTPATIENT)
Dept: FAMILY MEDICINE CLINIC | Facility: CLINIC | Age: 35
End: 2020-02-06

## 2020-02-06 DIAGNOSIS — M25.539 CHRONIC WRIST PAIN, UNSPECIFIED LATERALITY: ICD-10-CM

## 2020-02-06 DIAGNOSIS — G89.29 CHRONIC WRIST PAIN, UNSPECIFIED LATERALITY: ICD-10-CM

## 2020-02-06 RX ORDER — DICLOFENAC SODIUM 75 MG/1
75 TABLET, DELAYED RELEASE ORAL 2 TIMES DAILY
Qty: 60 TABLET | Refills: 0 | Status: SHIPPED | OUTPATIENT
Start: 2020-02-06 | End: 2020-04-30

## 2020-02-06 NOTE — LETTER
February 7, 2020     Patient: Tata Martinez   YOB: 1985   Date of Visit: 2/4/2020       To Whom it May Concern:    Tata Martinez is under my professional care  He was seen in my office on 2/6/2020  He may return to work on 2/7/2020  If you have any questions or concerns, please don't hesitate to call           Sincerely,          Dora Macias PA-C

## 2020-02-06 NOTE — TELEPHONE ENCOUNTER
Patient was wondering if his FMLA papers are  Finished  Also he wanted to know if [employer] would give him a work excuse for Feb 4 - Feb 7  For his back pain  He has an appointment with neurosurgery on Monday

## 2020-02-06 NOTE — LETTER
February 7, 2020     Patient: Beth Xie   YOB: 1985   Date of Visit: 2/4/2020       To Whom it May Concern:    Beth Xie is under my professional care  He was seen in my office on 2/4/2020  He may return to work on 2/7/2020  If you have any questions or concerns, please don't hesitate to call           Sincerely,          Ni Hernández PA-C

## 2020-02-07 NOTE — TELEPHONE ENCOUNTER
Pt notified  He is asking for a doctors note for this week  He said he will have his wife drop off paper work for [employer] paperwork to be filled out for intermittent until May  He also said his middle toe on his left foot is red, warm to touch, swollen  I advised him if it gets worse to go to the urgent care, he said his toe are 25% bigger than the other foot

## 2020-02-10 ENCOUNTER — CONSULT (OUTPATIENT)
Dept: PAIN MEDICINE | Facility: CLINIC | Age: 35
End: 2020-02-10
Payer: COMMERCIAL

## 2020-02-10 ENCOUNTER — HOSPITAL ENCOUNTER (OUTPATIENT)
Dept: RADIOLOGY | Facility: CLINIC | Age: 35
Discharge: HOME | End: 2020-02-10
Attending: ANESTHESIOLOGY | Admitting: ANESTHESIOLOGY
Payer: COMMERCIAL

## 2020-02-10 VITALS
SYSTOLIC BLOOD PRESSURE: 130 MMHG | HEART RATE: 88 BPM | TEMPERATURE: 97.9 F | RESPIRATION RATE: 20 BRPM | OXYGEN SATURATION: 98 % | DIASTOLIC BLOOD PRESSURE: 90 MMHG

## 2020-02-10 VITALS
HEIGHT: 75 IN | SYSTOLIC BLOOD PRESSURE: 134 MMHG | HEART RATE: 93 BPM | DIASTOLIC BLOOD PRESSURE: 88 MMHG | BODY MASS INDEX: 27.1 KG/M2 | WEIGHT: 218 LBS | TEMPERATURE: 97.7 F

## 2020-02-10 DIAGNOSIS — M54.16 LUMBAR RADICULOPATHY: ICD-10-CM

## 2020-02-10 DIAGNOSIS — M51.36 DDD (DEGENERATIVE DISC DISEASE), LUMBAR: ICD-10-CM

## 2020-02-10 DIAGNOSIS — M54.17 LUMBOSACRAL RADICULOPATHY AT L3: ICD-10-CM

## 2020-02-10 DIAGNOSIS — M54.16 LUMBAR RADICULOPATHY: Primary | ICD-10-CM

## 2020-02-10 DIAGNOSIS — M48.061 SPINAL STENOSIS OF LUMBAR REGION, UNSPECIFIED WHETHER NEUROGENIC CLAUDICATION PRESENT: ICD-10-CM

## 2020-02-10 DIAGNOSIS — M47.816 LUMBAR SPONDYLOSIS: ICD-10-CM

## 2020-02-10 PROBLEM — M51.369 DDD (DEGENERATIVE DISC DISEASE), LUMBAR: Status: ACTIVE | Noted: 2020-02-10

## 2020-02-10 PROCEDURE — 64484 NJX AA&/STRD TFRM EPI L/S EA: CPT | Performed by: ANESTHESIOLOGY

## 2020-02-10 PROCEDURE — 64483 NJX AA&/STRD TFRM EPI L/S 1: CPT | Performed by: ANESTHESIOLOGY

## 2020-02-10 PROCEDURE — 99244 OFF/OP CNSLTJ NEW/EST MOD 40: CPT | Performed by: ANESTHESIOLOGY

## 2020-02-10 RX ORDER — LIDOCAINE HYDROCHLORIDE 10 MG/ML
5 INJECTION, SOLUTION EPIDURAL; INFILTRATION; INTRACAUDAL; PERINEURAL ONCE
Status: COMPLETED | OUTPATIENT
Start: 2020-02-10 | End: 2020-02-10

## 2020-02-10 RX ORDER — PAPAVERINE HCL 150 MG
20 CAPSULE, EXTENDED RELEASE ORAL ONCE
Status: COMPLETED | OUTPATIENT
Start: 2020-02-10 | End: 2020-02-10

## 2020-02-10 RX ADMIN — LIDOCAINE HYDROCHLORIDE 2 ML: 20 INJECTION, SOLUTION EPIDURAL; INFILTRATION; INTRACAUDAL; PERINEURAL at 15:52

## 2020-02-10 RX ADMIN — IOHEXOL 4 ML: 300 INJECTION, SOLUTION INTRAVENOUS at 15:50

## 2020-02-10 RX ADMIN — DEXAMETHASONE SODIUM PHOSPHATE 15 MG: 10 INJECTION, SOLUTION INTRAMUSCULAR; INTRAVENOUS at 15:53

## 2020-02-10 RX ADMIN — LIDOCAINE HYDROCHLORIDE 4 ML: 10 INJECTION, SOLUTION EPIDURAL; INFILTRATION; INTRACAUDAL; PERINEURAL at 15:47

## 2020-02-10 NOTE — H&P
History of Present Illness: The patient is a 37 y o  male who presents with complaints of low back and left leg pain  Patient Active Problem List   Diagnosis    Elevated blood pressure reading    Strain of back, initial encounter    Sciatic leg pain    Acute midline low back pain with left-sided sciatica    Lumbar back pain with radiculopathy affecting lower extremity    Spinal stenosis of lumbar region    DDD (degenerative disc disease), lumbar    Lumbar spondylosis    Lumbar radiculopathy    Lumbosacral radiculopathy at L3       Past Medical History:   Diagnosis Date    Arthritis        History reviewed  No pertinent surgical history  Current Outpatient Medications:     diclofenac (VOLTAREN) 75 mg EC tablet, Take 1 tablet (75 mg total) by mouth 2 (two) times a day, Disp: 60 tablet, Rfl: 0    methocarbamol (ROBAXIN) 500 mg tablet, Take 500 mg by mouth 4 (four) times a day PRN, Disp: , Rfl:     traMADol (ULTRAM) 50 mg tablet, Take 50 mg by mouth every 6 (six) hours as needed for moderate pain, Disp: , Rfl:     Allergies   Allergen Reactions    Gabapentin        Physical Exam:   Vitals:    02/10/20 1532   BP: 122/83   Pulse: 88   Resp: 20   Temp: 97 9 °F (36 6 °C)   SpO2: 96%     General: Awake, Alert, Oriented x 3, Mood and affect appropriate  Respiratory: Respirations even and unlabored  Cardiovascular: Peripheral pulses intact; no edema  Musculoskeletal Exam:  Left lumbar paraspinals tender to palpation  ASA Score: 2    Patient/Chart Verification  Patient ID Verified: Verbal  ID Band Applied: No  Consents Confirmed: Procedural  H&P( within 30 days) Verified: To be obtained in the Pre-Procedure area  Interval H&P(within 24 hr) Complete (required for Outpatients and Surgery Admit only): To be obtained in the Pre-Procedure area  Allergies Reviewed: Yes  Anticoag/NSAID held?: No  Currently on antibiotics?: No    Assessment:   1   Lumbar radiculopathy        Plan: Left L1 and L2 TFESI

## 2020-02-10 NOTE — PROGRESS NOTES
Assessment  1  Lumbar radiculopathy    2  Lumbosacral radiculopathy at L3    3  Lumbar spondylosis    4  DDD (degenerative disc disease), lumbar    5  Spinal stenosis of lumbar region, unspecified whether neurogenic claudication present        Plan  23-year-old male referred by Dr Darrell Hidalgo, presenting for initial consultation regarding a greater than 1 year history of lumbosacral back pain with radiculopathy into the left lower extremity  The patient states initially the symptoms were in the L3 distribution of the left leg, however have now encompassed nearly every dermatome of the left leg to the knee  He did have a slip on ice at the beginning of January which cause his most recent flare  The patient has had similar symptoms in the past which have been resolved with a course of oral steroids, however this most recent flare was not alleviated with oral steroids  MRI of the lumbar spine reveals multilevel degenerative disc disease and spondylosis with varying degrees of central and foraminal stenosis most pronounced at L1-2, L2-3, and L5-S1  The patient has been evaluated by Neurosurgery who did not feel that surgical intervention was required at this time and has kindly refer the patient for further evaluation and treatment  He is currently taking tramadol 50 mg p r n , methocarbamol 500 mg q 6 hours p r n , and diclofenac 75 mg b i d  P r n  With mild relief  He was prescribed physical therapy, however has not started this yet  He was trialed lying gabapentin, however this caused significant side effects and has been discontinued  The patient's low back pain seems to be multifactorial including myofascial and facet mediated components  Radicular symptoms in the left leg are likely stemming from stenosis at L1-2 and L2-3     1  I will schedule the patient for left L1 and L2 TFESI to reduce the inflammatory component of his pain    2  The patient may continue with tramadol, methocarbamol, and diclofenac as prescribed  No changes were made to these medications  3  Will avoid Gabapentin noise secondary to side effects  4  Patient may proceed with physical therapy as I feel he would benefit from [facility] based exercises  5  I will follow up the patient in 4 weeks       Complete risks and benefits including bleeding, infection, tissue reaction, nerve injury and allergic reaction were discussed  The approach was demonstrated using models and literature was provided  Verbal and written consent was obtained  My impressions and treatment recommendations were discussed in detail with the patient who verbalized understanding and had no further questions  Discharge instructions were provided  I personally saw and examined the patient and I agree with the above discussed plan of care  No orders of the defined types were placed in this encounter  No orders of the defined types were placed in this encounter  History of Present Illness    Adrienne Carranza is a 37 y o  male referred by Dr Bard Hardin, presenting for initial consultation regarding a greater than 1 year history of lumbosacral back pain with radiation into the left lower extremity  The patient states initially the symptoms were in the medial aspect of the left leg to the knee, however have now encompassed nearly the entire left thigh to the knee  He denies any bladder or bowel incontinence or saddle anesthesia  He denies any numbness or paresthesias into the lower extremities  He denies any right lower extremity symptoms  He does have some weakness of the left lower extremity which she feels is secondary to the pain  He did have a slip on ice at the beginning of January which cause his most recent flare  The patient has had similar symptoms in the past which have been resolved with a course of oral steroids, however this most recent flare was not alleviated with oral steroids    MRI of the lumbar spine reveals multilevel degenerative disc disease and spondylosis with varying degrees of central and foraminal stenosis most pronounced at L1-2, L2-3, and L5-S1  The patient has been evaluated by Neurosurgery who did not feel that surgical intervention was required at this time and has kindly refer the patient for further evaluation and treatment  He is currently taking tramadol 50 mg p r n , methocarbamol 500 mg q 6 hours p r n , and diclofenac 75 mg b i d  P r n  With mild relief  He was prescribed physical therapy, however has not started this yet  He was trialed lying gabapentin, however this caused significant side effects and has been discontinued  The patient rates his pain a 10/10 on the pain is constant  The pain does not follow any particular pattern throughout the day  The pain is described as cramping, shooting, sharp, dull, and aching  The pain is increased with exercise  The pain is alleviated with relaxation  He has not found any relief with heat or ice application  I have personally reviewed and/or updated the patient's past medical history, past surgical history, family history, social history, current medications, allergies, and vital signs today  Other than as stated above, the patient denies any interval changes in medications, medical condition, mental condition, symptoms, or allergies since the last office visit  Review of Systems   Constitutional: Positive for unexpected weight change  Negative for fever  HENT: Negative for trouble swallowing  Eyes: Negative for visual disturbance  Respiratory: Negative for shortness of breath and wheezing  Cardiovascular: Negative for chest pain and palpitations  Gastrointestinal: Negative for constipation, diarrhea, nausea and vomiting  Endocrine: Negative for cold intolerance, heat intolerance and polydipsia  Genitourinary: Negative for difficulty urinating and frequency  Musculoskeletal: Positive for joint swelling and myalgias   Negative for arthralgias and gait problem  Skin: Negative for rash  Neurological: Negative for dizziness, seizures, syncope, weakness and headaches  Hematological: Does not bruise/bleed easily  Psychiatric/Behavioral: Negative for dysphoric mood  All other systems reviewed and are negative  Patient Active Problem List   Diagnosis    Elevated blood pressure reading    Strain of back, initial encounter    Sciatic leg pain    Acute midline low back pain with left-sided sciatica    Lumbar back pain with radiculopathy affecting lower extremity       Past Medical History:   Diagnosis Date    Arthritis        History reviewed  No pertinent surgical history  Family History   Problem Relation Age of Onset    Hypertension Mother     No Known Problems Father        Social History     Occupational History    Not on file   Tobacco Use    Smoking status: Never Smoker    Smokeless tobacco: Never Used   Substance and Sexual Activity    Alcohol use: No    Drug use: Never    Sexual activity: Yes       Current Outpatient Medications on File Prior to Visit   Medication Sig    diclofenac (VOLTAREN) 75 mg EC tablet Take 1 tablet (75 mg total) by mouth 2 (two) times a day    methocarbamol (ROBAXIN) 500 mg tablet Take 500 mg by mouth 4 (four) times a day PRN    traMADol (ULTRAM) 50 mg tablet Take 50 mg by mouth every 6 (six) hours as needed for moderate pain     No current facility-administered medications on file prior to visit  Allergies   Allergen Reactions    Gabapentin        Physical Exam    /88   Pulse 93   Temp 97 7 °F (36 5 °C) (Oral)   Ht 6' 3" (1 905 m)   Wt 98 9 kg (218 lb)   BMI 27 25 kg/m²     Constitutional: normal, well developed, well nourished, alert, in no distress and non-toxic and no overt pain behavior    Eyes: anicteric  HEENT: grossly intact  Neck: supple, symmetric, trachea midline and no masses   Pulmonary:even and unlabored  Cardiovascular:No edema or pitting edema present  Skin:Normal without rashes or lesions and well hydrated  Psychiatric:Mood and affect appropriate  Neurologic:Cranial Nerves II-XII grossly intact  Musculoskeletal:antalgic gait  Bilateral lumbar paraspinals and SI joints nontender to palpation  Bilateral trochanteric flares nontender to palpation  Bilateral patellar and Achilles reflexes were 1/4 and symmetrical   No clonus was noted bilaterally  Bilateral lower extremity strength 5/5 in all muscle groups  Sensation intact to light touch in L3 through S1 dermatomes bilaterally  Negative straight leg raise bilaterally  Negative Lucas's test bilaterally  Imaging      PACS Images      Show images for XR spine lumbar minimum 4 views non injury   Study Result     LUMBAR SPINE     INDICATION:   M54 40: Lumbago with sciatica, left side  M54 28: Sciatica, unspecified side  Low back pain since February 10, 2019  No known injury      COMPARISON:  None     VIEWS:  XR SPINE LUMBAR MINIMUM 4 VIEWS NON INJURY  Images: 5     FINDINGS:  There is no evidence of acute fracture or destructive osseous lesion      Alignment is unremarkable      Disc space narrowing diffusely throughout the lumbar spine with degenerative endplate changes  Diffuse facet hypertrophic changes      The pedicles appear intact      Soft tissues are unremarkable      IMPRESSION:  Degenerative changes, advanced for the patient's age      Workstation performed: [id number]         PACS Images      Show images for MRI lumbar spine wo contrast   Study Result     MRI LUMBAR SPINE WITHOUT CONTRAST     INDICATION: Intractable low back pain with left sciatica      COMPARISON:  5/13/2019      TECHNIQUE:  Sagittal T1, sagittal T2, sagittal inversion recovery, axial T1 and axial T2, coronal T2     IMAGE QUALITY:  Diagnostic     FINDINGS:     VERTEBRAL BODIES:  There are 5 lumbar type vertebral bodies  Normal alignment of the lumbar spine    No evidence of osseous lesion or marrow edema      SACRUM:  Unremarkable     DISTAL CORD AND CONUS:  Normal signal morphology of the distal thoracic cord and conus, terminating at T12      PARASPINAL SOFT TISSUES:  No mass or fluid collection      Subcentimeter left renal cysts      LOWER THORACIC DISC SPACES:  T11-12 mild posterior disc bulge and paracentral protrusion  No significant central canal stenosis or neural foraminal narrowing      LUMBAR DISC SPACES:     L1-L2:  Posterior and lateral disc bulge  Superimposed paracentral to left foraminal zone disc protrusion  Ligamentum flavum and facet hypertrophy  Moderate central canal stenosis  Crowding of the cauda equina and encroachment of the traversing left   L2 nerve root in the subarticular zone  Mild left neural foraminal narrowing      L2-L3:  Posterior and lateral disc bulge  Ligamentum flavum flavum and facet hypertrophy  Moderate central canal stenosis and crowding of the cauda equina  Encroachment of the traversing bilateral L3 nerve roots in the subarticular zones  Enlargement   of the left L3 nerve root in the subarticular zone measuring up to 5 mm in diameter, possibly representing inflammation; however small meningioma or schwannoma cannot be excluded  Mild bilateral neural foraminal narrowing      L3-L4:  Posterior disc bulge and facet arthropathy  Mild central canal stenosis  Mild encroachment of the traversing L4 nerve roots in the subarticular zones  Mild bilateral neural foraminal narrowing      L4-L5:  Posterior disc osteophytes and disc bulge  Facet osteophytosis  Mild central canal stenosis  Mild encroachment of the traversing L5 nerve roots in the subarticular zones  Mild to moderate bilateral neural foraminal narrowing      L5-S1:  Posterior disc bulge and superimposed paracentral to right foraminal zone disc protrusion and annular tear  Mild central canal stenosis  Mass effect on the traversing right S1 nerve root in the subarticular zone    Mild to moderate bilateral   neural foraminal narrowing      IMPRESSION:        1  Multilevel disc and facet degenerative change, significantly advanced for age  2   Multilevel nerve root encroachment in the subarticular zones, most prominent at L1-2 and L2-3, involving the left L2 and L3 nerve roots  Mild enlargement of the left L3 nerve root may represent inflammatory neuropathy versus small meningioma or   schwannoma  Follow-up and post gadolinium imaging may be helpful  3    L5-S1 disc bulge resulting in chronic encroachment of the right S1 nerve root         Workstation performed: [id number]

## 2020-02-10 NOTE — DISCHARGE INSTR - LAB
Epidural Steroid Injection   WHAT YOU NEED TO KNOW:   An epidural steroid injection (DANIA) is a procedure to inject steroid medicine into the epidural space  The epidural space is between your spinal cord and vertebrae  Steroids reduce inflammation and fluid buildup in your spine that may be causing pain  You may be given pain medicine along with the steroids  ACTIVITY  · Do not drive or operate machinery today  · No strenuous activity today - bending, lifting, etc   · You may resume normal activites starting tomorrow - start slowly and as tolerated  · You may shower today, but no tub baths or hot tubs  · You may have numbness for several hours from the local anesthetic  Please use caution and common sense, especially with weight-bearing activities  CARE OF THE INJECTION SITE  · If you have soreness or pain, apply ice to the area today (20 minutes on/20 minutes off)  · Starting tomorrow, you may use warm, moist heat or ice if needed  · You may have an increase or change in your discomfort for 36-48 hours after your treatment  · Apply ice and continue with any pain medication you have been prescribed  · Notify [facility] if you have any of the following: redness, drainage, swelling, headache, stiff neck or fever above 100°F     SPECIAL INSTRUCTIONS  · Our office will contact you in approximately 7 days for a progress report  MEDICATIONS  · Continue to take all routine medications  · Our office may have instructed you to hold some medications  If you have a problem specifically related to your procedure, please call our office at [phone]  Problems not related to your procedure should be directed to your primary care physician

## 2020-02-11 ENCOUNTER — TELEPHONE (OUTPATIENT)
Dept: FAMILY MEDICINE CLINIC | Facility: CLINIC | Age: 35
End: 2020-02-11

## 2020-02-11 NOTE — TELEPHONE ENCOUNTER
I cannot do that  I saw him on the 31st, of January which was a Friday  I evaluated him and wrote a note to release him to return to work on That following Monday February 3rd  There is an extra week in there that is not accounted for

## 2020-02-11 NOTE — TELEPHONE ENCOUNTER
I do not know why he would need a work note for this week  I had him going back on the 3rd of February and I did fill out form for restrictions last week  If he needed to be out for epidural steroid injections it would be up to the treating specialist how long he will need to be out for that  As for his toe  It is difficult to comment without seeing it  If it is still swollen and bothersome he can schedule an appointment and I will take a look at it

## 2020-02-11 NOTE — TELEPHONE ENCOUNTER
PATIENT CALLED IN STATED THE WORK NOTE PROVIDED TO HIM HAD THE WRONG DATE  PATIENT IS REQUESTING FOR NOTE TO COVER FROM Tuesday February 04-February 7TH & RETURN TO WORK DATE SHOULD BE 2/10/2020  PLEASE CORRECT NOTE & GIVE PT A CALL -[phone] WHEN NOTE HAS BEEN INPUTTED INTO THE SYSTEM

## 2020-02-12 ENCOUNTER — TELEPHONE (OUTPATIENT)
Dept: PAIN MEDICINE | Facility: CLINIC | Age: 35
End: 2020-02-12

## 2020-02-12 ENCOUNTER — TELEPHONE (OUTPATIENT)
Dept: FAMILY MEDICINE CLINIC | Facility: CLINIC | Age: 35
End: 2020-02-12

## 2020-02-12 NOTE — TELEPHONE ENCOUNTER
Aside from the work note that we give patients after injections, I did not take him out of work  The patient may return to full duty 48 hours after injection

## 2020-02-12 NOTE — TELEPHONE ENCOUNTER
Patient called back he needs you to call Maryjo King at [phone] to give a verbal that he can go back to work without any restrictions  He had his injection in his back Monday and they said he can resume  Normal activity in 24 - 48 hours   If you need to speak to him you may call his cell [phone]

## 2020-02-12 NOTE — TELEPHONE ENCOUNTER
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

## 2020-02-12 NOTE — TELEPHONE ENCOUNTER
Chris doherty (Employer )  [phone]  Dr Olivia De La Cruz    Patient is asking to return to work  Mimi Cheng want's to know if there are any restrictions due to him having his injections? As of 20/10/20 when he received them?     Please fax note to [phone]

## 2020-02-13 ENCOUNTER — TELEPHONE (OUTPATIENT)
Dept: FAMILY MEDICINE CLINIC | Facility: CLINIC | Age: 35
End: 2020-02-13

## 2020-02-13 NOTE — TELEPHONE ENCOUNTER
NONA PRADO CALLED AND STATED THAT THE PT NEEDS A FAX NOTE SENT OVER  [phone] STATING  HE IS CLEARED  AND ABLE TO RETURN TO WORK WITH NO RESTRICTIONS  THANK YOU!

## 2020-02-13 NOTE — TELEPHONE ENCOUNTER
SW patient, states the confusion is his employer called the wrong office for a note to say he is able to go back to work  Patient states he is going to get in touch with his PCP today  His PCP is the one that has the note for him to return to work  Patient very pleasant and appreciative of call back

## 2020-02-13 NOTE — TELEPHONE ENCOUNTER
NONA PRADO CALLED AND STATED THAT THE PT NEEDS A FAX NOTE SENT OVER  [phone] STATING  HE IS CLEARED  AND ABLE TO RETURN TO WORK WITH NO RESTRICTIONS  THANK YOU!

## 2020-04-30 DIAGNOSIS — M25.539 CHRONIC WRIST PAIN, UNSPECIFIED LATERALITY: ICD-10-CM

## 2020-04-30 DIAGNOSIS — G89.29 CHRONIC WRIST PAIN, UNSPECIFIED LATERALITY: ICD-10-CM

## 2020-04-30 RX ORDER — DICLOFENAC SODIUM 75 MG/1
TABLET, DELAYED RELEASE ORAL
Qty: 60 TABLET | Refills: 0 | Status: SHIPPED | OUTPATIENT
Start: 2020-04-30 | End: 2020-06-22 | Stop reason: SDUPTHER

## 2020-06-22 ENCOUNTER — OFFICE VISIT (OUTPATIENT)
Dept: FAMILY MEDICINE CLINIC | Facility: CLINIC | Age: 35
End: 2020-06-22
Payer: COMMERCIAL

## 2020-06-22 VITALS
TEMPERATURE: 97.8 F | WEIGHT: 315 LBS | RESPIRATION RATE: 20 BRPM | DIASTOLIC BLOOD PRESSURE: 84 MMHG | HEART RATE: 94 BPM | SYSTOLIC BLOOD PRESSURE: 132 MMHG | BODY MASS INDEX: 39.17 KG/M2 | HEIGHT: 75 IN

## 2020-06-22 DIAGNOSIS — M10.022 ACUTE IDIOPATHIC GOUT OF LEFT ELBOW: Primary | ICD-10-CM

## 2020-06-22 DIAGNOSIS — G89.29 CHRONIC WRIST PAIN, UNSPECIFIED LATERALITY: ICD-10-CM

## 2020-06-22 DIAGNOSIS — M25.539 CHRONIC WRIST PAIN, UNSPECIFIED LATERALITY: ICD-10-CM

## 2020-06-22 DIAGNOSIS — M25.421 ELBOW SWELLING, RIGHT: ICD-10-CM

## 2020-06-22 PROCEDURE — 99214 OFFICE O/P EST MOD 30 MIN: CPT | Performed by: PHYSICIAN ASSISTANT

## 2020-06-22 PROCEDURE — 1036F TOBACCO NON-USER: CPT | Performed by: PHYSICIAN ASSISTANT

## 2020-06-22 PROCEDURE — 3008F BODY MASS INDEX DOCD: CPT | Performed by: PHYSICIAN ASSISTANT

## 2020-06-22 RX ORDER — COLCHICINE 0.6 MG/1
TABLET ORAL
Qty: 6 TABLET | Refills: 1 | Status: SHIPPED | OUTPATIENT
Start: 2020-06-22 | End: 2020-07-13 | Stop reason: SDUPTHER

## 2020-06-22 RX ORDER — DICLOFENAC SODIUM 75 MG/1
75 TABLET, DELAYED RELEASE ORAL 2 TIMES DAILY
Qty: 60 TABLET | Refills: 0 | Status: SHIPPED | OUTPATIENT
Start: 2020-06-22 | End: 2020-07-13 | Stop reason: SDUPTHER

## 2020-06-22 RX ORDER — PREDNISONE 10 MG/1
TABLET ORAL
COMMUNITY
Start: 2020-03-25 | End: 2021-01-18 | Stop reason: ALTCHOICE

## 2020-07-13 DIAGNOSIS — M25.539 CHRONIC WRIST PAIN, UNSPECIFIED LATERALITY: ICD-10-CM

## 2020-07-13 DIAGNOSIS — M25.421 ELBOW SWELLING, RIGHT: ICD-10-CM

## 2020-07-13 DIAGNOSIS — M10.022 ACUTE IDIOPATHIC GOUT OF LEFT ELBOW: ICD-10-CM

## 2020-07-13 DIAGNOSIS — G89.29 CHRONIC WRIST PAIN, UNSPECIFIED LATERALITY: ICD-10-CM

## 2020-07-13 RX ORDER — DICLOFENAC SODIUM 75 MG/1
75 TABLET, DELAYED RELEASE ORAL 2 TIMES DAILY
Qty: 60 TABLET | Refills: 0 | Status: SHIPPED | OUTPATIENT
Start: 2020-07-13 | End: 2020-09-15

## 2020-07-13 RX ORDER — COLCHICINE 0.6 MG/1
TABLET ORAL
Qty: 6 TABLET | Refills: 1 | Status: SHIPPED | OUTPATIENT
Start: 2020-07-13 | End: 2020-08-10 | Stop reason: SDUPTHER

## 2020-08-10 DIAGNOSIS — M25.421 ELBOW SWELLING, RIGHT: ICD-10-CM

## 2020-08-10 DIAGNOSIS — M10.022 ACUTE IDIOPATHIC GOUT OF LEFT ELBOW: ICD-10-CM

## 2020-08-10 RX ORDER — COLCHICINE 0.6 MG/1
TABLET ORAL
Qty: 6 TABLET | Refills: 1 | Status: SHIPPED | OUTPATIENT
Start: 2020-08-10 | End: 2020-09-21 | Stop reason: SDUPTHER

## 2020-09-04 ENCOUNTER — TRANSCRIBE ORDERS (OUTPATIENT)
Dept: LAB | Facility: CLINIC | Age: 35
End: 2020-09-04

## 2020-09-04 ENCOUNTER — TELEPHONE (OUTPATIENT)
Dept: FAMILY MEDICINE CLINIC | Facility: CLINIC | Age: 35
End: 2020-09-04

## 2020-09-04 ENCOUNTER — LAB (OUTPATIENT)
Dept: LAB | Facility: CLINIC | Age: 35
End: 2020-09-04
Payer: COMMERCIAL

## 2020-09-04 DIAGNOSIS — M10.022 ACUTE IDIOPATHIC GOUT OF LEFT ELBOW: ICD-10-CM

## 2020-09-04 LAB — URATE SERPL-MCNC: 11.2 MG/DL (ref 4.2–8)

## 2020-09-04 PROCEDURE — 36415 COLL VENOUS BLD VENIPUNCTURE: CPT

## 2020-09-04 PROCEDURE — 84550 ASSAY OF BLOOD/URIC ACID: CPT

## 2020-09-04 NOTE — TELEPHONE ENCOUNTER
Patient called in asking if we received his FMLA paperwork  I checked the binder, the forms by the fax machine, & Rajat's folder but the forms were not here  I asked patient to please fax or drop them off; patient stated he will drop them off on Tuesday 9/8

## 2020-09-05 NOTE — RESULT ENCOUNTER NOTE
Please let pt know that his uric acid level is elevated  He is a candidate for daily allopurinol which is taken to suppress the level and prevent gout attacks  If he is interested in this I can call it in to his pharmacy  Thank you

## 2020-09-08 DIAGNOSIS — M10.022 ACUTE IDIOPATHIC GOUT OF LEFT ELBOW: Primary | ICD-10-CM

## 2020-09-08 RX ORDER — ALLOPURINOL 100 MG/1
100 TABLET ORAL DAILY
Qty: 30 TABLET | Refills: 5 | Status: SHIPPED | OUTPATIENT
Start: 2020-09-08 | End: 2020-11-20 | Stop reason: SDUPTHER

## 2020-09-15 ENCOUNTER — TELEPHONE (OUTPATIENT)
Dept: FAMILY MEDICINE CLINIC | Facility: CLINIC | Age: 35
End: 2020-09-15

## 2020-09-15 DIAGNOSIS — G89.29 CHRONIC WRIST PAIN, UNSPECIFIED LATERALITY: ICD-10-CM

## 2020-09-15 DIAGNOSIS — M25.539 CHRONIC WRIST PAIN, UNSPECIFIED LATERALITY: ICD-10-CM

## 2020-09-15 RX ORDER — DICLOFENAC SODIUM 75 MG/1
TABLET, DELAYED RELEASE ORAL
Qty: 60 TABLET | Refills: 0 | Status: SHIPPED | OUTPATIENT
Start: 2020-09-15 | End: 2020-10-11

## 2020-09-15 NOTE — TELEPHONE ENCOUNTER
Pt called stating he started new meds on Saturday Allopurinol and he has been having hip, knee and ankle pain also having trouble moving, pt wants to know if he could stop taking this med

## 2020-09-15 NOTE — TELEPHONE ENCOUNTER
Pt's wife called back and states pt is so bad he can't even walk  I advised I would send message back again but told pt to go to ED ASAP then if it is this bad   kw

## 2020-09-16 NOTE — TELEPHONE ENCOUNTER
IF all his new symptoms started after initiated allopurinol than pt needs to stop it and can use his colchicine to help with current flair  He may need apt as well

## 2020-09-21 ENCOUNTER — OFFICE VISIT (OUTPATIENT)
Dept: FAMILY MEDICINE CLINIC | Facility: CLINIC | Age: 35
End: 2020-09-21
Payer: COMMERCIAL

## 2020-09-21 VITALS
BODY MASS INDEX: 39.17 KG/M2 | WEIGHT: 315 LBS | SYSTOLIC BLOOD PRESSURE: 122 MMHG | TEMPERATURE: 97.3 F | DIASTOLIC BLOOD PRESSURE: 70 MMHG | HEIGHT: 75 IN | HEART RATE: 88 BPM

## 2020-09-21 DIAGNOSIS — M10.022 ACUTE IDIOPATHIC GOUT OF LEFT ELBOW: Primary | ICD-10-CM

## 2020-09-21 DIAGNOSIS — M48.061 SPINAL STENOSIS OF LUMBAR REGION, UNSPECIFIED WHETHER NEUROGENIC CLAUDICATION PRESENT: ICD-10-CM

## 2020-09-21 DIAGNOSIS — Z00.00 HEALTHCARE MAINTENANCE: ICD-10-CM

## 2020-09-21 DIAGNOSIS — M25.421 ELBOW SWELLING, RIGHT: ICD-10-CM

## 2020-09-21 DIAGNOSIS — Z23 ENCOUNTER FOR IMMUNIZATION: ICD-10-CM

## 2020-09-21 PROCEDURE — 99395 PREV VISIT EST AGE 18-39: CPT | Performed by: PHYSICIAN ASSISTANT

## 2020-09-21 PROCEDURE — 90686 IIV4 VACC NO PRSV 0.5 ML IM: CPT

## 2020-09-21 PROCEDURE — 1036F TOBACCO NON-USER: CPT | Performed by: PHYSICIAN ASSISTANT

## 2020-09-21 PROCEDURE — 99214 OFFICE O/P EST MOD 30 MIN: CPT | Performed by: PHYSICIAN ASSISTANT

## 2020-09-21 PROCEDURE — 90471 IMMUNIZATION ADMIN: CPT

## 2020-09-21 RX ORDER — COLCHICINE 0.6 MG/1
TABLET ORAL
Qty: 6 TABLET | Refills: 1 | Status: SHIPPED | OUTPATIENT
Start: 2020-09-21 | End: 2020-11-20 | Stop reason: SDUPTHER

## 2020-09-21 NOTE — PATIENT INSTRUCTIONS
Assessment/plan:  1  Acute gout episode-patient started with symptoms on the 9/14/2020 and was out of work  He is improved with Colcrys and may return to work tomorrow, 9/22/2020 without restriction  2  Swelling of the elbow-resolved  3  Low back pain-history of lumbar disc pathology-stable  Patient did have a flare up in the past week which could have been related to gout episode  Continue to monitor  4  Healthcare maintenance-annual work physical performed  Lipid and CMP ordered  See separate note

## 2020-09-21 NOTE — PROGRESS NOTES
Assessment and Plan:  There are no Patient Instructions on file for this visit  Problem List Items Addressed This Visit        Other    Acute idiopathic gout of left elbow    Relevant Medications    colchicine (COLCRYS) 0 6 mg tablet      Other Visit Diagnoses     Healthcare maintenance    -  Primary    Relevant Orders    Lipid panel    Comprehensive metabolic panel    Elbow swelling, right        Relevant Medications    colchicine (COLCRYS) 0 6 mg tablet    Encounter for immunization        Relevant Orders    influenza vaccine, quadrivalent, 0 5 mL, preservative-free, for adult and pediatric patients 6 mos+ (AFLURIA, FLUARIX, FLULAVAL, FLUZONE) (Completed)                 Diagnoses and all orders for this visit:    Healthcare maintenance  -     Lipid panel; Future  -     Comprehensive metabolic panel; Future    Elbow swelling, right  -     colchicine (COLCRYS) 0 6 mg tablet; Two tabs at onset and then daily for 4 more days as needed gout flair  Acute idiopathic gout of left elbow  -     colchicine (COLCRYS) 0 6 mg tablet; Two tabs at onset and then daily for 4 more days as needed gout flair  Encounter for immunization  -     influenza vaccine, quadrivalent, 0 5 mL, preservative-free, for adult and pediatric patients 6 mos+ (AFLURIA, FLUARIX, FLULAVAL, FLUZONE)              Subjective:      Patient ID: Dain Dunn is a 38 y o  male  CC:    Chief Complaint   Patient presents with    Physical Exam     wellness exam for insurance  pt wants a return to work note   mgb       HPI:    HPI:  This is a 64-year-old gentleman that presents to the office for follow-up of gout and low back pain  He was seen in June by Kerry Lujan and diagnosed with gout  He recently had a return of symptoms on the 9/14/2020 and was unable to go to work because of it  He has not yet returned to work as of today  He does feel that his symptoms are getting better now after using Colchrys and anti-inflammatories    He also felt that his back was bothering him and flared up from it as well  He has had a history of ongoing recurrent lumbar back disc pathology  Patient is also here for physical exam for work physical   See separate note  The following portions of the patient's history were reviewed and updated as appropriate: allergies, current medications, past family history, past medical history, past social history, past surgical history and problem list       Review of Systems   Constitutional: Negative for fever  HENT: Negative for congestion  Respiratory: Negative for cough, chest tightness and shortness of breath  Cardiovascular: Negative for chest pain  Musculoskeletal: Positive for arthralgias and myalgias  Data to review:       Objective:    Vitals:    09/21/20 1404   BP: 122/70   BP Location: Left arm   Patient Position: Sitting   Cuff Size: Large   Pulse: 88   Temp: (!) 97 3 °F (36 3 °C)   TempSrc: Temporal   Weight: (!) 153 kg (338 lb 4 oz)   Height: 6' 3" (1 905 m)        Physical Exam  Constitutional:       General: He is not in acute distress  Appearance: He is well-developed  HENT:      Head: Normocephalic and atraumatic  Right Ear: Tympanic membrane normal       Left Ear: Tympanic membrane normal    Eyes:      Conjunctiva/sclera: Conjunctivae normal    Neck:      Musculoskeletal: Normal range of motion  Cardiovascular:      Rate and Rhythm: Normal rate and regular rhythm  Pulmonary:      Effort: Pulmonary effort is normal    Abdominal:      General: Abdomen is flat  Bowel sounds are normal  There is no distension  Palpations: Abdomen is soft  There is no mass  Musculoskeletal: Normal range of motion  Skin:     General: Skin is warm  Findings: No rash  Neurological:      Mental Status: He is alert and oriented to person, place, and time     Psychiatric:         Mood and Affect: Mood normal

## 2020-09-21 NOTE — PROGRESS NOTES
Assessment and Plan:  1  Health care maintenance-routine labs ordering including lipids and CMP which are required by his work to be completed  Form will be retained in the red nursing folder and faxed upon completion  Problem List Items Addressed This Visit        Other    Spinal stenosis of lumbar region    Acute idiopathic gout of left elbow - Primary    Relevant Medications    colchicine (COLCRYS) 0 6 mg tablet      Other Visit Diagnoses     Elbow swelling, right        Relevant Medications    colchicine (COLCRYS) 0 6 mg tablet    Encounter for immunization        Relevant Orders    influenza vaccine, quadrivalent, 0 5 mL, preservative-free, for adult and pediatric patients 6 mos+ (AFLURIA, FLUARIX, FLULAVAL, FLUZONE) (Completed)    Healthcare maintenance        Relevant Orders    Lipid panel    Comprehensive metabolic panel                 Diagnoses and all orders for this visit:    Acute idiopathic gout of left elbow  -     colchicine (COLCRYS) 0 6 mg tablet; Two tabs at onset and then daily for 4 more days as needed gout flair  Elbow swelling, right  -     colchicine (COLCRYS) 0 6 mg tablet; Two tabs at onset and then daily for 4 more days as needed gout flair  Spinal stenosis of lumbar region, unspecified whether neurogenic claudication present    Encounter for immunization  -     influenza vaccine, quadrivalent, 0 5 mL, preservative-free, for adult and pediatric patients 6 mos+ (AFLURIA, FLUARIX, FLULAVAL, FLUZONE)    Healthcare maintenance  -     Lipid panel; Future  -     Comprehensive metabolic panel; Future              Subjective:      Patient ID: Maida Bob is a 38 y o  male  CC:    Chief Complaint   Patient presents with    Physical Exam     wellness exam for insurance  pt wants a return to work note   mgb       HPI:    HPI:  This is a 78-year-old gentleman that presents the office for work physical   He is required to have a lipid panel and CMP yearly    Physical exam performed  The following portions of the patient's history were reviewed and updated as appropriate: allergies, current medications, past family history, past medical history, past social history, past surgical history and problem list       Review of Systems   Constitutional: Negative for chills, fatigue and fever  HENT: Negative for congestion, ear pain and sinus pressure  Eyes: Negative for visual disturbance  Respiratory: Negative for cough, chest tightness and shortness of breath  Cardiovascular: Negative for chest pain and palpitations  Gastrointestinal: Negative for diarrhea, nausea and vomiting  Endocrine: Negative for polyuria  Genitourinary: Negative for dysuria and frequency  Musculoskeletal: Negative for arthralgias and myalgias  Skin: Negative for pallor and rash  Neurological: Negative for dizziness, weakness, light-headedness, numbness and headaches  Psychiatric/Behavioral: Negative for agitation, behavioral problems and sleep disturbance  All other systems reviewed and are negative  Data to review:       Objective:    Vitals:    09/21/20 1404   BP: 122/70   BP Location: Left arm   Patient Position: Sitting   Cuff Size: Large   Pulse: 88   Temp: (!) 97 3 °F (36 3 °C)   TempSrc: Temporal   Weight: (!) 153 kg (338 lb 4 oz)   Height: 6' 3" (1 905 m)        Physical Exam  Constitutional:       General: He is not in acute distress  Appearance: He is well-developed  HENT:      Head: Normocephalic and atraumatic  Right Ear: Tympanic membrane normal       Left Ear: Tympanic membrane normal    Eyes:      Conjunctiva/sclera: Conjunctivae normal    Neck:      Musculoskeletal: Normal range of motion  Cardiovascular:      Rate and Rhythm: Normal rate and regular rhythm  Pulmonary:      Effort: Pulmonary effort is normal    Abdominal:      General: Abdomen is flat  Bowel sounds are normal  There is no distension  Palpations: Abdomen is soft   There is no mass    Musculoskeletal: Normal range of motion  Skin:     General: Skin is warm  Findings: No rash  Neurological:      Mental Status: He is alert and oriented to person, place, and time     Psychiatric:         Mood and Affect: Mood normal

## 2020-10-11 DIAGNOSIS — M25.539 CHRONIC WRIST PAIN, UNSPECIFIED LATERALITY: ICD-10-CM

## 2020-10-11 DIAGNOSIS — G89.29 CHRONIC WRIST PAIN, UNSPECIFIED LATERALITY: ICD-10-CM

## 2020-10-11 RX ORDER — DICLOFENAC SODIUM 75 MG/1
TABLET, DELAYED RELEASE ORAL
Qty: 60 TABLET | Refills: 0 | Status: SHIPPED | OUTPATIENT
Start: 2020-10-11 | End: 2020-11-14

## 2020-11-14 DIAGNOSIS — G89.29 CHRONIC WRIST PAIN, UNSPECIFIED LATERALITY: ICD-10-CM

## 2020-11-14 DIAGNOSIS — M25.539 CHRONIC WRIST PAIN, UNSPECIFIED LATERALITY: ICD-10-CM

## 2020-11-14 RX ORDER — DICLOFENAC SODIUM 75 MG/1
TABLET, DELAYED RELEASE ORAL
Qty: 60 TABLET | Refills: 0 | Status: SHIPPED | OUTPATIENT
Start: 2020-11-14 | End: 2020-12-15

## 2020-11-19 ENCOUNTER — TELEPHONE (OUTPATIENT)
Dept: FAMILY MEDICINE CLINIC | Facility: CLINIC | Age: 35
End: 2020-11-19

## 2020-11-19 DIAGNOSIS — M10.022 ACUTE IDIOPATHIC GOUT OF LEFT ELBOW: ICD-10-CM

## 2020-11-19 DIAGNOSIS — M25.421 ELBOW SWELLING, RIGHT: ICD-10-CM

## 2020-11-20 RX ORDER — COLCHICINE 0.6 MG/1
TABLET ORAL
Qty: 6 TABLET | Refills: 1 | Status: SHIPPED | OUTPATIENT
Start: 2020-11-20 | End: 2020-11-20

## 2020-11-20 RX ORDER — ALLOPURINOL 100 MG/1
100 TABLET ORAL DAILY
Qty: 30 TABLET | Refills: 5 | Status: SHIPPED | OUTPATIENT
Start: 2020-11-20 | End: 2020-11-20

## 2020-11-20 RX ORDER — COLCHICINE 0.6 MG/1
TABLET ORAL
Qty: 6 TABLET | Refills: 1 | Status: SHIPPED | OUTPATIENT
Start: 2020-11-20 | End: 2020-11-21 | Stop reason: SDUPTHER

## 2020-11-20 RX ORDER — ALLOPURINOL 100 MG/1
100 TABLET ORAL DAILY
Qty: 30 TABLET | Refills: 5 | Status: SHIPPED | OUTPATIENT
Start: 2020-11-20 | End: 2020-11-21 | Stop reason: SDUPTHER

## 2020-11-21 DIAGNOSIS — M25.421 ELBOW SWELLING, RIGHT: ICD-10-CM

## 2020-11-21 DIAGNOSIS — M10.022 ACUTE IDIOPATHIC GOUT OF LEFT ELBOW: ICD-10-CM

## 2020-11-21 RX ORDER — ALLOPURINOL 100 MG/1
100 TABLET ORAL DAILY
Qty: 30 TABLET | Refills: 5 | Status: SHIPPED | OUTPATIENT
Start: 2020-11-21 | End: 2021-01-18 | Stop reason: ALTCHOICE

## 2020-11-21 RX ORDER — COLCHICINE 0.6 MG/1
TABLET ORAL
Qty: 6 TABLET | Refills: 1 | Status: SHIPPED | OUTPATIENT
Start: 2020-11-21 | End: 2021-01-14

## 2020-12-15 DIAGNOSIS — M25.539 CHRONIC WRIST PAIN, UNSPECIFIED LATERALITY: ICD-10-CM

## 2020-12-15 DIAGNOSIS — G89.29 CHRONIC WRIST PAIN, UNSPECIFIED LATERALITY: ICD-10-CM

## 2020-12-15 RX ORDER — DICLOFENAC SODIUM 75 MG/1
TABLET, DELAYED RELEASE ORAL
Qty: 60 TABLET | Refills: 0 | Status: SHIPPED | OUTPATIENT
Start: 2020-12-15 | End: 2021-01-14

## 2021-01-14 DIAGNOSIS — M25.421 ELBOW SWELLING, RIGHT: ICD-10-CM

## 2021-01-14 DIAGNOSIS — M25.539 CHRONIC WRIST PAIN, UNSPECIFIED LATERALITY: ICD-10-CM

## 2021-01-14 DIAGNOSIS — M10.022 ACUTE IDIOPATHIC GOUT OF LEFT ELBOW: ICD-10-CM

## 2021-01-14 DIAGNOSIS — G89.29 CHRONIC WRIST PAIN, UNSPECIFIED LATERALITY: ICD-10-CM

## 2021-01-14 RX ORDER — DICLOFENAC SODIUM 75 MG/1
TABLET, DELAYED RELEASE ORAL
Qty: 60 TABLET | Refills: 0 | Status: SHIPPED | OUTPATIENT
Start: 2021-01-14 | End: 2021-02-08

## 2021-01-14 RX ORDER — COLCHICINE 0.6 MG/1
TABLET ORAL
Qty: 5 TABLET | Refills: 1 | Status: SHIPPED | OUTPATIENT
Start: 2021-01-14 | End: 2021-03-19

## 2021-01-15 ENCOUNTER — TELEPHONE (OUTPATIENT)
Dept: FAMILY MEDICINE CLINIC | Facility: CLINIC | Age: 36
End: 2021-01-15

## 2021-01-15 NOTE — TELEPHONE ENCOUNTER
Pt requested an Aderall refill; stated that he needs a  Lab order before he can get it refilled? Please call him at [phone]   Thank you

## 2021-01-18 ENCOUNTER — TELEMEDICINE (OUTPATIENT)
Dept: FAMILY MEDICINE CLINIC | Facility: CLINIC | Age: 36
End: 2021-01-18
Payer: COMMERCIAL

## 2021-01-18 DIAGNOSIS — M10.9 ACUTE GOUT OF LEFT WRIST, UNSPECIFIED CAUSE: Primary | ICD-10-CM

## 2021-01-18 PROCEDURE — 99213 OFFICE O/P EST LOW 20 MIN: CPT | Performed by: NURSE PRACTITIONER

## 2021-01-18 PROCEDURE — 1036F TOBACCO NON-USER: CPT | Performed by: NURSE PRACTITIONER

## 2021-01-18 NOTE — PATIENT INSTRUCTIONS
Problem List Items Addressed This Visit        Musculoskeletal and Integument    Acute gout of left wrist - Primary     Relieved with colchicine use over the past few days  Spoke with patient about starting allopurinol to prevent gout attacks however, patient states that he had increased pain in the past when using Allopurinol so he is not currently interested in starting medication  [address]    Your healthcare provider and/or public health staff have evaluated you and have determined that you do not need to remain in the hospital at this time  At this time you can be isolated at home where you will be monitored by staff from your local or state health department  You should carefully follow the prevention and isolation steps below until a healthcare provider or local or state health department says that you can return to your normal activities  Stay home except to get medical care    People who are mildly ill with COVID-19 are able to isolate at home during their illness  You should restrict activities outside your home, except for getting medical care  Do not go to work, school, or public areas  Avoid using public transportation, ride-sharing, or taxis  Separate yourself from other people and animals in your home    People: As much as possible, you should stay in a specific room and away from other people in your home  Also, you should use a separate bathroom, if available  Animals: You should restrict contact with pets and other animals while you are sick with COVID-19, just like you would around other people  Although there have not been reports of pets or other animals becoming sick with COVID-19, it is still recommended that people sick with COVID-19 limit contact with animals until more information is known about the virus  When possible, have another member of your household care for your animals while you are sick   If you are sick with COVID-19, avoid contact with your pet, including petting, snuggling, being kissed or licked, and sharing food  If you must care for your pet or be around animals while you are sick, wash your hands before and after you interact with pets and wear a facemask  See COVID-19 and Animals for more information  Call ahead before visiting your doctor    If you have a medical appointment, call the healthcare provider and tell them that you have or may have COVID-19  This will help the healthcare providers office take steps to keep other people from getting infected or exposed  Wear a facemask    You should wear a facemask when you are around other people (e g , sharing a room or vehicle) or pets and before you enter a healthcare providers office  If you are not able to wear a facemask (for example, because it causes trouble breathing), then people who live with you should not stay in the same room with you, or they should wear a facemask if they enter your room  Cover your coughs and sneezes    Cover your mouth and nose with a tissue when you cough or sneeze  Throw used tissues in a lined trash can  Immediately wash your hands with soap and water for at least 20 seconds or, if soap and water are not available, clean your hands with an alcohol-based hand  that contains at least 60% alcohol  Clean your hands often    Wash your hands often with soap and water for at least 20 seconds, especially after blowing your nose, coughing, or sneezing; going to the bathroom; and before eating or preparing food  If soap and water are not readily available, use an alcohol-based hand  with at least 60% alcohol, covering all surfaces of your hands and rubbing them together until they feel dry  Soap and water are the best option if hands are visibly dirty  Avoid touching your eyes, nose, and mouth with unwashed hands      Avoid sharing personal household items    You should not share dishes, drinking glasses, cups, eating utensils, towels, or bedding with other people or pets in your home  After using these items, they should be washed thoroughly with soap and water  Clean all high-touch surfaces everyday    High touch surfaces include counters, tabletops, doorknobs, bathroom fixtures, toilets, phones, keyboards, tablets, and bedside tables  Also, clean any surfaces that may have blood, stool, or body fluids on them  Use a household cleaning spray or wipe, according to the label instructions  Labels contain instructions for safe and effective use of the cleaning product including precautions you should take when applying the product, such as wearing gloves and making sure you have good ventilation during use of the product  Monitor your symptoms    Seek prompt medical attention if your illness is worsening (e g , difficulty breathing)  Before seeking care, call your healthcare provider and tell them that you have, or are being evaluated for, COVID-19  Put on a facemask before you enter the facility  These steps will help the healthcare providers office to keep other people in the office or waiting room from getting infected or exposed  Ask your healthcare provider to call the local or [facility] health department  Persons who are placed under active monitoring or facilitated self-monitoring should follow instructions provided by their local health department or occupational health professionals, as appropriate  If you have a medical emergency and need to call 911, notify the dispatch personnel that you have, or are being evaluated for COVID-19  If possible, put on a facemask before emergency medical services arrive      Discontinuing home isolation    Patients with confirmed COVID-19 should remain under home isolation precautions until the following conditions are met:   - They have had no fever for at least 24 hours (that is one full day of no fever without the use medicine that reduces fevers)  AND  - other symptoms have improved (for example, when their cough or shortness of breath have improved)  AND  - If had mild or moderate illness, at least 10 days have passed since their symptoms first appeared or if severe illness (needed oxygen) or immunosuppressed, at least 20 days have passed since symptoms first appeared  Patients with confirmed COVID-19 should also notify close contacts (including their workplace) and ask that they self-quarantine  Currently, close contact is defined as being within 6 feet for 15 minutes or more from the period 24 hours starting 48 hours before symptom onset to the time at which the patient went into isolation  Close contacts of patients diagnosed with COVID-19 should be instructed by the patient to self-quarantine for 14 days from the last time of their last contact with the patient  Source: [employer]    Gout   WHAT YOU NEED TO KNOW:   Gout is a form of arthritis that causes severe joint pain, redness, swelling, and stiffness  Acute gout pain starts suddenly, gets worse quickly, and stops on its own  Acute gout can become chronic and cause permanent damage to the joints  DISCHARGE INSTRUCTIONS:   Return to the emergency department if:   · You have severe pain in one or more of your joints that you cannot tolerate  · You have a fever or redness that spreads beyond the joint area  Call your doctor if:   · You have new symptoms, such as a rash, after you start gout treatment  · Your joint pain and swelling do not go away, even after treatment  · You are not urinating as much or as often as you usually do  · You have trouble taking your gout medicines  · You have questions or concerns about your condition or care  Medicines: You may need any of the following:  · Prescription pain medicine  may be given  Ask your healthcare provider how to take this medicine safely  Some prescription pain medicines contain acetaminophen   Do not take other medicines that contain acetaminophen without talking to your healthcare provider  Too much acetaminophen may cause liver damage  Prescription pain medicine may cause constipation  Ask your healthcare provider how to prevent or treat constipation  · NSAIDs , such as ibuprofen, help decrease swelling, pain, and fever  This medicine is available with or without a doctor's order  NSAIDs can cause stomach bleeding or kidney problems in certain people  If you take blood thinner medicine, always ask your healthcare provider if NSAIDs are safe for you  Always read the medicine label and follow directions  · Gout medicine  decreases joint pain and swelling  It may also be given to prevent new gout attacks  · Steroids  reduce inflammation and can help your joint stiffness and pain during gout attacks  · Uric acid medicine  may be given to reduce the amount of uric acid your body makes  Some medicines may help you pass more uric acid when you urinate  · Take your medicine as directed  Contact your healthcare provider if you think your medicine is not helping or if you have side effects  Tell him or her if you are allergic to any medicine  Keep a list of the medicines, vitamins, and herbs you take  Include the amounts, and when and why you take them  Bring the list or the pill bottles to follow-up visits  Carry your medicine list with you in case of an emergency  Manage your symptoms:   · Rest your painful joint so it can heal   Your healthcare provider may recommend crutches or a walker if the affected joint is in a leg  · Apply ice to your joint  Ice decreases pain and swelling  Use an ice pack, or put crushed ice in a plastic bag  Cover the ice pack or bag with a towel before you apply it to your painful joint  Apply ice for 15 to 20 minutes every hour, or as directed  · Elevate your joint  Elevation helps reduce swelling and pain   Raise your joint above the level of your heart as often as you can  Prop your painful joint on pillows to keep it above your heart comfortably  · Go to physical therapy if directed  A physical therapist can teach you exercises to improve flexibility and range of motion  Help prevent gout attacks:   · Do not eat high-purine foods  These foods include meats, seafood, asparagus, spinach, cauliflower, and some types of beans  Healthcare providers may tell you to eat more low-fat milk products, such as yogurt  Milk products may decrease your risk for gout attacks  Vitamin C and coffee may also help  Your healthcare provider or dietitian can help you create a meal plan  · Drink liquids as directed  Liquids such as water help remove uric acid from your body  Ask how much liquid to drink each day and which liquids are best for you  · Maintain a healthy weight  Weight loss may decrease the amount of uric acid in your body  Ask your healthcare provider how much you should weigh  Ask him to help you create a weight loss plan if you are overweight  · Control your blood sugar level if you have diabetes  Keep your blood sugar level in a normal range  This can help prevent gout attacks  · Limit or do not drink alcohol as directed  Alcohol can trigger a gout attack  Alcohol also increases your risk for dehydration  Ask your healthcare provider if alcohol is safe for you  Follow up with your doctor as directed: You may be referred to a rheumatologist or podiatrist  Write down your questions so you remember to ask them during your visits  © Copyright [address] Information is for End User's use only and may not be sold, redistributed or otherwise used for commercial purposes  All illustrations and images included in CareNotes® are the copyrighted property of A D A M , Inc  or [facility]   The above information is an  only  It is not intended as medical advice for individual conditions or treatments   Talk to your doctor, nurse or pharmacist before following any medical regimen to see if it is safe and effective for you

## 2021-01-18 NOTE — ASSESSMENT & PLAN NOTE
Relieved with colchicine use over the past few days  Spoke with patient about starting allopurinol to prevent gout attacks however, patient states that he had increased pain in the past when using Allopurinol so he is not currently interested in starting medication

## 2021-01-18 NOTE — LETTER
January 18, 2021     Patient: Clair Mo   YOB: 1985   Date of Visit: 1/18/2021       To Whom it May Concern:    Clair Mo is under my professional care  He was seen in my office on 1/18/2021  He was not sandra to attend work on 1/13-1/16 due to acute gout flare and should be excused for these absences  Patient may return to work on 1/20/21 with no restrictions  If you have any questions or concerns, please don't hesitate to call           Sincerely,          SHAYLA Solorzano        CC: No Recipients

## 2021-01-18 NOTE — PROGRESS NOTES
Virtual Regular Visit      Assessment/Plan:    Problem List Items Addressed This Visit        Musculoskeletal and Integument    Acute gout of left wrist - Primary     Relieved with colchicine use over the past few days  Spoke with patient about starting allopurinol to prevent gout attacks however, patient states that he had increased pain in the past when using Allopurinol so he is not currently interested in starting medication  Reason for visit is   Chief Complaint   Patient presents with    Letter for School/Work     patient was out of work last week, wed, thurs, fri, and sat  because of gout and now needs a note for work   Virtual Regular Visit        Encounter provider SHAYLA Enamorado    Provider located at [address] [place] [id number]      Recent Visits  Date Type Provider Dept   01/15/21 Telephone Tracy Diaz, [facility]   Showing recent visits within past 7 days and meeting all other requirements     Today's Visits  Date Type Provider Dept   01/18/21 Telemedicine [facility]   Showing today's visits and meeting all other requirements     Future Appointments  No visits were found meeting these conditions  Showing future appointments within next 150 days and meeting all other requirements        The patient was identified by name and date of birth  Ludy Saleem was informed that this is a telemedicine visit and that the visit is being conducted through [employer] and patient was informed that this is not a secure, HIPAA-compliant platform  He agrees to proceed     My office door was closed  No one else was in the room  He acknowledged consent and understanding of privacy and security of the video platform  The patient has agreed to participate and understands they can discontinue the visit at any time  Patient is aware this is a billable service  Subjective  Federica Galarza is a 38 y o  male    Patient was having left wrist pain that they felt was due to gout  He states that the pain began on Monday 1/11 and this prevented him from being able to go to work from 1/20-1/23  Patient has a history of gout flares in this area in the past  Patient had his Colchicine prescription filled on 1/14 and has been taking the medication since this time  Patient has also been wearing a wrist brace on the left wrist   Patient states that since he started using the brace and taking the colchicine that his pain is much more controlled and that he would like a note to return to work on Wednesday 1/20  Past Medical History:   Diagnosis Date    Arthritis        History reviewed  No pertinent surgical history  Current Outpatient Medications   Medication Sig Dispense Refill    colchicine (COLCRYS) 0 6 mg tablet TAKE 2 TABLETS BY MOUTH AT ONSET,THEN ONE DAILY FOR 4 MORE DAYS AS NEEDED FOR GOUT FLARE 5 tablet 1    diclofenac (VOLTAREN) 75 mg EC tablet TAKE 1 TABLET BY MOUTH TWICE A DAY*OK 7/15/20 60 tablet 0     No current facility-administered medications for this visit  Allergies   Allergen Reactions    Gabapentin     Zyloprim [Allopurinol] Myalgia       Review of Systems   Constitutional: Negative for chills, fatigue and fever  HENT: Negative for ear pain and sore throat  Eyes: Negative for pain and visual disturbance  Respiratory: Negative for cough, chest tightness, shortness of breath and wheezing  Cardiovascular: Negative for chest pain and palpitations  Gastrointestinal: Negative for abdominal pain, constipation, diarrhea, nausea and vomiting  Endocrine: Negative for cold intolerance and heat intolerance  Genitourinary: Negative for dysuria and hematuria  Musculoskeletal: Positive for arthralgias (left wrist pain)  Negative for back pain, joint swelling and myalgias  Skin: Negative for color change and rash  Allergic/Immunologic: Negative for environmental allergies  Neurological: Negative for dizziness, seizures, syncope, weakness, light-headedness and headaches  Hematological: Negative for adenopathy  Psychiatric/Behavioral: Negative for decreased concentration  The patient is not nervous/anxious  All other systems reviewed and are negative  Video Exam    There were no vitals filed for this visit  Physical Exam  Vitals reviewed: limited due to [employer] exam    Constitutional:       General: He is not in acute distress  Appearance: Normal appearance  He is not ill-appearing  Neurological:      Mental Status: He is alert  I spent 15 minutes directly with the patient during this visit      VIRTUAL VISIT DISCLAIMER    Adia [place] acknowledges that he has consented to an online visit or consultation  He understands that the online visit is based solely on information provided by him, and that, in the absence of a face-to-face physical evaluation by the physician, the diagnosis he receives is both limited and provisional in terms of accuracy and completeness  This is not intended to replace a full medical face-to-face evaluation by the physician  Adia Schwartz understands and accepts these terms

## 2021-02-06 DIAGNOSIS — M25.539 CHRONIC WRIST PAIN, UNSPECIFIED LATERALITY: ICD-10-CM

## 2021-02-06 DIAGNOSIS — G89.29 CHRONIC WRIST PAIN, UNSPECIFIED LATERALITY: ICD-10-CM

## 2021-02-08 RX ORDER — DICLOFENAC SODIUM 75 MG/1
TABLET, DELAYED RELEASE ORAL
Qty: 60 TABLET | Refills: 0 | Status: SHIPPED | OUTPATIENT
Start: 2021-02-08 | End: 2021-03-14

## 2021-03-14 DIAGNOSIS — M25.539 CHRONIC WRIST PAIN, UNSPECIFIED LATERALITY: ICD-10-CM

## 2021-03-14 DIAGNOSIS — G89.29 CHRONIC WRIST PAIN, UNSPECIFIED LATERALITY: ICD-10-CM

## 2021-03-14 RX ORDER — DICLOFENAC SODIUM 75 MG/1
TABLET, DELAYED RELEASE ORAL
Qty: 60 TABLET | Refills: 0 | Status: SHIPPED | OUTPATIENT
Start: 2021-03-14 | End: 2021-04-14

## 2021-03-19 DIAGNOSIS — M25.421 ELBOW SWELLING, RIGHT: ICD-10-CM

## 2021-03-19 DIAGNOSIS — M10.022 ACUTE IDIOPATHIC GOUT OF LEFT ELBOW: ICD-10-CM

## 2021-03-19 RX ORDER — COLCHICINE 0.6 MG/1
TABLET ORAL
Qty: 6 TABLET | Refills: 1 | Status: SHIPPED | OUTPATIENT
Start: 2021-03-19 | End: 2021-06-18 | Stop reason: SDUPTHER

## 2021-04-08 ENCOUNTER — TELEPHONE (OUTPATIENT)
Dept: FAMILY MEDICINE CLINIC | Facility: CLINIC | Age: 36
End: 2021-04-08

## 2021-04-08 DIAGNOSIS — Z23 ENCOUNTER FOR IMMUNIZATION: ICD-10-CM

## 2021-04-10 ENCOUNTER — TELEMEDICINE (OUTPATIENT)
Dept: FAMILY MEDICINE CLINIC | Facility: CLINIC | Age: 36
End: 2021-04-10
Payer: COMMERCIAL

## 2021-04-10 DIAGNOSIS — U07.1 COVID-19: Primary | ICD-10-CM

## 2021-04-10 PROCEDURE — 99212 OFFICE O/P EST SF 10 MIN: CPT | Performed by: PHYSICIAN ASSISTANT

## 2021-04-10 NOTE — PROGRESS NOTES
COVID-19 Outpatient Progress Note    Assessment/Plan:    Problem List Items Addressed This Visit     None         Disposition:     Assessment/plan:  1  COVID-19 infection-patient positive at [facility] on 4/9/2021  Recommend quarantine until the 18th of April and return to work on the 19th of April  Will follow-up with him on Tuesday  He is not having any respiratory difficulty or shortness of breath  He will contact the office if symptoms would worsen sooner  I have spent 15 minutes directly with the patient  Encounter provider Christal Beckwith PA-C    Provider located at [address] [place] [address] PRIMARY CARE  [facility] [phone]    Recent Visits  Date Type Provider Dept   04/08/21 Telephone [facility]   Showing recent visits within past 7 days and meeting all other requirements     Today's Visits  Date Type Provider Dept   04/10/21 Telemedicine Christal Beckwith PA-C [facility]   Showing today's visits and meeting all other requirements     Future Appointments  No visits were found meeting these conditions  Showing future appointments within next 150 days and meeting all other requirements      This virtual check-in was done via [employer] and patient was informed that this is not a secure, HIPAA-compliant platform  He agrees to proceed  Patient agrees to participate in a virtual check in via telephone or video visit instead of presenting to the office to address urgent/immediate medical needs  Patient is aware this is a billable service  After connecting through [facility], the patient was identified by name and date of birth  June Bran was informed that this was a telemedicine visit and that the exam was being conducted confidentially over secure lines  My office door was closed  No one else was in the room  June Bran acknowledged consent and understanding of privacy and security of the telemedicine visit   I informed the patient that I have reviewed his record in Epic and presented the opportunity for him to ask any questions regarding the visit today  The patient agreed to participate  Subjective:   Celeste Valdivia is a 43 y o  male who is concerned about COVID-19  Patient's symptoms include fever, fatigue, malaise, diarrhea, myalgias and headache  Patient denies chills, congestion, rhinorrhea, sore throat, anosmia, loss of taste, cough, shortness of breath, chest tightness, abdominal pain, nausea and vomiting  HPI:  This is a 38-year-old gentleman that presents via virtual video visit using [employer] platform  He is seen for newly positive COVID test at [facility] yesterday  He does feel that he is getting a little bit better in his fever has broken today  He does not recall being around anybody and he was actually on leave from work when he got sick  He had only been to a few places like [employer] and cannot think of anybody else that he was around that was ill  No results found for: [place] [address], [address] [facility], [address] [place] [address],[address] [address]  Past Medical History:   Diagnosis Date    Arthritis      History reviewed  No pertinent surgical history  Current Outpatient Medications   Medication Sig Dispense Refill    colchicine (COLCRYS) 0 6 mg tablet TWO TABS AT ONSET AND THEN DAILY FOR 4 MORE DAYS AS NEEDED GOUT FLAIR  (NOT COVERED ON INSURANCE) 6 tablet 1    diclofenac (VOLTAREN) 75 mg EC tablet TAKE 1 TABLET BY MOUTH TWICE A DAY 60 tablet 0     No current facility-administered medications for this visit  Allergies   Allergen Reactions    Gabapentin     Zyloprim [Allopurinol] Myalgia       Review of Systems   Constitutional: Positive for fatigue and fever  Negative for chills  HENT: Negative for congestion, rhinorrhea and sore throat  Respiratory: Negative for cough, chest tightness and shortness of breath  Gastrointestinal: Positive for diarrhea   Negative for abdominal pain, nausea and vomiting  Musculoskeletal: Positive for myalgias  Neurological: Positive for headaches  Objective: There were no vitals filed for this visit  Physical Exam  Constitutional:       General: He is not in acute distress  Appearance: He is well-developed  HENT:      Head: Normocephalic and atraumatic  Eyes:      Conjunctiva/sclera: Conjunctivae normal    Neck:      Musculoskeletal: Normal range of motion  Pulmonary:      Effort: Pulmonary effort is normal    Skin:     Findings: No rash  Neurological:      Mental Status: He is alert and oriented to person, place, and time  VIRTUAL VISIT DISCLAIMER    Glenna White acknowledges that he has consented to an online visit or consultation  He understands that the online visit is based solely on information provided by him, and that, in the absence of a face-to-face physical evaluation by the physician, the diagnosis he receives is both limited and provisional in terms of accuracy and completeness  This is not intended to replace a full medical face-to-face evaluation by the physician  Glenna White understands and accepts these terms

## 2021-04-10 NOTE — LETTER
April 10, 2021     Patient: Simran Kimble   YOB: 1985   Date of Visit: 4/10/2021       To Whom it May Concern:    Simran Kimble is under my professional care  He was seen via virtual video visit on 4/10/2021  He has tested positive for COVID-19 infection and may not return to work until 4/19/2021  If you have any questions or concerns, please don't hesitate to call           Sincerely,          Sophia Beltre PA-C        CC: No Recipients

## 2021-04-13 ENCOUNTER — TELEMEDICINE (OUTPATIENT)
Dept: FAMILY MEDICINE CLINIC | Facility: CLINIC | Age: 36
End: 2021-04-13
Payer: COMMERCIAL

## 2021-04-13 DIAGNOSIS — U07.1 COVID-19: Primary | ICD-10-CM

## 2021-04-13 PROCEDURE — 99212 OFFICE O/P EST SF 10 MIN: CPT | Performed by: PHYSICIAN ASSISTANT

## 2021-04-13 NOTE — PROGRESS NOTES
COVID-19 Outpatient Progress Note    Assessment/Plan:    Problem List Items Addressed This Visit        Other    COVID-19 - Primary         Disposition:     Assessment/plan:  1  COVID-19 infection-patient appears to be improving  Most of his fever and other symptoms have broken  He has a little bit of residual fatigue but he has been able to get around the house without much difficulty  He has not had problems with shortness of breath, cough, or tightness in the chest   He does have some loose stools which seems to come and go  He is under quarantine until Monday the 19th of April  I will follow-up with him this Friday but he may contact me through the office if he needs anything sooner  I have spent 10 minutes directly with the patient  Encounter provider Samantha David PA-C    Provider located at [address] [place] [phone]    Recent Visits  Date Type Provider Dept   04/10/21 Telemedicine Samantha David PA-C [facility]   04/08/21 Telephone [facility]   Showing recent visits within past 7 days and meeting all other requirements     Today's Visits  Date Type Provider Dept   04/13/21 Telemedicine Samantha David PA-C Pg [facility]   Showing today's visits and meeting all other requirements     Future Appointments  No visits were found meeting these conditions  Showing future appointments within next 150 days and meeting all other requirements      This virtual check-in was done via Google Duo and patient was informed that this is not a secure, HIPAA-compliant platform  He agrees to proceed  Patient agrees to participate in a virtual check in via telephone or video visit instead of presenting to the office to address urgent/immediate medical needs  Patient is aware this is a billable service  After connecting through [facility], the patient was identified by name and date of birth  Liliana Bermeo was informed that this was a telemedicine visit and that the exam was being conducted confidentially over secure lines  My office door was closed  No one else was in the room  Liliana Bermeo acknowledged consent and understanding of privacy and security of the telemedicine visit  I informed the patient that I have reviewed his record in Epic and presented the opportunity for him to ask any questions regarding the visit today  The patient agreed to participate  Subjective:   Liliana Bermeo is a 43 y o  male who has been screened for COVID-19  Symptom change since last report: resolving  Patient's symptoms include fatigue and diarrhea  Patient denies fever (4/10/2021), chills, congestion, rhinorrhea, sore throat, anosmia, loss of taste, cough, shortness of breath, chest tightness, abdominal pain, nausea, vomiting, myalgias and headaches  Nakul Strong has been staying home and has isolated themselves in his home  He is taking care to not share personal items and is cleaning all surfaces that are touched often, like counters, tabletops, and doorknobs using household cleaning sprays or wipes  He is wearing a mask when he leaves his room  Date of symptom onset: 4/9/2021    HPI:  This is a 27-year-old gentleman that presents via virtual video visit using Google duo platform  He is seen for follow-up to COVID-19 infection  Patient does feel they many of his symptoms broken he is doing better  Initially he had some fever which has gone away  He has had a little bit of loose stools which seems to come and go and he has some generalized fatigue but he has not had any difficulty getting around the house or doing chores  He denies shortness of breath or tightness in the chest     No results found for: RASHARD Diaz, [address]  Past Medical History:   Diagnosis Date    Arthritis      No past surgical history on file    Current Outpatient Medications   Medication Sig Dispense Refill    colchicine (COLCRYS) 0 6 mg tablet TWO TABS AT ONSET AND THEN DAILY FOR 4 MORE DAYS AS NEEDED GOUT FLAIR  (NOT COVERED ON INSURANCE) 6 tablet 1    diclofenac (VOLTAREN) 75 mg EC tablet TAKE 1 TABLET BY MOUTH TWICE A DAY 60 tablet 0     No current facility-administered medications for this visit  Allergies   Allergen Reactions    Gabapentin     Zyloprim [Allopurinol] Myalgia       Review of Systems   Constitutional: Positive for fatigue  Negative for chills and fever (4/10/2021)  HENT: Negative for congestion, rhinorrhea and sore throat  Respiratory: Negative for cough, chest tightness and shortness of breath  Gastrointestinal: Positive for diarrhea  Negative for abdominal pain, nausea and vomiting  Musculoskeletal: Negative for myalgias  Neurological: Negative for headaches  Objective: There were no vitals filed for this visit  Physical Exam  Constitutional:       General: He is not in acute distress  Appearance: He is well-developed  HENT:      Head: Normocephalic and atraumatic  Eyes:      Conjunctiva/sclera: Conjunctivae normal    Neck:      Musculoskeletal: Normal range of motion  Pulmonary:      Effort: Pulmonary effort is normal    Skin:     Findings: No rash  Neurological:      Mental Status: He is alert and oriented to person, place, and time  VIRTUAL VISIT DISCLAIMER    Saran Mccollum acknowledges that he has consented to an online visit or consultation  He understands that the online visit is based solely on information provided by him, and that, in the absence of a face-to-face physical evaluation by the physician, the diagnosis he receives is both limited and provisional in terms of accuracy and completeness  This is not intended to replace a full medical face-to-face evaluation by the physician  Saran Mccollum understands and accepts these terms

## 2021-04-14 DIAGNOSIS — G89.29 CHRONIC WRIST PAIN, UNSPECIFIED LATERALITY: ICD-10-CM

## 2021-04-14 DIAGNOSIS — M25.539 CHRONIC WRIST PAIN, UNSPECIFIED LATERALITY: ICD-10-CM

## 2021-04-14 RX ORDER — DICLOFENAC SODIUM 75 MG/1
TABLET, DELAYED RELEASE ORAL
Qty: 60 TABLET | Refills: 0 | Status: SHIPPED | OUTPATIENT
Start: 2021-04-14 | End: 2021-05-07

## 2021-04-16 ENCOUNTER — TELEMEDICINE (OUTPATIENT)
Dept: FAMILY MEDICINE CLINIC | Facility: CLINIC | Age: 36
End: 2021-04-16
Payer: COMMERCIAL

## 2021-04-16 DIAGNOSIS — U07.1 COVID-19: Primary | ICD-10-CM

## 2021-04-16 PROCEDURE — 1036F TOBACCO NON-USER: CPT | Performed by: PHYSICIAN ASSISTANT

## 2021-04-16 PROCEDURE — 99212 OFFICE O/P EST SF 10 MIN: CPT | Performed by: PHYSICIAN ASSISTANT

## 2021-04-16 NOTE — PROGRESS NOTES
COVID-19 Outpatient Progress Note    Assessment/Plan:    Problem List Items Addressed This Visit        Other    COVID-19 - Primary         Disposition:     Assessment/plan:  1  COVID-19 infection-patient is feeling better and has been asymptomatic  His quarantine will and on Sunday the 18th of April  He may return to work without restriction as early as Monday the 19th of April if allowed by his employer  I have spent 15 minutes directly with the patient  Encounter provider Kush Wills PA-C    Provider located at [address] [place] [phone]    Recent Visits  Date Type Provider Dept   04/13/21 Telemedicine Kush Wills PA-C P[facility]   04/10/21 Telemedicine [facility]   Showing recent visits within past 7 days and meeting all other requirements     Today's Visits  Date Type Provider Dept   04/16/21 Telemedicine Kush Wills PA-C Pg [facility]   Showing today's visits and meeting all other requirements     Future Appointments  No visits were found meeting these conditions  Showing future appointments within next 150 days and meeting all other requirements      This virtual check-in was done via Google Duo and patient was informed that this is not a secure, HIPAA-compliant platform  He agrees to proceed  Patient agrees to participate in a virtual check in via telephone or video visit instead of presenting to the office to address urgent/immediate medical needs  Patient is aware this is a billable service  After connecting through [facility], the patient was identified by name and date of birth  Lois Oliver was informed that this was a telemedicine visit and that the exam was being conducted confidentially over secure lines  Lois Oliver acknowledged consent and understanding of privacy and security of the telemedicine visit   I informed the patient that I have reviewed his record in [address] [place] [address] and presented the opportunity for him to ask any questions regarding the visit today  The patient agreed to participate  Subjective:   Celeste Valdivia is a 43 y o  male who has been screened for COVID-19  Symptom change since last report: resolving  Patient is currently asymptomatic  Patient denies fever, chills, fatigue, malaise, congestion, rhinorrhea, sore throat, anosmia, loss of taste, cough, shortness of breath, chest tightness, abdominal pain, nausea, vomiting, diarrhea, myalgias and headaches  Rogelio Francisco has been staying home and has isolated themselves in his home  He is taking care to not share personal items and is cleaning all surfaces that are touched often, like counters, tabletops, and doorknobs using household cleaning sprays or wipes  He is wearing a mask when he leaves his room  Date of symptom onset: 4/9/2021    HPI:  This is a 57-year-old gentleman that presents to the office via virtual video visit using Google duo platform  He is seen for follow-up to COVID-19 infection  He started symptoms on the 8th of April in tested positive on the 9th  He has been feeling much better and has no residual symptoms  No results found for: Nina Mancilla, [address] [facility], [place] [address]  Past Medical History:   Diagnosis Date    Arthritis      No past surgical history on file  Current Outpatient Medications   Medication Sig Dispense Refill    colchicine (COLCRYS) 0 6 mg tablet TWO TABS AT ONSET AND THEN DAILY FOR 4 MORE DAYS AS NEEDED GOUT FLAIR  (NOT COVERED ON INSURANCE) 6 tablet 1    diclofenac (VOLTAREN) 75 mg EC tablet TAKE 1 TABLET BY MOUTH TWICE A DAY 60 tablet 0     No current facility-administered medications for this visit  Allergies   Allergen Reactions    Gabapentin     Zyloprim [Allopurinol] Myalgia       Review of Systems   Constitutional: Negative for chills, fatigue and fever  HENT: Negative for congestion, rhinorrhea and sore throat  Respiratory: Negative for cough, chest tightness and shortness of breath  Gastrointestinal: Negative for abdominal pain, diarrhea, nausea and vomiting  Musculoskeletal: Negative for myalgias  Neurological: Negative for headaches  Objective: There were no vitals filed for this visit  Physical Exam  Constitutional:       General: He is not in acute distress  Appearance: He is well-developed  HENT:      Head: Normocephalic and atraumatic  Eyes:      Conjunctiva/sclera: Conjunctivae normal    Neck:      Musculoskeletal: Normal range of motion  Pulmonary:      Effort: Pulmonary effort is normal    Skin:     Findings: No rash  Neurological:      Mental Status: He is alert and oriented to person, place, and time  VIRTUAL VISIT DISCLAIMER    Matias Ortiz acknowledges that he has consented to an online visit or consultation  He understands that the online visit is based solely on information provided by him, and that, in the absence of a face-to-face physical evaluation by the physician, the diagnosis he receives is both limited and provisional in terms of accuracy and completeness  This is not intended to replace a full medical face-to-face evaluation by the physician  Matias Ortiz understands and accepts these terms

## 2021-04-16 NOTE — LETTER
April 16, 2021     Patient: Haylie Arguello   YOB: 1985   Date of Visit: 4/16/2021       To Whom it May Concern:    Haylie Arguello is under my professional care  He was seen via virtual video visit on 4/16/2021  He has recovered from COVID-19 infection and has been asymptomatic  He will have fulfilled his required quarantine this weekend and may return to work on April 19th, 2021 without restriction  If you have any questions or concerns, please don't hesitate to call           Sincerely,          Tremaine Jimenez PA-C        CC: No Recipients

## 2021-05-07 DIAGNOSIS — M25.539 CHRONIC WRIST PAIN, UNSPECIFIED LATERALITY: ICD-10-CM

## 2021-05-07 DIAGNOSIS — G89.29 CHRONIC WRIST PAIN, UNSPECIFIED LATERALITY: ICD-10-CM

## 2021-05-07 RX ORDER — DICLOFENAC SODIUM 75 MG/1
TABLET, DELAYED RELEASE ORAL
Qty: 60 TABLET | Refills: 0 | Status: SHIPPED | OUTPATIENT
Start: 2021-05-07 | End: 2021-06-03

## 2021-06-03 DIAGNOSIS — G89.29 CHRONIC WRIST PAIN, UNSPECIFIED LATERALITY: ICD-10-CM

## 2021-06-03 DIAGNOSIS — M25.539 CHRONIC WRIST PAIN, UNSPECIFIED LATERALITY: ICD-10-CM

## 2021-06-03 RX ORDER — DICLOFENAC SODIUM 75 MG/1
TABLET, DELAYED RELEASE ORAL
Qty: 60 TABLET | Refills: 0 | Status: SHIPPED | OUTPATIENT
Start: 2021-06-03 | End: 2021-09-11 | Stop reason: SDUPTHER

## 2021-06-12 ENCOUNTER — IMMUNIZATIONS (OUTPATIENT)
Dept: FAMILY MEDICINE CLINIC | Facility: HOSPITAL | Age: 36
End: 2021-06-12

## 2021-06-12 DIAGNOSIS — Z23 ENCOUNTER FOR IMMUNIZATION: Primary | ICD-10-CM

## 2021-06-12 PROCEDURE — 91300 SARS-COV-2 / COVID-19 MRNA VACCINE (PFIZER-BIONTECH) 30 MCG: CPT

## 2021-06-12 PROCEDURE — 0001A SARS-COV-2 / COVID-19 MRNA VACCINE (PFIZER-BIONTECH) 30 MCG: CPT

## 2021-06-18 DIAGNOSIS — M10.022 ACUTE IDIOPATHIC GOUT OF LEFT ELBOW: ICD-10-CM

## 2021-06-18 DIAGNOSIS — M25.421 ELBOW SWELLING, RIGHT: ICD-10-CM

## 2021-06-18 RX ORDER — COLCHICINE 0.6 MG/1
TABLET ORAL
Qty: 6 TABLET | Refills: 0 | Status: SHIPPED | OUTPATIENT
Start: 2021-06-18 | End: 2021-09-11 | Stop reason: SDUPTHER

## 2021-07-13 ENCOUNTER — CLINICAL SUPPORT (OUTPATIENT)
Dept: FAMILY MEDICINE CLINIC | Facility: CLINIC | Age: 36
End: 2021-07-13
Payer: COMMERCIAL

## 2021-07-13 DIAGNOSIS — Z11.1 SCREENING FOR TUBERCULOSIS: Primary | ICD-10-CM

## 2021-07-13 PROCEDURE — 86580 TB INTRADERMAL TEST: CPT

## 2021-07-14 ENCOUNTER — IMMUNIZATIONS (OUTPATIENT)
Dept: FAMILY MEDICINE CLINIC | Facility: HOSPITAL | Age: 36
End: 2021-07-14

## 2021-07-14 DIAGNOSIS — Z23 ENCOUNTER FOR IMMUNIZATION: Primary | ICD-10-CM

## 2021-07-14 PROCEDURE — 91300 SARS-COV-2 / COVID-19 MRNA VACCINE (PFIZER-BIONTECH) 30 MCG: CPT

## 2021-07-14 PROCEDURE — 0002A SARS-COV-2 / COVID-19 MRNA VACCINE (PFIZER-BIONTECH) 30 MCG: CPT

## 2021-07-15 ENCOUNTER — OFFICE VISIT (OUTPATIENT)
Dept: FAMILY MEDICINE CLINIC | Facility: CLINIC | Age: 36
End: 2021-07-15
Payer: COMMERCIAL

## 2021-07-15 VITALS
HEART RATE: 92 BPM | BODY MASS INDEX: 39.17 KG/M2 | HEIGHT: 75 IN | RESPIRATION RATE: 16 BRPM | DIASTOLIC BLOOD PRESSURE: 88 MMHG | TEMPERATURE: 98.5 F | WEIGHT: 315 LBS | SYSTOLIC BLOOD PRESSURE: 118 MMHG

## 2021-07-15 DIAGNOSIS — R73.9 HYPERGLYCEMIA: ICD-10-CM

## 2021-07-15 DIAGNOSIS — M10.022 ACUTE IDIOPATHIC GOUT OF LEFT ELBOW: Primary | ICD-10-CM

## 2021-07-15 DIAGNOSIS — Z00.00 ENCOUNTER FOR PHYSICAL EXAMINATION: ICD-10-CM

## 2021-07-15 DIAGNOSIS — E78.2 MIXED HYPERLIPIDEMIA: ICD-10-CM

## 2021-07-15 LAB
INDURATION: 0 MM
TB SKIN TEST: NEGATIVE

## 2021-07-15 PROCEDURE — 99395 PREV VISIT EST AGE 18-39: CPT | Performed by: PHYSICIAN ASSISTANT

## 2021-07-15 NOTE — PATIENT INSTRUCTIONS
Problem List Items Addressed This Visit        Musculoskeletal and Integument    Acute idiopathic gout of left elbow - Primary      Check uric acid level  Relevant Orders    Uric acid       Other    Hyperglycemia       Check fasting CMP and A1c  Last blood work done in 2019 with a fasting sugar of 120  Relevant Orders    Comprehensive metabolic panel    Hemoglobin A1C    Mixed hyperlipidemia      Check fasting lipid panel as last fasting lipid in 2019 showed a triglyceride of 370  Relevant Orders    Lipid Panel with Direct LDL reflex    Encounter for physical examination     PPD read today  Fasting labs ordered  Call with results

## 2021-07-15 NOTE — PROGRESS NOTES
[facility] PR[facility]ARE    NAME: Karon Maguire  AGE: 43 y o  SEX: male  : 1985     DATE: 7/15/2021     Assessment and Plan:     Problem List Items Addressed This Visit        Musculoskeletal and Integument    Acute idiopathic gout of left elbow - Primary      Check uric acid level  Relevant Orders    Uric acid       Other    Hyperglycemia       Check fasting CMP and A1c  Last blood work done in 2019 with a fasting sugar of 120  Relevant Orders    Comprehensive metabolic panel    Hemoglobin A1C    Mixed hyperlipidemia      Check fasting lipid panel as last fasting lipid in 2019 showed a triglyceride of 370  Relevant Orders    Lipid Panel with Direct LDL reflex    Encounter for physical examination     PPD read today  Fasting labs ordered  Call with results  Immunizations and preventive care screenings were discussed with patient today  Appropriate education was printed on patient's after visit summary  Counseling:  · None    BMI Counseling: Body mass index is 40 07 kg/m²  The BMI is above normal  Nutrition recommendations include decreasing portion sizes, encouraging healthy choices of fruits and vegetables, decreasing fast food intake, consuming healthier snacks, limiting drinks that contain sugar, moderation in carbohydrate intake, increasing intake of lean protein, reducing intake of saturated and trans fat and reducing intake of cholesterol  Exercise recommendations include exercising 3-5 times per week  No pharmacotherapy was ordered  No follow-ups on file  Chief Complaint:     Chief Complaint   Patient presents with    Physical Exam     Pt here for annual physical and PPD read      History of Present Illness:     Adult Annual Physical   Patient here for a comprehensive physical exam  The patient reports no problems      Diet and Physical Activity  · Diet/Nutrition: well balanced diet    · Exercise: no formal exercise  Depression Screening  PHQ-9 Depression Screening    PHQ-9:   Frequency of the following problems over the past two weeks:      Little interest or pleasure in doing things: 0 - not at all  Feeling down, depressed, or hopeless: 0 - not at all  PHQ-2 Score: 0       General Health  · Sleep: sleeps well  · Hearing: normal - bilateral   · Vision: no vision problems  · Dental: regular dental visits   Health  · Symptoms include: none     Review of Systems:     Review of Systems   Constitutional: Negative  HENT: Negative  Eyes: Negative  Respiratory: Negative  Cardiovascular: Negative  Gastrointestinal: Negative  Endocrine: Negative  Genitourinary: Negative  Musculoskeletal: Negative  Skin: Negative  Allergic/Immunologic: Negative  Neurological: Negative  Hematological: Negative  Psychiatric/Behavioral: Negative  Past Medical History:     Past Medical History:   Diagnosis Date    Arthritis       Past Surgical History:     History reviewed  No pertinent surgical history     Family History:     Family History   Problem Relation Age of Onset    Hypertension Mother     No Known Problems Father       Social History:     Social History     Socioeconomic History    Marital status: /Civil Union     Spouse name: None    Number of children: None    Years of education: None    Highest education level: None   Occupational History    None   Tobacco Use    Smoking status: Never Smoker    Smokeless tobacco: Never Used   Substance and Sexual Activity    Alcohol use: No    Drug use: Never    Sexual activity: Yes   Other Topics Concern    None   Social History Narrative         Student      Social Determinants of Health     Financial Resource Strain:     Difficulty of Paying Living Expenses:    Food Insecurity:     Worried About Running Out of Food in the Last Year:     [address] in the Last Year: Transportation Needs:     Lack of Transportation (Medical):  Lack of Transportation (Non-Medical):    Physical Activity:     Days of Exercise per Week:     Minutes of Exercise per Session:    Stress:     Feeling of Stress :    Social Connections:     Frequency of Communication with Friends and Family:     Frequency of Social Gatherings with Friends and Family:     Attends Faith Services:     Active Member of Clubs or Organizations:     Attends Club or Organization Meetings:     Marital Status:    Intimate Partner Violence:     Fear of Current or Ex-Partner:     Emotionally Abused:     Physically Abused:     Sexually Abused:       Current Medications:     Current Outpatient Medications   Medication Sig Dispense Refill    colchicine (COLCRYS) 0 6 mg tablet TWO TABS AT ONSET AND THEN DAILY FOR 4 MORE DAYS AS NEEDED GOUT FLAIR  (NOT COVERED ON INSURANCE) 6 tablet 0    diclofenac (VOLTAREN) 75 mg EC tablet TAKE 1 TABLET BY MOUTH TWICE A DAY 60 tablet 0     No current facility-administered medications for this visit  Allergies: Allergies   Allergen Reactions    Gabapentin     Zyloprim [Allopurinol] Myalgia      Physical Exam:     /88 (BP Location: Left arm, Patient Position: Sitting, Cuff Size: Adult)   Pulse 92   Temp 98 5 °F (36 9 °C) (Temporal)   Resp 16   Ht 6' 3" (1 905 m)   Wt (!) 145 kg (320 lb 9 6 oz)   BMI 40 07 kg/m²     Physical Exam  Vitals and nursing note reviewed  Constitutional:       General: He is not in acute distress  Appearance: Normal appearance  He is well-developed  He is not ill-appearing  HENT:      Head: Normocephalic and atraumatic  Right Ear: Hearing, tympanic membrane, ear canal and external ear normal       Left Ear: Hearing, tympanic membrane, ear canal and external ear normal       Nose: Nose normal       Mouth/Throat:      Mouth: Mucous membranes are moist       Pharynx: Oropharynx is clear     Eyes:      General: Lids are normal          Right eye: No discharge  Left eye: No discharge  Extraocular Movements: Extraocular movements intact  Conjunctiva/sclera: Conjunctivae normal       Pupils: Pupils are equal, round, and reactive to light  Cardiovascular:      Rate and Rhythm: Normal rate and regular rhythm  Heart sounds: Normal heart sounds  Pulmonary:      Effort: Pulmonary effort is normal  No respiratory distress  Breath sounds: Normal breath sounds  Abdominal:      General: Bowel sounds are normal       Palpations: Abdomen is soft  Tenderness: There is no abdominal tenderness  Musculoskeletal:      Cervical back: Neck supple  Lymphadenopathy:      Cervical: No cervical adenopathy  Skin:     General: Skin is warm and dry  Neurological:      General: No focal deficit present  Mental Status: He is alert  Motor: Motor function is intact  Coordination: Coordination is intact  Deep Tendon Reflexes: Reflexes are normal and symmetric  Psychiatric:         Mood and Affect: Mood normal          Behavior: Behavior normal  Behavior is cooperative  Thought Content: Thought content normal          Judgment: Judgment normal           BMI Counseling: Body mass index is 40 07 kg/m²  The BMI is above normal  Nutrition recommendations include reducing portion sizes, decreasing overall calorie intake, 3-5 servings of fruits/vegetables daily, reducing fast food intake, consuming healthier snacks, decreasing soda and/or juice intake, moderation in carbohydrate intake, increasing intake of lean protein, reducing intake of saturated fat and trans fat and reducing intake of cholesterol      Nathan Farmer PA-C  [facility] PRIMARY CARE

## 2021-09-07 ENCOUNTER — APPOINTMENT (OUTPATIENT)
Dept: LAB | Facility: CLINIC | Age: 36
End: 2021-09-07
Payer: COMMERCIAL

## 2021-09-07 DIAGNOSIS — M10.022 ACUTE IDIOPATHIC GOUT OF LEFT ELBOW: ICD-10-CM

## 2021-09-07 LAB
ALBUMIN SERPL BCP-MCNC: 3.8 G/DL (ref 3.5–5)
ALP SERPL-CCNC: 151 U/L (ref 46–116)
ALT SERPL W P-5'-P-CCNC: 56 U/L (ref 12–78)
ANION GAP SERPL CALCULATED.3IONS-SCNC: 5 MMOL/L (ref 4–13)
AST SERPL W P-5'-P-CCNC: 27 U/L (ref 5–45)
BILIRUB SERPL-MCNC: 0.48 MG/DL (ref 0.2–1)
BUN SERPL-MCNC: 12 MG/DL (ref 5–25)
CALCIUM SERPL-MCNC: 9.2 MG/DL (ref 8.3–10.1)
CHLORIDE SERPL-SCNC: 111 MMOL/L (ref 100–108)
CHOLEST SERPL-MCNC: 203 MG/DL (ref 50–200)
CO2 SERPL-SCNC: 24 MMOL/L (ref 21–32)
CREAT SERPL-MCNC: 1.17 MG/DL (ref 0.6–1.3)
GFR SERPL CREATININE-BSD FRML MDRD: 79 ML/MIN/1.73SQ M
GLUCOSE P FAST SERPL-MCNC: 94 MG/DL (ref 65–99)
HDLC SERPL-MCNC: 30 MG/DL
LDLC SERPL CALC-MCNC: 107 MG/DL (ref 0–100)
POTASSIUM SERPL-SCNC: 4 MMOL/L (ref 3.5–5.3)
PROT SERPL-MCNC: 8.2 G/DL (ref 6.4–8.2)
SODIUM SERPL-SCNC: 140 MMOL/L (ref 136–145)
TRIGL SERPL-MCNC: 329 MG/DL
URATE SERPL-MCNC: 11.6 MG/DL (ref 4.2–8)

## 2021-09-07 PROCEDURE — 80053 COMPREHEN METABOLIC PANEL: CPT | Performed by: PHYSICIAN ASSISTANT

## 2021-09-07 PROCEDURE — 84550 ASSAY OF BLOOD/URIC ACID: CPT

## 2021-09-07 PROCEDURE — 83036 HEMOGLOBIN GLYCOSYLATED A1C: CPT | Performed by: PHYSICIAN ASSISTANT

## 2021-09-07 PROCEDURE — 80061 LIPID PANEL: CPT | Performed by: PHYSICIAN ASSISTANT

## 2021-09-07 PROCEDURE — 36415 COLL VENOUS BLD VENIPUNCTURE: CPT | Performed by: PHYSICIAN ASSISTANT

## 2021-09-07 NOTE — RESULT ENCOUNTER NOTE
Please let pt know that his trigs are still very elevated in the 300's and his uric acid is elevated in the 11's  He is to make sure he keeps his apt with Mir Partida to review  Glucose was normal this time

## 2021-09-08 LAB
EST. AVERAGE GLUCOSE BLD GHB EST-MCNC: 111 MG/DL
HBA1C MFR BLD: 5.5 %

## 2021-09-11 DIAGNOSIS — G89.29 CHRONIC WRIST PAIN, UNSPECIFIED LATERALITY: ICD-10-CM

## 2021-09-11 DIAGNOSIS — M25.421 ELBOW SWELLING, RIGHT: ICD-10-CM

## 2021-09-11 DIAGNOSIS — M25.539 CHRONIC WRIST PAIN, UNSPECIFIED LATERALITY: ICD-10-CM

## 2021-09-11 DIAGNOSIS — M10.022 ACUTE IDIOPATHIC GOUT OF LEFT ELBOW: ICD-10-CM

## 2021-09-12 RX ORDER — DICLOFENAC SODIUM 75 MG/1
75 TABLET, DELAYED RELEASE ORAL 2 TIMES DAILY
Qty: 60 TABLET | Refills: 0 | Status: SHIPPED | OUTPATIENT
Start: 2021-09-12 | End: 2021-09-24 | Stop reason: SDUPTHER

## 2021-09-12 RX ORDER — COLCHICINE 0.6 MG/1
TABLET ORAL
Qty: 6 TABLET | Refills: 0 | Status: SHIPPED | OUTPATIENT
Start: 2021-09-12 | End: 2021-09-24 | Stop reason: SDUPTHER

## 2021-09-24 DIAGNOSIS — M25.539 CHRONIC WRIST PAIN, UNSPECIFIED LATERALITY: ICD-10-CM

## 2021-09-24 DIAGNOSIS — M10.022 ACUTE IDIOPATHIC GOUT OF LEFT ELBOW: ICD-10-CM

## 2021-09-24 DIAGNOSIS — G89.29 CHRONIC WRIST PAIN, UNSPECIFIED LATERALITY: ICD-10-CM

## 2021-09-24 DIAGNOSIS — M25.421 ELBOW SWELLING, RIGHT: ICD-10-CM

## 2021-09-24 RX ORDER — DICLOFENAC SODIUM 75 MG/1
75 TABLET, DELAYED RELEASE ORAL 2 TIMES DAILY
Qty: 60 TABLET | Refills: 0 | Status: SHIPPED | OUTPATIENT
Start: 2021-09-24 | End: 2021-11-09

## 2021-09-24 RX ORDER — COLCHICINE 0.6 MG/1
TABLET ORAL
Qty: 6 TABLET | Refills: 0 | Status: SHIPPED | OUTPATIENT
Start: 2021-09-24 | End: 2021-10-15 | Stop reason: SDUPTHER

## 2021-10-05 ENCOUNTER — OFFICE VISIT (OUTPATIENT)
Dept: FAMILY MEDICINE CLINIC | Facility: CLINIC | Age: 36
End: 2021-10-05
Payer: COMMERCIAL

## 2021-10-05 VITALS
HEIGHT: 75 IN | DIASTOLIC BLOOD PRESSURE: 76 MMHG | RESPIRATION RATE: 18 BRPM | HEART RATE: 66 BPM | BODY MASS INDEX: 38.92 KG/M2 | SYSTOLIC BLOOD PRESSURE: 122 MMHG | OXYGEN SATURATION: 98 % | WEIGHT: 313 LBS

## 2021-10-05 DIAGNOSIS — Z00.00 HEALTHCARE MAINTENANCE: Primary | ICD-10-CM

## 2021-10-05 PROCEDURE — 3008F BODY MASS INDEX DOCD: CPT | Performed by: PHYSICIAN ASSISTANT

## 2021-10-05 PROCEDURE — 1036F TOBACCO NON-USER: CPT | Performed by: PHYSICIAN ASSISTANT

## 2021-10-05 PROCEDURE — 99395 PREV VISIT EST AGE 18-39: CPT | Performed by: PHYSICIAN ASSISTANT

## 2021-10-15 DIAGNOSIS — M10.022 ACUTE IDIOPATHIC GOUT OF LEFT ELBOW: ICD-10-CM

## 2021-10-15 DIAGNOSIS — M25.421 ELBOW SWELLING, RIGHT: ICD-10-CM

## 2021-10-15 RX ORDER — COLCHICINE 0.6 MG/1
TABLET ORAL
Qty: 6 TABLET | Refills: 0 | Status: SHIPPED | OUTPATIENT
Start: 2021-10-15 | End: 2021-11-11 | Stop reason: SDUPTHER

## 2021-10-22 ENCOUNTER — TELEPHONE (OUTPATIENT)
Dept: FAMILY MEDICINE CLINIC | Facility: CLINIC | Age: 36
End: 2021-10-22

## 2021-11-08 DIAGNOSIS — M25.539 CHRONIC WRIST PAIN, UNSPECIFIED LATERALITY: ICD-10-CM

## 2021-11-08 DIAGNOSIS — G89.29 CHRONIC WRIST PAIN, UNSPECIFIED LATERALITY: ICD-10-CM

## 2021-11-09 RX ORDER — DICLOFENAC SODIUM 75 MG/1
TABLET, DELAYED RELEASE ORAL
Qty: 60 TABLET | Refills: 0 | Status: SHIPPED | OUTPATIENT
Start: 2021-11-09 | End: 2021-12-06

## 2021-11-11 DIAGNOSIS — M25.421 ELBOW SWELLING, RIGHT: ICD-10-CM

## 2021-11-11 DIAGNOSIS — M10.022 ACUTE IDIOPATHIC GOUT OF LEFT ELBOW: ICD-10-CM

## 2021-11-12 RX ORDER — COLCHICINE 0.6 MG/1
TABLET ORAL
Qty: 6 TABLET | Refills: 0 | Status: SHIPPED | OUTPATIENT
Start: 2021-11-12 | End: 2021-12-06 | Stop reason: SDUPTHER

## 2021-11-19 ENCOUNTER — DOCUMENTATION (OUTPATIENT)
Dept: FAMILY MEDICINE CLINIC | Facility: CLINIC | Age: 36
End: 2021-11-19

## 2021-12-06 DIAGNOSIS — M25.421 ELBOW SWELLING, RIGHT: ICD-10-CM

## 2021-12-06 DIAGNOSIS — G89.29 CHRONIC WRIST PAIN, UNSPECIFIED LATERALITY: ICD-10-CM

## 2021-12-06 DIAGNOSIS — M25.539 CHRONIC WRIST PAIN, UNSPECIFIED LATERALITY: ICD-10-CM

## 2021-12-06 DIAGNOSIS — M10.022 ACUTE IDIOPATHIC GOUT OF LEFT ELBOW: ICD-10-CM

## 2021-12-06 RX ORDER — DICLOFENAC SODIUM 75 MG/1
TABLET, DELAYED RELEASE ORAL
Qty: 60 TABLET | Refills: 0 | Status: SHIPPED | OUTPATIENT
Start: 2021-12-06 | End: 2022-01-07

## 2021-12-07 ENCOUNTER — TELEPHONE (OUTPATIENT)
Dept: FAMILY MEDICINE CLINIC | Facility: CLINIC | Age: 36
End: 2021-12-07

## 2021-12-07 RX ORDER — COLCHICINE 0.6 MG/1
TABLET ORAL
Qty: 6 TABLET | Refills: 0 | Status: SHIPPED | OUTPATIENT
Start: 2021-12-07 | End: 2021-12-26 | Stop reason: SDUPTHER

## 2021-12-26 DIAGNOSIS — M25.421 ELBOW SWELLING, RIGHT: ICD-10-CM

## 2021-12-26 DIAGNOSIS — M10.022 ACUTE IDIOPATHIC GOUT OF LEFT ELBOW: ICD-10-CM

## 2021-12-27 ENCOUNTER — TELEPHONE (OUTPATIENT)
Dept: FAMILY MEDICINE CLINIC | Facility: CLINIC | Age: 36
End: 2021-12-27

## 2021-12-27 DIAGNOSIS — M10.022 ACUTE IDIOPATHIC GOUT OF LEFT ELBOW: ICD-10-CM

## 2021-12-27 DIAGNOSIS — M25.421 ELBOW SWELLING, RIGHT: ICD-10-CM

## 2021-12-28 RX ORDER — COLCHICINE 0.6 MG/1
TABLET ORAL
Qty: 6 TABLET | Refills: 0 | Status: SHIPPED | OUTPATIENT
Start: 2021-12-28 | End: 2021-12-28

## 2021-12-28 RX ORDER — COLCHICINE 0.6 MG/1
TABLET ORAL
Qty: 6 TABLET | Refills: 0 | Status: SHIPPED | OUTPATIENT
Start: 2021-12-28 | End: 2022-01-12 | Stop reason: SDUPTHER

## 2022-01-07 DIAGNOSIS — G89.29 CHRONIC WRIST PAIN, UNSPECIFIED LATERALITY: ICD-10-CM

## 2022-01-07 DIAGNOSIS — M25.539 CHRONIC WRIST PAIN, UNSPECIFIED LATERALITY: ICD-10-CM

## 2022-01-07 RX ORDER — DICLOFENAC SODIUM 75 MG/1
TABLET, DELAYED RELEASE ORAL
Qty: 60 TABLET | Refills: 0 | Status: SHIPPED | OUTPATIENT
Start: 2022-01-07 | End: 2022-03-12 | Stop reason: SDUPTHER

## 2022-01-12 DIAGNOSIS — M25.421 ELBOW SWELLING, RIGHT: ICD-10-CM

## 2022-01-12 DIAGNOSIS — M10.022 ACUTE IDIOPATHIC GOUT OF LEFT ELBOW: ICD-10-CM

## 2022-01-12 RX ORDER — COLCHICINE 0.6 MG/1
TABLET ORAL
Qty: 6 TABLET | Refills: 0 | Status: SHIPPED | OUTPATIENT
Start: 2022-01-12 | End: 2022-02-06 | Stop reason: SDUPTHER

## 2022-02-06 DIAGNOSIS — M10.022 ACUTE IDIOPATHIC GOUT OF LEFT ELBOW: ICD-10-CM

## 2022-02-06 DIAGNOSIS — M25.421 ELBOW SWELLING, RIGHT: ICD-10-CM

## 2022-02-07 RX ORDER — COLCHICINE 0.6 MG/1
TABLET ORAL
Qty: 6 TABLET | Refills: 0 | Status: SHIPPED | OUTPATIENT
Start: 2022-02-07 | End: 2022-03-12 | Stop reason: SDUPTHER

## 2022-03-12 DIAGNOSIS — G89.29 CHRONIC WRIST PAIN, UNSPECIFIED LATERALITY: ICD-10-CM

## 2022-03-12 DIAGNOSIS — M10.022 ACUTE IDIOPATHIC GOUT OF LEFT ELBOW: ICD-10-CM

## 2022-03-12 DIAGNOSIS — M25.421 ELBOW SWELLING, RIGHT: ICD-10-CM

## 2022-03-12 DIAGNOSIS — M25.539 CHRONIC WRIST PAIN, UNSPECIFIED LATERALITY: ICD-10-CM

## 2022-03-14 RX ORDER — COLCHICINE 0.6 MG/1
TABLET ORAL
Qty: 6 TABLET | Refills: 0 | Status: SHIPPED | OUTPATIENT
Start: 2022-03-14 | End: 2022-04-13 | Stop reason: SDUPTHER

## 2022-03-14 RX ORDER — DICLOFENAC SODIUM 75 MG/1
75 TABLET, DELAYED RELEASE ORAL 2 TIMES DAILY
Qty: 60 TABLET | Refills: 0 | Status: SHIPPED | OUTPATIENT
Start: 2022-03-14 | End: 2022-04-07

## 2022-03-14 NOTE — TELEPHONE ENCOUNTER
Requested Prescriptions     Pending Prescriptions Disp Refills    diclofenac (VOLTAREN) 75 mg EC tablet 60 tablet 0     Sig: Take 1 tablet (75 mg total) by mouth 2 (two) times a day    colchicine (COLCRYS) 0 6 mg tablet 6 tablet 0     Sig: TWO TABS AT ONSET AND THEN DAILY FOR 4 MORE DAYS AS NEEDED GOUT FLAIR  (NOT COVERED ON INSURANCE)     LOV 10/5/21, F/U Cancelled, Labs completed

## 2022-04-07 DIAGNOSIS — G89.29 CHRONIC WRIST PAIN, UNSPECIFIED LATERALITY: ICD-10-CM

## 2022-04-07 DIAGNOSIS — M25.539 CHRONIC WRIST PAIN, UNSPECIFIED LATERALITY: ICD-10-CM

## 2022-04-07 RX ORDER — DICLOFENAC SODIUM 75 MG/1
TABLET, DELAYED RELEASE ORAL
Qty: 60 TABLET | Refills: 0 | Status: SHIPPED | OUTPATIENT
Start: 2022-04-07 | End: 2022-04-13 | Stop reason: SDUPTHER

## 2022-04-13 DIAGNOSIS — M10.022 ACUTE IDIOPATHIC GOUT OF LEFT ELBOW: ICD-10-CM

## 2022-04-13 DIAGNOSIS — M25.539 CHRONIC WRIST PAIN, UNSPECIFIED LATERALITY: ICD-10-CM

## 2022-04-13 DIAGNOSIS — M25.421 ELBOW SWELLING, RIGHT: ICD-10-CM

## 2022-04-13 DIAGNOSIS — G89.29 CHRONIC WRIST PAIN, UNSPECIFIED LATERALITY: ICD-10-CM

## 2022-04-14 RX ORDER — COLCHICINE 0.6 MG/1
TABLET ORAL
Qty: 6 TABLET | Refills: 0 | Status: SHIPPED | OUTPATIENT
Start: 2022-04-14 | End: 2022-06-01 | Stop reason: SDUPTHER

## 2022-04-14 RX ORDER — DICLOFENAC SODIUM 75 MG/1
75 TABLET, DELAYED RELEASE ORAL 2 TIMES DAILY
Qty: 60 TABLET | Refills: 0 | Status: SHIPPED | OUTPATIENT
Start: 2022-04-14 | End: 2022-08-04 | Stop reason: SDUPTHER

## 2022-05-30 DIAGNOSIS — M10.022 ACUTE IDIOPATHIC GOUT OF LEFT ELBOW: ICD-10-CM

## 2022-05-30 DIAGNOSIS — M25.421 ELBOW SWELLING, RIGHT: ICD-10-CM

## 2022-05-31 RX ORDER — COLCHICINE 0.6 MG/1
TABLET ORAL
Qty: 6 TABLET | Refills: 0 | OUTPATIENT
Start: 2022-05-31

## 2022-06-01 RX ORDER — COLCHICINE 0.6 MG/1
TABLET ORAL
Qty: 6 TABLET | Refills: 0 | Status: SHIPPED | OUTPATIENT
Start: 2022-06-01 | End: 2022-06-19 | Stop reason: SDUPTHER

## 2022-06-19 DIAGNOSIS — M25.421 ELBOW SWELLING, RIGHT: ICD-10-CM

## 2022-06-19 DIAGNOSIS — M10.022 ACUTE IDIOPATHIC GOUT OF LEFT ELBOW: ICD-10-CM

## 2022-06-20 RX ORDER — COLCHICINE 0.6 MG/1
TABLET ORAL
Qty: 6 TABLET | Refills: 0 | Status: SHIPPED | OUTPATIENT
Start: 2022-06-20 | End: 2022-07-02 | Stop reason: SDUPTHER

## 2022-07-02 DIAGNOSIS — M10.022 ACUTE IDIOPATHIC GOUT OF LEFT ELBOW: ICD-10-CM

## 2022-07-02 DIAGNOSIS — M25.421 ELBOW SWELLING, RIGHT: ICD-10-CM

## 2022-07-05 RX ORDER — COLCHICINE 0.6 MG/1
TABLET ORAL
Qty: 6 TABLET | Refills: 0 | Status: SHIPPED | OUTPATIENT
Start: 2022-07-05 | End: 2022-08-04 | Stop reason: SDUPTHER

## 2022-08-03 DIAGNOSIS — M25.421 ELBOW SWELLING, RIGHT: ICD-10-CM

## 2022-08-03 DIAGNOSIS — G89.29 CHRONIC WRIST PAIN, UNSPECIFIED LATERALITY: ICD-10-CM

## 2022-08-03 DIAGNOSIS — M25.539 CHRONIC WRIST PAIN, UNSPECIFIED LATERALITY: ICD-10-CM

## 2022-08-03 DIAGNOSIS — M10.022 ACUTE IDIOPATHIC GOUT OF LEFT ELBOW: ICD-10-CM

## 2022-08-04 RX ORDER — DICLOFENAC SODIUM 75 MG/1
75 TABLET, DELAYED RELEASE ORAL 2 TIMES DAILY
Qty: 60 TABLET | Refills: 0 | OUTPATIENT
Start: 2022-08-04

## 2022-08-04 RX ORDER — COLCHICINE 0.6 MG/1
TABLET ORAL
Qty: 6 TABLET | Refills: 0 | OUTPATIENT
Start: 2022-08-04

## 2022-08-04 RX ORDER — DICLOFENAC SODIUM 75 MG/1
75 TABLET, DELAYED RELEASE ORAL 2 TIMES DAILY
Qty: 60 TABLET | Refills: 1 | Status: SHIPPED | OUTPATIENT
Start: 2022-08-04 | End: 2022-10-06

## 2022-08-04 RX ORDER — COLCHICINE 0.6 MG/1
TABLET ORAL
Qty: 30 TABLET | Refills: 1 | Status: SHIPPED | OUTPATIENT
Start: 2022-08-04 | End: 2022-08-05 | Stop reason: SDUPTHER

## 2022-08-04 NOTE — TELEPHONE ENCOUNTER
He was seen in October for yearly physical   I will send in refills until he can schedule appointment

## 2022-08-05 ENCOUNTER — TELEPHONE (OUTPATIENT)
Dept: FAMILY MEDICINE CLINIC | Facility: CLINIC | Age: 37
End: 2022-08-05

## 2022-08-05 DIAGNOSIS — M10.022 ACUTE IDIOPATHIC GOUT OF LEFT ELBOW: ICD-10-CM

## 2022-08-05 DIAGNOSIS — M25.421 ELBOW SWELLING, RIGHT: ICD-10-CM

## 2022-08-05 RX ORDER — COLCHICINE 0.6 MG/1
TABLET ORAL
Qty: 6 TABLET | Refills: 5 | Status: SHIPPED | OUTPATIENT
Start: 2022-08-05

## 2022-08-05 NOTE — TELEPHONE ENCOUNTER
----- Message from [place], [address] [place] sent at 8/4/2022  3:28 PM EDT -----  Regarding: FW: Colchicine Prescription    ----- Message -----  From: Ana Zaragoza  Sent: 8/4/2022   3:27 PM EDT  To: [facility]  Subject: Colchicine Prescription                          The pharmacy informed me that the insurance will not cover the written prescription for 30 pills  Can you please re-write the prescription for 6 pills?

## 2022-10-06 DIAGNOSIS — G89.29 CHRONIC WRIST PAIN, UNSPECIFIED LATERALITY: ICD-10-CM

## 2022-10-06 DIAGNOSIS — M25.539 CHRONIC WRIST PAIN, UNSPECIFIED LATERALITY: ICD-10-CM

## 2022-10-06 RX ORDER — DICLOFENAC SODIUM 75 MG/1
TABLET, DELAYED RELEASE ORAL
Qty: 60 TABLET | Refills: 1 | Status: SHIPPED | OUTPATIENT
Start: 2022-10-06

## 2022-10-30 DIAGNOSIS — M25.421 ELBOW SWELLING, RIGHT: ICD-10-CM

## 2022-10-30 DIAGNOSIS — M10.022 ACUTE IDIOPATHIC GOUT OF LEFT ELBOW: ICD-10-CM

## 2022-10-31 RX ORDER — COLCHICINE 0.6 MG/1
TABLET ORAL
Qty: 6 TABLET | Refills: 0 | Status: SHIPPED | OUTPATIENT
Start: 2022-10-31

## 2022-10-31 NOTE — TELEPHONE ENCOUNTER
Requested Prescriptions     Pending Prescriptions Disp Refills   • colchicine (COLCRYS) 0 6 mg tablet 6 tablet 0     Sig: TWO TABS AT ONSET AND THEN DAILY FOR 4 MORE DAYS AS NEEDED GOUT FLAIR  (NOT COVERED ON INSURANCE)     LOV 10/5/21, F/U 11/7/22, Labs completed

## 2022-11-07 ENCOUNTER — OFFICE VISIT (OUTPATIENT)
Dept: FAMILY MEDICINE CLINIC | Facility: CLINIC | Age: 37
End: 2022-11-07

## 2022-11-07 VITALS
OXYGEN SATURATION: 98 % | WEIGHT: 315 LBS | HEART RATE: 75 BPM | HEIGHT: 75 IN | DIASTOLIC BLOOD PRESSURE: 80 MMHG | SYSTOLIC BLOOD PRESSURE: 124 MMHG | BODY MASS INDEX: 39.17 KG/M2

## 2022-11-07 DIAGNOSIS — R73.9 HYPERGLYCEMIA: ICD-10-CM

## 2022-11-07 DIAGNOSIS — E78.2 MIXED HYPERLIPIDEMIA: Primary | ICD-10-CM

## 2022-11-08 NOTE — PATIENT INSTRUCTIONS
Assessment/plan:   Healthcare maintenance-healthy appearing 57-year-old gentleman  He did have some blood work recently in the urgent care setting  I would recommend assessing lipids, thyroid, and A1c as well  Will start with colon cancer and prostate cancer screening at age 37  Continue healthy diet and exercise  He is currently working as a   and has been happy with his career change  Flu vaccine declined tonight

## 2022-11-08 NOTE — PROGRESS NOTES
Name: Blanquita Chacon      : 1985      MRN: [id number]  Encounter Provider: Rajeev Feliz PA-C  Encounter Date: 2022   Encounter department: [address] [place] [address] PRIMARY CARE    Assessment & Plan     Patient Instructions     Assessment/plan:   Healthcare maintenance-healthy appearing 20-year-old gentleman  He did have some blood work recently in the urgent care setting  I would recommend assessing lipids, thyroid, and A1c as well  Will start with colon cancer and prostate cancer screening at age 37  Continue healthy diet and exercise  He is currently working as a   and has been happy with his career change  Flu vaccine declined tonight  1  Mixed hyperlipidemia  -     TSH, 3rd generation with Free T4 reflex  -     Lipid Panel with Direct LDL reflex  -     Hemoglobin A1C    2  Hyperglycemia  -     TSH, 3rd generation with Free T4 reflex  -     Lipid Panel with Direct LDL reflex  -     Hemoglobin A1C    BMI Counseling: Body mass index is 39 95 kg/m²  The BMI is above normal  Nutrition recommendations include decreasing portion sizes  Exercise recommendations include exercising 3-5 times per week  Rationale for BMI follow-up plan is due to patient being overweight or obese  Depression Screening and Follow-up Plan: Patient was screened for depression during today's encounter  They screened negative with a PHQ-2 score of 0  Subjective        HPI:  This is a 20-year-old gentleman that presents to the office for  Annual wellness checkup  He has been feeling well for the most part and has started a new career, teaching   He has been very happy with his change so far  He has also recently been seen in the urgent care setting for some arm pain on the left side which seems to be improving  He does not recall any injury to the area but had sudden pain and spasm in his biceps area    He does have a history of gout and had some labs which showed his uric acid level was at 9 0  He has previously tried allopurinol but unfortunately had some side effects of muscle aches with it  Review of Systems   Constitutional: Negative for chills, fatigue and fever  HENT: Negative for congestion, ear pain and sinus pressure  Eyes: Negative for visual disturbance  Respiratory: Negative for cough, chest tightness and shortness of breath  Cardiovascular: Negative for chest pain and palpitations  Gastrointestinal: Negative for diarrhea, nausea and vomiting  Endocrine: Negative for polyuria  Genitourinary: Negative for dysuria and frequency  Musculoskeletal: Negative for arthralgias and myalgias  Skin: Negative for pallor and rash  Neurological: Negative for dizziness, weakness, light-headedness, numbness and headaches  Psychiatric/Behavioral: Negative for agitation, behavioral problems and sleep disturbance  All other systems reviewed and are negative  Current Outpatient Medications on File Prior to Visit   Medication Sig   • colchicine (COLCRYS) 0 6 mg tablet TWO TABS AT ONSET AND THEN DAILY FOR 4 MORE DAYS AS NEEDED GOUT FLAIR  (NOT COVERED ON INSURANCE)   • diclofenac (VOLTAREN) 75 mg EC tablet TAKE 1 TABLET BY MOUTH TWICE A DAY       Objective     /80 (BP Location: Right arm, Patient Position: Sitting)   Pulse 75   Ht 6' 3" (1 905 m)   Wt (!) 145 kg (319 lb 9 6 oz)   SpO2 98%   BMI 39 95 kg/m²     Physical Exam  Vitals and nursing note reviewed  Constitutional:       General: He is not in acute distress  Appearance: He is well-developed  HENT:      Head: Normocephalic and atraumatic  Right Ear: External ear normal       Left Ear: External ear normal       Nose: Nose normal       Mouth/Throat:      Pharynx: No oropharyngeal exudate  Eyes:      Conjunctiva/sclera: Conjunctivae normal       Pupils: Pupils are equal, round, and reactive to light  Neck:      Thyroid: No thyromegaly  Trachea: No tracheal deviation  Cardiovascular:      Rate and Rhythm: Normal rate and regular rhythm  Heart sounds: Normal heart sounds  No murmur heard  No friction rub  Pulmonary:      Effort: Pulmonary effort is normal  No respiratory distress  Breath sounds: Normal breath sounds  No wheezing or rales  Abdominal:      General: Bowel sounds are normal  There is no distension  Palpations: Abdomen is soft  Tenderness: There is no abdominal tenderness  There is no guarding or rebound  Musculoskeletal:         General: No tenderness  Normal range of motion  Cervical back: Normal range of motion and neck supple  Lymphadenopathy:      Cervical: No cervical adenopathy  Skin:     General: Skin is warm and dry  Findings: No erythema or rash  Neurological:      Mental Status: He is alert and oriented to person, place, and time  Cranial Nerves: No cranial nerve deficit  Coordination: Coordination normal    Psychiatric:         Behavior: Behavior normal          Thought Content:  Thought content normal        Jenn March PA-C

## 2022-11-13 DIAGNOSIS — M25.421 ELBOW SWELLING, RIGHT: ICD-10-CM

## 2022-11-13 DIAGNOSIS — M10.022 ACUTE IDIOPATHIC GOUT OF LEFT ELBOW: ICD-10-CM

## 2022-11-14 RX ORDER — COLCHICINE 0.6 MG/1
TABLET ORAL
Qty: 6 TABLET | Refills: 0 | Status: SHIPPED | OUTPATIENT
Start: 2022-11-14

## 2022-11-14 NOTE — TELEPHONE ENCOUNTER
Requested Prescriptions     Pending Prescriptions Disp Refills   • colchicine (COLCRYS) 0 6 mg tablet 6 tablet 0     Sig: TWO TABS AT ONSET AND THEN DAILY FOR 4 MORE DAYS AS NEEDED GOUT FLAIR  (NOT COVERED ON INSURANCE)     LOV 11/7/22, F/U Non schedule, Labs active

## 2022-12-04 DIAGNOSIS — M10.022 ACUTE IDIOPATHIC GOUT OF LEFT ELBOW: ICD-10-CM

## 2022-12-04 DIAGNOSIS — M25.421 ELBOW SWELLING, RIGHT: ICD-10-CM

## 2022-12-05 RX ORDER — COLCHICINE 0.6 MG/1
TABLET ORAL
Qty: 6 TABLET | Refills: 0 | Status: SHIPPED | OUTPATIENT
Start: 2022-12-05

## 2022-12-20 NOTE — ASSESSMENT & PLAN NOTE
----- Message from Juan Luis Blanc sent at 12/19/2022  2:33 PM EST -----  Subject: Referral Request    Reason for referral request? Pt would like a referral for a different   Orthopedic Surgeion as the last one does not do the procedure pt needs   done for left pinky issues   Provider patient wants to be referred to(if known):     Provider Phone Number(if known):     Additional Information for Provider?   ---------------------------------------------------------------------------  --------------  [id number]    [phone]; OK to leave message on voicemail  ---------------------------------------------------------------------------  -------------- The patient was placed on prednisone 10 mg tablets:  Five tablets for 2 days, 4 tablets for 3 days, 3 tablets for 3 days, 2 tablets for 2 days and 1 tablet for 1 day

## 2022-12-25 DIAGNOSIS — M25.539 CHRONIC WRIST PAIN, UNSPECIFIED LATERALITY: ICD-10-CM

## 2022-12-25 DIAGNOSIS — G89.29 CHRONIC WRIST PAIN, UNSPECIFIED LATERALITY: ICD-10-CM

## 2022-12-25 DIAGNOSIS — M25.421 ELBOW SWELLING, RIGHT: ICD-10-CM

## 2022-12-25 DIAGNOSIS — M10.022 ACUTE IDIOPATHIC GOUT OF LEFT ELBOW: ICD-10-CM

## 2022-12-27 ENCOUNTER — TELEPHONE (OUTPATIENT)
Dept: FAMILY MEDICINE CLINIC | Facility: CLINIC | Age: 37
End: 2022-12-27

## 2022-12-27 DIAGNOSIS — M25.421 ELBOW SWELLING, RIGHT: ICD-10-CM

## 2022-12-27 DIAGNOSIS — M10.022 ACUTE IDIOPATHIC GOUT OF LEFT ELBOW: ICD-10-CM

## 2022-12-27 DIAGNOSIS — G89.29 CHRONIC WRIST PAIN, UNSPECIFIED LATERALITY: ICD-10-CM

## 2022-12-27 DIAGNOSIS — M25.539 CHRONIC WRIST PAIN, UNSPECIFIED LATERALITY: ICD-10-CM

## 2022-12-27 RX ORDER — COLCHICINE 0.6 MG/1
TABLET ORAL
Qty: 6 TABLET | Refills: 0 | Status: SHIPPED | OUTPATIENT
Start: 2022-12-27 | End: 2022-12-28 | Stop reason: SDUPTHER

## 2022-12-27 RX ORDER — DICLOFENAC SODIUM 75 MG/1
75 TABLET, DELAYED RELEASE ORAL 2 TIMES DAILY
Qty: 60 TABLET | Refills: 0 | Status: SHIPPED | OUTPATIENT
Start: 2022-12-27 | End: 2022-12-28 | Stop reason: SDUPTHER

## 2022-12-28 RX ORDER — DICLOFENAC SODIUM 75 MG/1
75 TABLET, DELAYED RELEASE ORAL 2 TIMES DAILY
Qty: 60 TABLET | Refills: 0 | Status: SHIPPED | OUTPATIENT
Start: 2022-12-28

## 2022-12-28 RX ORDER — COLCHICINE 0.6 MG/1
TABLET ORAL
Qty: 6 TABLET | Refills: 0 | Status: SHIPPED | OUTPATIENT
Start: 2022-12-28

## 2022-12-28 NOTE — TELEPHONE ENCOUNTER
I have sent to [address] [place] [address] in [place]  I believe he is aware that insurance does not cover and that is why he usually just gets 6 tablets at a time and pays out-of-pocket  He can also use coupons from good [employer] website 
Please send two meds requested to [facility] [address] in [place]  They went to [facility] and he states that that is not his pharmacy anymore 
Rajat, I was going to resend medication but colochicine is not covered on his insurance was he aware of this or can you send alternative med for him?
Parent

## 2023-01-19 ENCOUNTER — PATIENT MESSAGE (OUTPATIENT)
Dept: FAMILY MEDICINE CLINIC | Facility: CLINIC | Age: 38
End: 2023-01-19

## 2023-01-19 DIAGNOSIS — M10.022 ACUTE IDIOPATHIC GOUT OF LEFT ELBOW: ICD-10-CM

## 2023-01-19 DIAGNOSIS — M25.539 CHRONIC WRIST PAIN, UNSPECIFIED LATERALITY: ICD-10-CM

## 2023-01-19 DIAGNOSIS — G89.29 CHRONIC WRIST PAIN, UNSPECIFIED LATERALITY: ICD-10-CM

## 2023-01-19 DIAGNOSIS — M25.421 ELBOW SWELLING, RIGHT: ICD-10-CM

## 2023-01-19 RX ORDER — DICLOFENAC SODIUM 75 MG/1
75 TABLET, DELAYED RELEASE ORAL 2 TIMES DAILY
Qty: 60 TABLET | Refills: 0 | Status: SHIPPED | OUTPATIENT
Start: 2023-01-19

## 2023-01-19 RX ORDER — COLCHICINE 0.6 MG/1
TABLET ORAL
Qty: 6 TABLET | Refills: 0 | Status: SHIPPED | OUTPATIENT
Start: 2023-01-19

## 2023-02-04 DIAGNOSIS — M10.022 ACUTE IDIOPATHIC GOUT OF LEFT ELBOW: ICD-10-CM

## 2023-02-04 DIAGNOSIS — M25.421 ELBOW SWELLING, RIGHT: ICD-10-CM

## 2023-02-04 RX ORDER — COLCHICINE 0.6 MG/1
TABLET ORAL
Qty: 6 TABLET | Refills: 0 | Status: SHIPPED | OUTPATIENT
Start: 2023-02-04

## 2023-02-24 DIAGNOSIS — M25.421 ELBOW SWELLING, RIGHT: ICD-10-CM

## 2023-02-24 DIAGNOSIS — M10.022 ACUTE IDIOPATHIC GOUT OF LEFT ELBOW: ICD-10-CM

## 2023-02-24 RX ORDER — COLCHICINE 0.6 MG/1
TABLET ORAL
Qty: 6 TABLET | Refills: 0 | Status: SHIPPED | OUTPATIENT
Start: 2023-02-24

## 2023-03-08 DIAGNOSIS — M25.421 ELBOW SWELLING, RIGHT: ICD-10-CM

## 2023-03-08 DIAGNOSIS — M10.022 ACUTE IDIOPATHIC GOUT OF LEFT ELBOW: ICD-10-CM

## 2023-03-08 DIAGNOSIS — M25.539 CHRONIC WRIST PAIN, UNSPECIFIED LATERALITY: ICD-10-CM

## 2023-03-08 DIAGNOSIS — G89.29 CHRONIC WRIST PAIN, UNSPECIFIED LATERALITY: ICD-10-CM

## 2023-03-09 RX ORDER — DICLOFENAC SODIUM 75 MG/1
75 TABLET, DELAYED RELEASE ORAL 2 TIMES DAILY
Qty: 60 TABLET | Refills: 0 | Status: SHIPPED | OUTPATIENT
Start: 2023-03-09

## 2023-03-09 RX ORDER — COLCHICINE 0.6 MG/1
TABLET ORAL
Qty: 6 TABLET | Refills: 0 | Status: SHIPPED | OUTPATIENT
Start: 2023-03-09

## 2023-03-23 ENCOUNTER — PATIENT MESSAGE (OUTPATIENT)
Dept: FAMILY MEDICINE CLINIC | Facility: CLINIC | Age: 38
End: 2023-03-23

## 2023-04-24 DIAGNOSIS — M10.022 ACUTE IDIOPATHIC GOUT OF LEFT ELBOW: ICD-10-CM

## 2023-04-24 DIAGNOSIS — G89.29 CHRONIC WRIST PAIN, UNSPECIFIED LATERALITY: ICD-10-CM

## 2023-04-24 DIAGNOSIS — M25.421 ELBOW SWELLING, RIGHT: ICD-10-CM

## 2023-04-24 DIAGNOSIS — M25.539 CHRONIC WRIST PAIN, UNSPECIFIED LATERALITY: ICD-10-CM

## 2023-04-24 RX ORDER — COLCHICINE 0.6 MG/1
TABLET ORAL
Qty: 6 TABLET | Refills: 0 | Status: SHIPPED | OUTPATIENT
Start: 2023-04-24

## 2023-04-24 RX ORDER — DICLOFENAC SODIUM 75 MG/1
75 TABLET, DELAYED RELEASE ORAL 2 TIMES DAILY
Qty: 60 TABLET | Refills: 0 | Status: SHIPPED | OUTPATIENT
Start: 2023-04-24

## 2023-05-08 DIAGNOSIS — M10.022 ACUTE IDIOPATHIC GOUT OF LEFT ELBOW: ICD-10-CM

## 2023-05-08 DIAGNOSIS — M25.421 ELBOW SWELLING, RIGHT: ICD-10-CM

## 2023-05-08 RX ORDER — COLCHICINE 0.6 MG/1
TABLET ORAL
Qty: 6 TABLET | Refills: 0 | Status: SHIPPED | OUTPATIENT
Start: 2023-05-08

## 2023-05-26 ENCOUNTER — TELEPHONE (OUTPATIENT)
Dept: FAMILY MEDICINE CLINIC | Facility: CLINIC | Age: 38
End: 2023-05-26

## 2023-05-26 NOTE — TELEPHONE ENCOUNTER
Pt called as follow-up to message sent by [facility] on [address]  Made appropriate appointment for June 12th, made pt aware to get labs done and that [facility] had sent in prescription to be picked up for this weekend

## 2023-06-08 DIAGNOSIS — M25.539 CHRONIC WRIST PAIN, UNSPECIFIED LATERALITY: ICD-10-CM

## 2023-06-08 DIAGNOSIS — M25.421 ELBOW SWELLING, RIGHT: ICD-10-CM

## 2023-06-08 DIAGNOSIS — G89.29 CHRONIC WRIST PAIN, UNSPECIFIED LATERALITY: ICD-10-CM

## 2023-06-08 DIAGNOSIS — M10.022 ACUTE IDIOPATHIC GOUT OF LEFT ELBOW: ICD-10-CM

## 2023-06-08 RX ORDER — DICLOFENAC SODIUM 75 MG/1
75 TABLET, DELAYED RELEASE ORAL 2 TIMES DAILY
Qty: 60 TABLET | Refills: 0 | Status: SHIPPED | OUTPATIENT
Start: 2023-06-08

## 2023-06-08 RX ORDER — COLCHICINE 0.6 MG/1
TABLET ORAL
Qty: 6 TABLET | Refills: 0 | Status: SHIPPED | OUTPATIENT
Start: 2023-06-08

## 2023-06-12 ENCOUNTER — APPOINTMENT (OUTPATIENT)
Dept: LAB | Facility: CLINIC | Age: 38
End: 2023-06-12
Payer: COMMERCIAL

## 2023-06-12 ENCOUNTER — OFFICE VISIT (OUTPATIENT)
Dept: FAMILY MEDICINE CLINIC | Facility: CLINIC | Age: 38
End: 2023-06-12
Payer: COMMERCIAL

## 2023-06-12 VITALS
SYSTOLIC BLOOD PRESSURE: 98 MMHG | HEART RATE: 68 BPM | HEIGHT: 75 IN | OXYGEN SATURATION: 98 % | DIASTOLIC BLOOD PRESSURE: 60 MMHG | WEIGHT: 310 LBS | BODY MASS INDEX: 38.54 KG/M2

## 2023-06-12 DIAGNOSIS — R73.9 HYPERGLYCEMIA: ICD-10-CM

## 2023-06-12 DIAGNOSIS — M1A.9XX1: ICD-10-CM

## 2023-06-12 DIAGNOSIS — M10.022 ACUTE IDIOPATHIC GOUT OF LEFT ELBOW: ICD-10-CM

## 2023-06-12 DIAGNOSIS — E78.2 MIXED HYPERLIPIDEMIA: ICD-10-CM

## 2023-06-12 DIAGNOSIS — M10.9 GOUT, UNSPECIFIED CAUSE, UNSPECIFIED CHRONICITY, UNSPECIFIED SITE: ICD-10-CM

## 2023-06-12 DIAGNOSIS — M10.9 GOUT, UNSPECIFIED CAUSE, UNSPECIFIED CHRONICITY, UNSPECIFIED SITE: Primary | ICD-10-CM

## 2023-06-12 DIAGNOSIS — M25.421 ELBOW SWELLING, RIGHT: ICD-10-CM

## 2023-06-12 LAB
ALBUMIN SERPL BCP-MCNC: 3.9 G/DL (ref 3.5–5)
ALP SERPL-CCNC: 138 U/L (ref 46–116)
ALT SERPL W P-5'-P-CCNC: 45 U/L (ref 12–78)
ANION GAP SERPL CALCULATED.3IONS-SCNC: 1 MMOL/L (ref 4–13)
AST SERPL W P-5'-P-CCNC: 23 U/L (ref 5–45)
BASOPHILS # BLD AUTO: 0.1 THOUSANDS/ÂΜL (ref 0–0.1)
BASOPHILS NFR BLD AUTO: 1 % (ref 0–1)
BILIRUB SERPL-MCNC: 0.4 MG/DL (ref 0.2–1)
BUN SERPL-MCNC: 13 MG/DL (ref 5–25)
CALCIUM SERPL-MCNC: 9.4 MG/DL (ref 8.3–10.1)
CHLORIDE SERPL-SCNC: 109 MMOL/L (ref 96–108)
CHOLEST SERPL-MCNC: 193 MG/DL
CO2 SERPL-SCNC: 27 MMOL/L (ref 21–32)
CREAT SERPL-MCNC: 1.29 MG/DL (ref 0.6–1.3)
EOSINOPHIL # BLD AUTO: 0.37 THOUSAND/ÂΜL (ref 0–0.61)
EOSINOPHIL NFR BLD AUTO: 4 % (ref 0–6)
ERYTHROCYTE [DISTWIDTH] IN BLOOD BY AUTOMATED COUNT: 15.5 % (ref 11.6–15.1)
GFR SERPL CREATININE-BSD FRML MDRD: 68 ML/MIN/1.73SQ M
GLUCOSE P FAST SERPL-MCNC: 84 MG/DL (ref 65–99)
HCT VFR BLD AUTO: 45.2 % (ref 36.5–49.3)
HDLC SERPL-MCNC: 38 MG/DL
HGB BLD-MCNC: 13.8 G/DL (ref 12–17)
IMM GRANULOCYTES # BLD AUTO: 0.04 THOUSAND/UL (ref 0–0.2)
IMM GRANULOCYTES NFR BLD AUTO: 0 % (ref 0–2)
LDLC SERPL CALC-MCNC: 103 MG/DL (ref 0–100)
LYMPHOCYTES # BLD AUTO: 3.37 THOUSANDS/ÂΜL (ref 0.6–4.47)
LYMPHOCYTES NFR BLD AUTO: 36 % (ref 14–44)
MCH RBC QN AUTO: 24 PG (ref 26.8–34.3)
MCHC RBC AUTO-ENTMCNC: 30.5 G/DL (ref 31.4–37.4)
MCV RBC AUTO: 79 FL (ref 82–98)
MONOCYTES # BLD AUTO: 0.67 THOUSAND/ÂΜL (ref 0.17–1.22)
MONOCYTES NFR BLD AUTO: 7 % (ref 4–12)
NEUTROPHILS # BLD AUTO: 4.75 THOUSANDS/ÂΜL (ref 1.85–7.62)
NEUTS SEG NFR BLD AUTO: 52 % (ref 43–75)
NRBC BLD AUTO-RTO: 0 /100 WBCS
PLATELET # BLD AUTO: 320 THOUSANDS/UL (ref 149–390)
PMV BLD AUTO: 10.1 FL (ref 8.9–12.7)
POTASSIUM SERPL-SCNC: 4 MMOL/L (ref 3.5–5.3)
PROT SERPL-MCNC: 8.3 G/DL (ref 6.4–8.4)
RBC # BLD AUTO: 5.74 MILLION/UL (ref 3.88–5.62)
SODIUM SERPL-SCNC: 137 MMOL/L (ref 135–147)
TRIGL SERPL-MCNC: 260 MG/DL
TSH SERPL DL<=0.05 MIU/L-ACNC: 1.8 UIU/ML (ref 0.45–4.5)
URATE SERPL-MCNC: 10 MG/DL (ref 3.5–8.5)
WBC # BLD AUTO: 9.3 THOUSAND/UL (ref 4.31–10.16)

## 2023-06-12 PROCEDURE — 80053 COMPREHEN METABOLIC PANEL: CPT | Performed by: PHYSICIAN ASSISTANT

## 2023-06-12 PROCEDURE — 36415 COLL VENOUS BLD VENIPUNCTURE: CPT

## 2023-06-12 PROCEDURE — 84443 ASSAY THYROID STIM HORMONE: CPT | Performed by: PHYSICIAN ASSISTANT

## 2023-06-12 PROCEDURE — 85025 COMPLETE CBC W/AUTO DIFF WBC: CPT | Performed by: PHYSICIAN ASSISTANT

## 2023-06-12 PROCEDURE — 83036 HEMOGLOBIN GLYCOSYLATED A1C: CPT | Performed by: PHYSICIAN ASSISTANT

## 2023-06-12 PROCEDURE — 84550 ASSAY OF BLOOD/URIC ACID: CPT

## 2023-06-12 PROCEDURE — 80061 LIPID PANEL: CPT | Performed by: PHYSICIAN ASSISTANT

## 2023-06-12 PROCEDURE — 99214 OFFICE O/P EST MOD 30 MIN: CPT | Performed by: PHYSICIAN ASSISTANT

## 2023-06-12 NOTE — PROGRESS NOTES
Name: Cheyenne Lui      : 1985      MRN: [id number]  Encounter Provider: Cuba Lenz PA-C  Encounter Date: 2023   Encounter department: [facility] PRIMARY CARE    Assessment & Plan     Patient Instructions   Assessment/plan:  1  Recurrent gout-patient with frequent episodes  He has been intolerant of allopurinol previously  Would recommend reassessing uric acid level  Continue with use of colchicine when necessary  Would recommend evaluation by rheumatology to consider other preventative treatments  Patient has been doing pretty well with his diet and limiting certain things but sometimes when he eats out at a restaurant he does not always know how something was cooked and this may lead to an episode  He also seems to be getting buildup of tophi in his right olecranon bursa  Recommend evaluation by orthopedic specialist for this  2   Mixed hyperlipidemia-stable, and will reassess labs  3   Hyperglycemia-we will assess hemoglobin A1c with his labs  1  Gout, unspecified cause, unspecified chronicity, unspecified site  -     CBC and differential  -     Comprehensive metabolic panel  -     Uric acid; Future  -     Ambulatory Referral to Rheumatology; Future    2  Mixed hyperlipidemia  -     CBC and differential  -     Comprehensive metabolic panel  -     Lipid Panel with Direct LDL reflex  -     TSH, 3rd generation with Free T4 reflex    3  Hyperglycemia  -     CBC and differential  -     Comprehensive metabolic panel  -     Hemoglobin A1C    4  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  -     Ambulatory Referral to Orthopedic Surgery; Future    5  Elbow swelling, right    6  Acute idiopathic gout of left elbow      BMI Counseling: Body mass index is 38 75 kg/m²  The BMI is above normal  Nutrition recommendations include decreasing portion sizes  Exercise recommendations include exercising 3-5 times per week  Rationale for BMI follow-up plan is due to patient being overweight or obese  Depression Screening and Follow-up Plan: Patient was screened for depression during today's encounter  They screened negative with a PHQ-2 score of 0  Subjective      HPI: This is a 68-year-old gentleman that presents to the office for follow-up of recurrent gout episodes  Episodes typically affect him in the ankle and sometimes in the hand  He also seems to be getting a swelling hardening of the bursa on his right elbow  He has previously tried preventative medication such as allopurinol but had some side effect of myalgia with it and was not able to continue  He has been using colchicine as needed for flareups and it does seem to help significantly  He is continuing to have frequent flareups several times per year however  He does feel that he has been able to isolate certain things in his diet that cause it but sometimes when he is eating out of the restaurant and is not sure what method of cooking was used and ingredients he sometimes has an exacerbation  He is planning to go on vacation to [place] for the next 10 days  He does have enough medication for now  He is questioning if he should possibly see rheumatologist within the office for further evaluation and treatment  It is also been sometime since he has had uric acid levels completed  Review of Systems   Constitutional: Negative for chills, fatigue and fever  HENT: Negative for congestion, ear pain and sinus pressure  Eyes: Negative for visual disturbance  Respiratory: Negative for cough, chest tightness and shortness of breath  Cardiovascular: Negative for chest pain and palpitations  Gastrointestinal: Negative for diarrhea, nausea and vomiting  Endocrine: Negative for polyuria  Genitourinary: Negative for dysuria and frequency  Musculoskeletal: Negative for arthralgias and myalgias  Skin: Negative for pallor and rash  Neurological: Negative for dizziness, weakness, light-headedness, numbness and headaches  "  Psychiatric/Behavioral: Negative for agitation, behavioral problems and sleep disturbance  All other systems reviewed and are negative  Current Outpatient Medications on File Prior to Visit   Medication Sig   • colchicine (COLCRYS) 0 6 mg tablet TWO TABS AT ONSET AND THEN DAILY FOR 4 MORE DAYS AS NEEDED GOUT FLAIR  (NOT COVERED ON INSURANCE)   • diclofenac (VOLTAREN) 75 mg EC tablet Take 1 tablet (75 mg total) by mouth 2 (two) times a day       Objective     BP 98/60 (BP Location: Left arm, Patient Position: Sitting, Cuff Size: Standard)   Pulse 68   Ht 6' 3\" (1 905 m)   Wt (!) 141 kg (310 lb)   SpO2 98%   BMI 38 75 kg/m²     Physical Exam  Vitals and nursing note reviewed  Constitutional:       General: He is not in acute distress  Appearance: He is well-developed  HENT:      Head: Normocephalic and atraumatic  Right Ear: External ear normal       Left Ear: External ear normal       Nose: Nose normal       Mouth/Throat:      Pharynx: No oropharyngeal exudate  Eyes:      Conjunctiva/sclera: Conjunctivae normal       Pupils: Pupils are equal, round, and reactive to light  Neck:      Thyroid: No thyromegaly  Trachea: No tracheal deviation  Cardiovascular:      Rate and Rhythm: Normal rate and regular rhythm  Heart sounds: Normal heart sounds  No murmur heard  No friction rub  Pulmonary:      Effort: Pulmonary effort is normal  No respiratory distress  Breath sounds: Normal breath sounds  No wheezing or rales  Abdominal:      General: Bowel sounds are normal  There is no distension  Palpations: Abdomen is soft  Tenderness: There is no abdominal tenderness  There is no guarding or rebound  Musculoskeletal:         General: No tenderness  Normal range of motion  Cervical back: Normal range of motion and neck supple  Lymphadenopathy:      Cervical: No cervical adenopathy  Skin:     General: Skin is warm and dry        Findings: No erythema or " rash    Neurological:      Mental Status: He is alert and oriented to person, place, and time  Cranial Nerves: No cranial nerve deficit  Coordination: Coordination normal    Psychiatric:         Behavior: Behavior normal          Thought Content:  Thought content normal        Dorian Burns PA-C

## 2023-06-12 NOTE — PATIENT INSTRUCTIONS
Assessment/plan:  1  Recurrent gout-patient with frequent episodes  He has been intolerant of allopurinol previously  Would recommend reassessing uric acid level  Continue with use of colchicine when necessary  Would recommend evaluation by rheumatology to consider other preventative treatments  Patient has been doing pretty well with his diet and limiting certain things but sometimes when he eats out at a restaurant he does not always know how something was cooked and this may lead to an episode  He also seems to be getting buildup of tophi in his right olecranon bursa  Recommend evaluation by orthopedic specialist for this  2   Mixed hyperlipidemia-stable, and will reassess labs  3   Hyperglycemia-we will assess hemoglobin A1c with his labs

## 2023-06-13 LAB
EST. AVERAGE GLUCOSE BLD GHB EST-MCNC: 114 MG/DL
HBA1C MFR BLD: 5.6 %

## 2023-07-03 ENCOUNTER — OFFICE VISIT (OUTPATIENT)
Dept: OBGYN CLINIC | Facility: CLINIC | Age: 38
End: 2023-07-03
Payer: COMMERCIAL

## 2023-07-03 VITALS
SYSTOLIC BLOOD PRESSURE: 100 MMHG | HEART RATE: 80 BPM | DIASTOLIC BLOOD PRESSURE: 62 MMHG | BODY MASS INDEX: 38.54 KG/M2 | HEIGHT: 75 IN | WEIGHT: 310 LBS

## 2023-07-03 DIAGNOSIS — M1A.9XX1: Primary | ICD-10-CM

## 2023-07-03 DIAGNOSIS — M1A.0211: ICD-10-CM

## 2023-07-03 PROCEDURE — 99213 OFFICE O/P EST LOW 20 MIN: CPT | Performed by: PHYSICIAN ASSISTANT

## 2023-07-03 NOTE — PROGRESS NOTES
Patient Name:  Larry Hanson  MRN:  [id number]    Assessment & Plan     Right elbow tophaceous gout. 1. Patient exhibits a large tophaceous deposit in his right elbow that does cause some discomfort with direct pressure. Patient would like to consider surgical excision of the tophaceous test positive. 2. Recommend evaluation by Dr. Erica Rojas to discuss excision. Chief Complaint     Right elbow swelling    History of the Present Illness     Larry Hanson is a 34 y.o. right-hand-dominant male who reports to the office today for evaluation of his right elbow. He notes an onset of swelling approximately 3 years ago. Patient does have a history of gout. He does note gout flares in the elbow in the past.  He notes fluctuations of swelling in the elbow that are associated with gouty attacks. He denies any significant pain. He does note some discomfort in the elbow with leaning on the right elbow. He denies any redness or drainage. No limitations with range of motion. No weakness or instability. No numbness or tingling. No fevers or chills. Physical Exam     /62   Pulse 80   Ht 6' 3" (1.905 m)   Wt (!) 141 kg (310 lb)   BMI 38.75 kg/m²     Right elbow: Skin intact. Large palpable tophaceous deposit appreciated over the olecranon. No erythema ecchymosis or swelling. No drainage. No palpable collection or fluctuance. Tophaceous deposit is minimally tender to palpation. Full elbow range of motion with slight discomfort at terminal flexion. Elbow stable to varus and valgus stress. Sensation intact median ulnar and radial nerves. 2+ radial pulse. Eyes: Anicteric sclerae. ENT: Trachea midline. Lungs: Normal respiratory effort. CV: Capillary refill is less than 2 seconds. Skin: Intact without erythema. Lymph: No palpable lymphadenopathy. Neuro: Sensation is grossly intact to light touch. Psych: Mood and affect are appropriate.     Data Review     I have personally reviewed pertinent films in PACS, and my interpretation follows:    X-rays right elbow 7/3/2023: No acute osseous abnormality. No fracture or dislocation. Swelling appreciated over the olecranon. Past Medical History:   Diagnosis Date   • Arthritis        History reviewed. No pertinent surgical history. Allergies   Allergen Reactions   • Gabapentin    • Zyloprim [Allopurinol] Myalgia       Current Outpatient Medications on File Prior to Visit   Medication Sig Dispense Refill   • colchicine (COLCRYS) 0.6 mg tablet TWO TABS AT ONSET AND THEN DAILY FOR 4 MORE DAYS AS NEEDED GOUT FLAIR. (NOT COVERED ON INSURANCE) 6 tablet 0   • diclofenac (VOLTAREN) 75 mg EC tablet Take 1 tablet (75 mg total) by mouth 2 (two) times a day 60 tablet 0     No current facility-administered medications on file prior to visit. Social History     Tobacco Use   • Smoking status: Never   • Smokeless tobacco: Never   Substance Use Topics   • Alcohol use: No   • Drug use: Never       Family History   Problem Relation Age of Onset   • Hypertension Mother    • No Known Problems Father        Review of Systems     As stated in the HPI. All other systems reviewed and are negative.

## 2023-07-03 NOTE — PATIENT INSTRUCTIONS
1  Chronic recurrent low back pain with radiculopathy - patient did have MRI which shows significant multilevel disc changes  His symptoms seem to be most active at L3 and S1 nerve roots  He has been evaluated by neuro surgery and will exhaust conservative measures 1st   He does have appointment with pain management to consider injection therapies on the 10th of February  In the meantime he is setting up physical therapy evaluation  He will continue diclofenac for inflammation twice daily with food  Does still have muscle relaxer and tramadol which she uses extremely sparingly if necessary for more severe pain  He has been tolerating going to classes and sitting for periods of time  He is interested in returning to work on Monday and he is mostly sedentary and I feel this would be reasonable  I will give him a note to go back on Monday, February 3rd, 2020   2   Elevated blood pressure reading-patient has had a few blood pressure readings which have been borderline to slightly high  Today his diastolic was 98  Some of these readings may be in response to pain  Recommend home monitoring and having closer follow-up after he has had appropriate pain management treatment  3   Obesity -continue with diet and resume exercise once physical therapy shows appropriate exercises given his back pain 
No

## 2023-07-05 DIAGNOSIS — M10.022 ACUTE IDIOPATHIC GOUT OF LEFT ELBOW: ICD-10-CM

## 2023-07-05 DIAGNOSIS — M25.539 CHRONIC WRIST PAIN, UNSPECIFIED LATERALITY: ICD-10-CM

## 2023-07-05 DIAGNOSIS — M25.421 ELBOW SWELLING, RIGHT: ICD-10-CM

## 2023-07-05 DIAGNOSIS — G89.29 CHRONIC WRIST PAIN, UNSPECIFIED LATERALITY: ICD-10-CM

## 2023-07-06 RX ORDER — COLCHICINE 0.6 MG/1
TABLET ORAL
Qty: 6 TABLET | Refills: 0 | Status: SHIPPED | OUTPATIENT
Start: 2023-07-06 | End: 2023-07-10

## 2023-07-06 RX ORDER — DICLOFENAC SODIUM 75 MG/1
75 TABLET, DELAYED RELEASE ORAL 2 TIMES DAILY
Qty: 60 TABLET | Refills: 0 | Status: SHIPPED | OUTPATIENT
Start: 2023-07-06

## 2023-07-09 DIAGNOSIS — M10.022 ACUTE IDIOPATHIC GOUT OF LEFT ELBOW: ICD-10-CM

## 2023-07-09 DIAGNOSIS — M25.421 ELBOW SWELLING, RIGHT: ICD-10-CM

## 2023-07-10 RX ORDER — COLCHICINE 0.6 MG/1
TABLET ORAL
Qty: 6 TABLET | Refills: 0 | Status: SHIPPED | OUTPATIENT
Start: 2023-07-10

## 2023-07-23 DIAGNOSIS — M10.022 ACUTE IDIOPATHIC GOUT OF LEFT ELBOW: ICD-10-CM

## 2023-07-23 DIAGNOSIS — M25.421 ELBOW SWELLING, RIGHT: ICD-10-CM

## 2023-07-24 RX ORDER — COLCHICINE 0.6 MG/1
TABLET ORAL
Qty: 6 TABLET | Refills: 0 | Status: SHIPPED | OUTPATIENT
Start: 2023-07-24

## 2023-08-07 DIAGNOSIS — M10.022 ACUTE IDIOPATHIC GOUT OF LEFT ELBOW: ICD-10-CM

## 2023-08-07 DIAGNOSIS — M25.421 ELBOW SWELLING, RIGHT: ICD-10-CM

## 2023-08-07 RX ORDER — COLCHICINE 0.6 MG/1
TABLET ORAL
Qty: 6 TABLET | Refills: 0 | OUTPATIENT
Start: 2023-08-07

## 2023-08-14 ENCOUNTER — PATIENT MESSAGE (OUTPATIENT)
Dept: FAMILY MEDICINE CLINIC | Facility: CLINIC | Age: 38
End: 2023-08-14

## 2023-08-14 DIAGNOSIS — M25.421 ELBOW SWELLING, RIGHT: ICD-10-CM

## 2023-08-14 DIAGNOSIS — M10.022 ACUTE IDIOPATHIC GOUT OF LEFT ELBOW: ICD-10-CM

## 2023-08-14 RX ORDER — COLCHICINE 0.6 MG/1
TABLET ORAL
Qty: 6 TABLET | Refills: 0 | Status: SHIPPED | OUTPATIENT
Start: 2023-08-14

## 2023-08-16 DIAGNOSIS — M10.022 ACUTE IDIOPATHIC GOUT OF LEFT ELBOW: ICD-10-CM

## 2023-08-16 DIAGNOSIS — M25.421 ELBOW SWELLING, RIGHT: ICD-10-CM

## 2023-08-16 RX ORDER — COLCHICINE 0.6 MG/1
TABLET ORAL
Qty: 6 TABLET | Refills: 0 | OUTPATIENT
Start: 2023-08-16

## 2023-09-01 DIAGNOSIS — M10.022 ACUTE IDIOPATHIC GOUT OF LEFT ELBOW: ICD-10-CM

## 2023-09-01 DIAGNOSIS — M25.421 ELBOW SWELLING, RIGHT: ICD-10-CM

## 2023-09-01 DIAGNOSIS — M25.539 CHRONIC WRIST PAIN, UNSPECIFIED LATERALITY: ICD-10-CM

## 2023-09-01 DIAGNOSIS — G89.29 CHRONIC WRIST PAIN, UNSPECIFIED LATERALITY: ICD-10-CM

## 2023-09-05 RX ORDER — COLCHICINE 0.6 MG/1
TABLET ORAL
Qty: 6 TABLET | Refills: 0 | Status: SHIPPED | OUTPATIENT
Start: 2023-09-05

## 2023-09-05 RX ORDER — DICLOFENAC SODIUM 75 MG/1
75 TABLET, DELAYED RELEASE ORAL 2 TIMES DAILY
Qty: 60 TABLET | Refills: 0 | Status: SHIPPED | OUTPATIENT
Start: 2023-09-05

## 2023-09-08 DIAGNOSIS — M10.022 ACUTE IDIOPATHIC GOUT OF LEFT ELBOW: ICD-10-CM

## 2023-09-08 DIAGNOSIS — M25.421 ELBOW SWELLING, RIGHT: ICD-10-CM

## 2023-09-08 RX ORDER — COLCHICINE 0.6 MG/1
TABLET ORAL
Qty: 6 TABLET | Refills: 0 | OUTPATIENT
Start: 2023-09-08

## 2023-09-18 DIAGNOSIS — M10.022 ACUTE IDIOPATHIC GOUT OF LEFT ELBOW: ICD-10-CM

## 2023-09-18 DIAGNOSIS — M25.421 ELBOW SWELLING, RIGHT: ICD-10-CM

## 2023-09-18 RX ORDER — COLCHICINE 0.6 MG/1
TABLET ORAL
Qty: 6 TABLET | Refills: 0 | Status: SHIPPED | OUTPATIENT
Start: 2023-09-18

## 2023-10-04 DIAGNOSIS — M25.421 ELBOW SWELLING, RIGHT: ICD-10-CM

## 2023-10-04 DIAGNOSIS — M10.022 ACUTE IDIOPATHIC GOUT OF LEFT ELBOW: ICD-10-CM

## 2023-10-04 RX ORDER — COLCHICINE 0.6 MG/1
TABLET ORAL
Qty: 6 TABLET | Refills: 0 | Status: SHIPPED | OUTPATIENT
Start: 2023-10-04 | End: 2023-10-06

## 2023-10-06 DIAGNOSIS — M10.022 ACUTE IDIOPATHIC GOUT OF LEFT ELBOW: ICD-10-CM

## 2023-10-06 DIAGNOSIS — M25.421 ELBOW SWELLING, RIGHT: ICD-10-CM

## 2023-10-06 RX ORDER — COLCHICINE 0.6 MG/1
TABLET ORAL
Qty: 6 TABLET | Refills: 0 | Status: SHIPPED | OUTPATIENT
Start: 2023-10-06

## 2023-10-09 ENCOUNTER — OFFICE VISIT (OUTPATIENT)
Dept: FAMILY MEDICINE CLINIC | Facility: CLINIC | Age: 38
End: 2023-10-09
Payer: COMMERCIAL

## 2023-10-09 VITALS
OXYGEN SATURATION: 96 % | WEIGHT: 309 LBS | SYSTOLIC BLOOD PRESSURE: 100 MMHG | BODY MASS INDEX: 38.42 KG/M2 | HEIGHT: 75 IN | DIASTOLIC BLOOD PRESSURE: 80 MMHG | HEART RATE: 84 BPM

## 2023-10-09 DIAGNOSIS — R73.9 HYPERGLYCEMIA: ICD-10-CM

## 2023-10-09 DIAGNOSIS — M51.36 DDD (DEGENERATIVE DISC DISEASE), LUMBAR: Primary | ICD-10-CM

## 2023-10-09 DIAGNOSIS — M25.421 ELBOW SWELLING, RIGHT: ICD-10-CM

## 2023-10-09 DIAGNOSIS — M70.22 OLECRANON BURSITIS OF LEFT ELBOW: ICD-10-CM

## 2023-10-09 DIAGNOSIS — E78.2 MIXED HYPERLIPIDEMIA: ICD-10-CM

## 2023-10-09 DIAGNOSIS — M10.022 ACUTE IDIOPATHIC GOUT OF LEFT ELBOW: ICD-10-CM

## 2023-10-09 DIAGNOSIS — Z23 NEED FOR INFLUENZA VACCINATION: ICD-10-CM

## 2023-10-09 PROCEDURE — 99214 OFFICE O/P EST MOD 30 MIN: CPT | Performed by: PHYSICIAN ASSISTANT

## 2023-10-09 PROCEDURE — 90471 IMMUNIZATION ADMIN: CPT | Performed by: PHYSICIAN ASSISTANT

## 2023-10-09 PROCEDURE — 90686 IIV4 VACC NO PRSV 0.5 ML IM: CPT | Performed by: PHYSICIAN ASSISTANT

## 2023-10-09 RX ORDER — PREDNISONE 10 MG/1
TABLET ORAL
Qty: 30 TABLET | Refills: 0 | Status: SHIPPED | OUTPATIENT
Start: 2023-10-09 | End: 2023-10-19

## 2023-10-09 RX ORDER — COLCHICINE 0.6 MG/1
TABLET ORAL
Qty: 30 TABLET | Refills: 0 | Status: ON HOLD | OUTPATIENT
Start: 2023-10-09

## 2023-10-09 NOTE — PATIENT INSTRUCTIONS
Assessment/plan:  1. Olecranon bursitis of the left elbow-patient does have quite a bit of edema locally and I believe this is tracking down to his fingers by gravity causing some of the swelling and discomfort. I would recommend starting on prednisone 10 mg, 5 tablets daily for 2 days, then 4 tablets daily for 2 days, then 3 tablets daily for 2 days, then 2 tablets daily for 2 days, then 1 tablet daily for 2 days. Thirty pills with no refills. Follow-up in the next 7 to 10 days if symptoms or not improving. 2.  Degenerative disc disease of the lumbar spine-presently stable. Patient has been on prednisone previously for this. 3.  Recurrent gout-stable with colchicine as necessary  4. Mixed hyperlipidemia-stable with diet control with total cholesterol of 193 and LDL of 103.  5.  Hyperglycemia-stable with hemoglobin A1c of 5.6 over the summer. Continue healthy diet and exercise. Flu vaccine given today.

## 2023-10-09 NOTE — PROGRESS NOTES
Name: Eloy Ortega      : 1985      MRN: [id number]  Encounter Provider: Inna Rodriguez PA-C  Encounter Date: 10/9/2023   Encounter department: [facility] PRIMARY CARE    Assessment & Plan     Patient Instructions   Assessment/plan:  1. Olecranon bursitis of the left elbow-patient does have quite a bit of edema locally and I believe this is tracking down to his fingers by gravity causing some of the swelling and discomfort. I would recommend starting on prednisone 10 mg, 5 tablets daily for 2 days, then 4 tablets daily for 2 days, then 3 tablets daily for 2 days, then 2 tablets daily for 2 days, then 1 tablet daily for 2 days. Thirty pills with no refills. Follow-up in the next 7 to 10 days if symptoms or not improving. 2.  Degenerative disc disease of the lumbar spine-presently stable. Patient has been on prednisone previously for this. 3.  Recurrent gout-stable with colchicine as necessary  4. Mixed hyperlipidemia-stable with diet control with total cholesterol of 193 and LDL of 103.  5.  Hyperglycemia-stable with hemoglobin A1c of 5.6 over the summer. Continue healthy diet and exercise. Flu vaccine given today. 1. DDD (degenerative disc disease), lumbar    2. Mixed hyperlipidemia    3. Hyperglycemia    4. Acute idiopathic gout of left elbow  -     predniSONE 10 mg tablet; 5,5,4,4,3,3,2,2,1,1  -     colchicine (COLCRYS) 0.6 mg tablet; TAKE 2 TABLETS BY MOUTH AT ONSET AND THEN ONCE DAILY FOR 4 MORE DAYS AS NEEDED FOR GOUT FLARES    5. Olecranon bursitis of left elbow  -     predniSONE 10 mg tablet; 5,5,4,4,3,3,2,2,1,1    6. Elbow swelling, right  -     colchicine (COLCRYS) 0.6 mg tablet; TAKE 2 TABLETS BY MOUTH AT ONSET AND THEN ONCE DAILY FOR 4 MORE DAYS AS NEEDED FOR GOUT FLARES    7.  Need for influenza vaccination  -     influenza vaccine, quadrivalent, 0.5 mL, preservative-free, for adult and pediatric patients 6 mos+ (AFLURIA, FLUARIX, FLULAVAL, FLUZONE) Subjective      HPI: This is a 47-year-old gentleman that presents to the office with a history of gout in the elbows and hands but he complains today of symptoms that started about 3 days ago in the left elbow and has not completely resolved with 3 days of colchicine. His symptoms have improved with the colchicine however a bit. Shortly after the elbow swelling he started to get swelling into his index finger on the left hand. He is having some difficulty with bending it although it has gotten better since he has taken the colchicine. He has not had any fevers or chills or other signs of systemic infection. He is interested in getting his flu shot today if available. He has had history of lumbar radiculopathy previously and used prednisone for flareup without side effects. He also has history of hyperlipidemia which has been stable with diet control. Review of Systems   Constitutional: Negative for chills, fatigue and fever. HENT: Negative for congestion, ear pain and sinus pressure. Eyes: Negative for visual disturbance. Respiratory: Negative for cough, chest tightness and shortness of breath. Cardiovascular: Negative for chest pain and palpitations. Gastrointestinal: Negative for diarrhea, nausea and vomiting. Endocrine: Negative for polyuria. Genitourinary: Negative for dysuria and frequency. Musculoskeletal: Positive for arthralgias, joint swelling and myalgias. Skin: Negative for pallor and rash. Neurological: Negative for dizziness, weakness, light-headedness, numbness and headaches. Psychiatric/Behavioral: Negative for agitation, behavioral problems and sleep disturbance. All other systems reviewed and are negative.       Current Outpatient Medications on File Prior to Visit   Medication Sig   • diclofenac (VOLTAREN) 75 mg EC tablet Take 1 tablet (75 mg total) by mouth 2 (two) times a day   • [DISCONTINUED] colchicine (COLCRYS) 0.6 mg tablet TAKE 2 TABLETS BY MOUTH AT ONSET AND THEN ONCE DAILY FOR 4 MORE DAYS AS NEEDED FOR GOUT FLARES       Objective     /80 (BP Location: Left arm, Patient Position: Sitting, Cuff Size: Large)   Pulse 84   Ht 6' 3" (1.905 m)   Wt (!) 140 kg (309 lb)   SpO2 96%   BMI 38.62 kg/m²     Physical Exam  Vitals and nursing note reviewed. Constitutional:       General: He is not in acute distress. Appearance: He is well-developed. HENT:      Head: Normocephalic and atraumatic. Right Ear: External ear normal.      Left Ear: External ear normal.      Nose: Nose normal.      Mouth/Throat:      Pharynx: No oropharyngeal exudate. Eyes:      Conjunctiva/sclera: Conjunctivae normal.      Pupils: Pupils are equal, round, and reactive to light. Neck:      Thyroid: No thyromegaly. Trachea: No tracheal deviation. Cardiovascular:      Rate and Rhythm: Normal rate and regular rhythm. Heart sounds: Normal heart sounds. No murmur heard. No friction rub. Pulmonary:      Effort: Pulmonary effort is normal. No respiratory distress. Breath sounds: Normal breath sounds. No wheezing or rales. Abdominal:      General: Bowel sounds are normal. There is no distension. Palpations: Abdomen is soft. Tenderness: There is no abdominal tenderness. There is no guarding or rebound. Musculoskeletal:         General: No tenderness. Normal range of motion. Cervical back: Normal range of motion and neck supple. Lymphadenopathy:      Cervical: No cervical adenopathy. Skin:     General: Skin is warm and dry. Findings: No erythema or rash. Comments: Left elbow with localized edema and erythema. There is minimal tenderness to palpation. No adenopathy of the antecubital fossa. Patient does have some edema into the index finger of the left hand. Neurological:      Mental Status: He is alert and oriented to person, place, and time. Cranial Nerves: No cranial nerve deficit.       Coordination: Coordination normal.   Psychiatric:         Behavior: Behavior normal.         Thought Content:  Thought content normal.       Norma Tony PA-C

## 2023-10-22 ENCOUNTER — HOSPITAL ENCOUNTER (INPATIENT)
Facility: HOSPITAL | Age: 38
LOS: 2 days | Discharge: HOME | DRG: 316 | End: 2023-10-24
Attending: EMERGENCY MEDICINE | Admitting: INTERNAL MEDICINE
Payer: COMMERCIAL

## 2023-10-22 ENCOUNTER — APPOINTMENT (EMERGENCY)
Dept: RADIOLOGY | Facility: HOSPITAL | Age: 38
DRG: 316 | End: 2023-10-22
Payer: COMMERCIAL

## 2023-10-22 ENCOUNTER — APPOINTMENT (INPATIENT)
Dept: NON INVASIVE DIAGNOSTICS | Facility: HOSPITAL | Age: 38
DRG: 316 | End: 2023-10-22
Payer: COMMERCIAL

## 2023-10-22 DIAGNOSIS — I31.9 PERICARDITIS: Primary | ICD-10-CM

## 2023-10-22 DIAGNOSIS — R79.89 ELEVATED TROPONIN: ICD-10-CM

## 2023-10-22 DIAGNOSIS — R07.9 CHEST PAIN: ICD-10-CM

## 2023-10-22 LAB
2HR DELTA HS TROPONIN: 4963 NG/L
ALBUMIN SERPL BCP-MCNC: 3.9 G/DL (ref 3.5–5)
ALP SERPL-CCNC: 111 U/L (ref 34–104)
ALT SERPL W P-5'-P-CCNC: 39 U/L (ref 7–52)
ANION GAP SERPL CALCULATED.3IONS-SCNC: 8 MMOL/L
AST SERPL W P-5'-P-CCNC: 70 U/L (ref 13–39)
BASOPHILS # BLD AUTO: 0.08 THOUSANDS/ÂΜL (ref 0–0.1)
BASOPHILS NFR BLD AUTO: 1 % (ref 0–1)
BILIRUB SERPL-MCNC: 0.43 MG/DL (ref 0.2–1)
BUN SERPL-MCNC: 17 MG/DL (ref 5–25)
C3 SERPL-MCNC: 189 MG/DL (ref 87–200)
C4 SERPL-MCNC: 69 MG/DL (ref 19–52)
CALCIUM SERPL-MCNC: 8.9 MG/DL (ref 8.4–10.2)
CARDIAC TROPONIN I PNL SERPL HS: ABNORMAL NG/L
CARDIAC TROPONIN I PNL SERPL HS: ABNORMAL NG/L
CHLORIDE SERPL-SCNC: 103 MMOL/L (ref 96–108)
CO2 SERPL-SCNC: 26 MMOL/L (ref 21–32)
CREAT SERPL-MCNC: 1.39 MG/DL (ref 0.6–1.3)
CRP SERPL QL: 172.6 MG/L
EOSINOPHIL # BLD AUTO: 0.29 THOUSAND/ÂΜL (ref 0–0.61)
EOSINOPHIL NFR BLD AUTO: 2 % (ref 0–6)
ERYTHROCYTE [DISTWIDTH] IN BLOOD BY AUTOMATED COUNT: 15.8 % (ref 11.6–15.1)
ERYTHROCYTE [SEDIMENTATION RATE] IN BLOOD: 119 MM/HOUR (ref 0–14)
FLUAV RNA RESP QL NAA+PROBE: NEGATIVE
FLUBV RNA RESP QL NAA+PROBE: NEGATIVE
GFR SERPL CREATININE-BSD FRML MDRD: 62 ML/MIN/1.73SQ M
GLUCOSE SERPL-MCNC: 115 MG/DL (ref 65–140)
HCT VFR BLD AUTO: 41.3 % (ref 36.5–49.3)
HGB BLD-MCNC: 13 G/DL (ref 12–17)
IMM GRANULOCYTES # BLD AUTO: 0.1 THOUSAND/UL (ref 0–0.2)
IMM GRANULOCYTES NFR BLD AUTO: 1 % (ref 0–2)
LYMPHOCYTES # BLD AUTO: 2.72 THOUSANDS/ÂΜL (ref 0.6–4.47)
LYMPHOCYTES NFR BLD AUTO: 21 % (ref 14–44)
MCH RBC QN AUTO: 24.6 PG (ref 26.8–34.3)
MCHC RBC AUTO-ENTMCNC: 31.5 G/DL (ref 31.4–37.4)
MCV RBC AUTO: 78 FL (ref 82–98)
MONOCYTES # BLD AUTO: 1.26 THOUSAND/ÂΜL (ref 0.17–1.22)
MONOCYTES NFR BLD AUTO: 10 % (ref 4–12)
NEUTROPHILS # BLD AUTO: 8.3 THOUSANDS/ÂΜL (ref 1.85–7.62)
NEUTS SEG NFR BLD AUTO: 65 % (ref 43–75)
NRBC BLD AUTO-RTO: 0 /100 WBCS
PLATELET # BLD AUTO: 423 THOUSANDS/UL (ref 149–390)
PMV BLD AUTO: 9.3 FL (ref 8.9–12.7)
POTASSIUM SERPL-SCNC: 4.2 MMOL/L (ref 3.5–5.3)
PROT SERPL-MCNC: 7.7 G/DL (ref 6.4–8.4)
RBC # BLD AUTO: 5.29 MILLION/UL (ref 3.88–5.62)
RSV RNA RESP QL NAA+PROBE: NEGATIVE
SARS-COV-2 RNA RESP QL NAA+PROBE: NEGATIVE
SODIUM SERPL-SCNC: 137 MMOL/L (ref 135–147)
WBC # BLD AUTO: 12.75 THOUSAND/UL (ref 4.31–10.16)

## 2023-10-22 PROCEDURE — 86160 COMPLEMENT ANTIGEN: CPT | Performed by: INTERNAL MEDICINE

## 2023-10-22 PROCEDURE — 93308 TTE F-UP OR LMTD: CPT

## 2023-10-22 PROCEDURE — 71046 X-RAY EXAM CHEST 2 VIEWS: CPT

## 2023-10-22 PROCEDURE — 93321 DOPPLER ECHO F-UP/LMTD STD: CPT

## 2023-10-22 PROCEDURE — 86140 C-REACTIVE PROTEIN: CPT | Performed by: INTERNAL MEDICINE

## 2023-10-22 PROCEDURE — 36415 COLL VENOUS BLD VENIPUNCTURE: CPT

## 2023-10-22 PROCEDURE — 85025 COMPLETE CBC W/AUTO DIFF WBC: CPT | Performed by: EMERGENCY MEDICINE

## 2023-10-22 PROCEDURE — 99285 EMERGENCY DEPT VISIT HI MDM: CPT

## 2023-10-22 PROCEDURE — 96374 THER/PROPH/DIAG INJ IV PUSH: CPT

## 2023-10-22 PROCEDURE — 86200 CCP ANTIBODY: CPT | Performed by: INTERNAL MEDICINE

## 2023-10-22 PROCEDURE — 80053 COMPREHEN METABOLIC PANEL: CPT | Performed by: EMERGENCY MEDICINE

## 2023-10-22 PROCEDURE — 84484 ASSAY OF TROPONIN QUANT: CPT | Performed by: EMERGENCY MEDICINE

## 2023-10-22 PROCEDURE — 86430 RHEUMATOID FACTOR TEST QUAL: CPT | Performed by: INTERNAL MEDICINE

## 2023-10-22 PROCEDURE — 93325 DOPPLER ECHO COLOR FLOW MAPG: CPT | Performed by: INTERNAL MEDICINE

## 2023-10-22 PROCEDURE — 99223 1ST HOSP IP/OBS HIGH 75: CPT | Performed by: INTERNAL MEDICINE

## 2023-10-22 PROCEDURE — 93005 ELECTROCARDIOGRAM TRACING: CPT

## 2023-10-22 PROCEDURE — 86038 ANTINUCLEAR ANTIBODIES: CPT | Performed by: INTERNAL MEDICINE

## 2023-10-22 PROCEDURE — 85652 RBC SED RATE AUTOMATED: CPT | Performed by: INTERNAL MEDICINE

## 2023-10-22 PROCEDURE — 93308 TTE F-UP OR LMTD: CPT | Performed by: INTERNAL MEDICINE

## 2023-10-22 PROCEDURE — 93321 DOPPLER ECHO F-UP/LMTD STD: CPT | Performed by: INTERNAL MEDICINE

## 2023-10-22 PROCEDURE — NC001 PR NO CHARGE: Performed by: EMERGENCY MEDICINE

## 2023-10-22 PROCEDURE — 99285 EMERGENCY DEPT VISIT HI MDM: CPT | Performed by: EMERGENCY MEDICINE

## 2023-10-22 PROCEDURE — 76604 US EXAM CHEST: CPT | Performed by: EMERGENCY MEDICINE

## 2023-10-22 PROCEDURE — 0241U HB NFCT DS VIR RESP RNA 4 TRGT: CPT | Performed by: INTERNAL MEDICINE

## 2023-10-22 PROCEDURE — 93325 DOPPLER ECHO COLOR FLOW MAPG: CPT

## 2023-10-22 RX ORDER — ENOXAPARIN SODIUM 100 MG/ML
40 INJECTION SUBCUTANEOUS DAILY
Status: DISCONTINUED | OUTPATIENT
Start: 2023-10-23 | End: 2023-10-24 | Stop reason: HOSPADM

## 2023-10-22 RX ORDER — ASPIRIN 325 MG
975 TABLET ORAL 3 TIMES DAILY
Status: DISCONTINUED | OUTPATIENT
Start: 2023-10-22 | End: 2023-10-24 | Stop reason: HOSPADM

## 2023-10-22 RX ORDER — ONDANSETRON 2 MG/ML
4 INJECTION INTRAMUSCULAR; INTRAVENOUS EVERY 6 HOURS PRN
Status: DISCONTINUED | OUTPATIENT
Start: 2023-10-22 | End: 2023-10-24 | Stop reason: HOSPADM

## 2023-10-22 RX ORDER — KETOROLAC TROMETHAMINE 30 MG/ML
15 INJECTION, SOLUTION INTRAMUSCULAR; INTRAVENOUS ONCE
Status: COMPLETED | OUTPATIENT
Start: 2023-10-22 | End: 2023-10-22

## 2023-10-22 RX ORDER — ACETAMINOPHEN 325 MG/1
650 TABLET ORAL EVERY 6 HOURS PRN
Status: DISCONTINUED | OUTPATIENT
Start: 2023-10-22 | End: 2023-10-24 | Stop reason: HOSPADM

## 2023-10-22 RX ORDER — COLCHICINE 0.6 MG/1
0.6 TABLET ORAL 2 TIMES DAILY
Status: DISCONTINUED | OUTPATIENT
Start: 2023-10-22 | End: 2023-10-24 | Stop reason: HOSPADM

## 2023-10-22 RX ADMIN — KETOROLAC TROMETHAMINE 15 MG: 30 INJECTION, SOLUTION INTRAMUSCULAR; INTRAVENOUS at 19:22

## 2023-10-22 NOTE — ED PROVIDER NOTES
History  Chief Complaint   Patient presents with    Chest Pain     Intermittent since last night around 8pm, throughout night and today. Feels it most when laying down and jaw pain at times. 40-year-old male patient with hx of gout  presenting with chest pain onset yesterday. Patient states pain is intermittent onset 8 PM yesterday. Patient states pain comes on when he is laying down and improves when he sitting up. Patient states that he took Tylenol prior to arrival.  Patient states that he has some mild shortness of breath when the chest pain comes on. Patient states that he had flu shot 2 weeks ago. Denies fevers, nausea, vomiting, diarrhea, urinary symptoms. Denies previous history of this. Prior to Admission Medications   Prescriptions Last Dose Informant Patient Reported? Taking?   colchicine (COLCRYS) 0.6 mg tablet   No No   Sig: TAKE 2 TABLETS BY MOUTH AT ONSET AND THEN ONCE DAILY FOR 4 MORE DAYS AS NEEDED FOR GOUT FLARES   diclofenac (VOLTAREN) 75 mg EC tablet  Self No No   Sig: Take 1 tablet (75 mg total) by mouth 2 (two) times a day      Facility-Administered Medications: None       Past Medical History:   Diagnosis Date    Arthritis        History reviewed. No pertinent surgical history. Family History   Problem Relation Age of Onset    Hypertension Mother     No Known Problems Father      I have reviewed and agree with the history as documented. E-Cigarette/Vaping     E-Cigarette/Vaping Substances     Social History     Tobacco Use    Smoking status: Never    Smokeless tobacco: Never   Substance Use Topics    Alcohol use: Never    Drug use: Never        Review of Systems   Respiratory:  Positive for shortness of breath. Cardiovascular:  Positive for chest pain. All other systems reviewed and are negative.       Physical Exam  ED Triage Vitals [10/22/23 1851]   Temperature Pulse Respirations Blood Pressure SpO2   98.4 °F (36.9 °C) 76 16 (!) 153/102 100 %      Temp Source Heart Rate Source Patient Position - Orthostatic VS BP Location FiO2 (%)   Oral Monitor Lying Left arm --      Pain Score       6             Orthostatic Vital Signs  Vitals:    10/23/23 1731 10/23/23 1942 10/23/23 2218 10/24/23 0153   BP: 107/71 127/86 119/76 119/77   Pulse: 86 92 83 81   Patient Position - Orthostatic VS:  Lying Lying        Physical Exam  Vitals reviewed. Constitutional:       Appearance: Normal appearance. HENT:      Head: Normocephalic and atraumatic. Nose: Nose normal.      Mouth/Throat:      Mouth: Mucous membranes are moist.      Pharynx: Oropharynx is clear. Eyes:      Extraocular Movements: Extraocular movements intact. Conjunctiva/sclera: Conjunctivae normal.   Cardiovascular:      Rate and Rhythm: Normal rate and regular rhythm. Pulses: Normal pulses. Heart sounds: Normal heart sounds. Pulmonary:      Effort: Pulmonary effort is normal.      Breath sounds: Normal breath sounds. Abdominal:      General: Bowel sounds are normal.      Palpations: Abdomen is soft. Tenderness: There is no abdominal tenderness. Musculoskeletal:         General: Normal range of motion. Cervical back: Normal range of motion. Right lower leg: No tenderness. No edema. Left lower leg: No tenderness. No edema. Skin:     General: Skin is warm and dry. Neurological:      General: No focal deficit present. Mental Status: He is alert and oriented to person, place, and time. Mental status is at baseline.          ED Medications  Medications   enoxaparin (LOVENOX) subcutaneous injection 40 mg (0 mg Subcutaneous Hold 10/23/23 0809)   ondansetron (ZOFRAN) injection 4 mg (has no administration in time range)   acetaminophen (TYLENOL) tablet 650 mg (650 mg Oral Given 10/23/23 0608)   colchicine (COLCRYS) tablet 0.6 mg (0.6 mg Oral Given 10/23/23 1757)   aspirin tablet 975 mg (975 mg Oral Given 10/23/23 2046)   pantoprazole (PROTONIX) EC tablet 40 mg (40 mg Oral Given 10/24/23 0526)   ketorolac (TORADOL) injection 15 mg (15 mg Intravenous Given 10/22/23 1922)   potassium chloride (K-DUR,KLOR-CON) CR tablet 40 mEq (40 mEq Oral Given 10/23/23 0933)   perflutren lipid microsphere (DEFINITY) injection (0.8 mL/min Intravenous Given 10/23/23 1200)   Gadobutrol injection (SINGLE-DOSE) SOLN 26 mL (26 mL Intravenous Given 10/24/23 0446)       Diagnostic Studies  Results Reviewed       Procedure Component Value Units Date/Time    VIRIDIANA Screen w/ Reflex to Titer/Pattern [[id number]]  (Normal) Collected: 10/22/23 2128    Lab Status: Final result Specimen: Blood from Arm, Right Updated: 10/23/23 1111     VIRIDIANA Negative    Narrative:      Test performed on the [employer] system using multiplex flow immunoassay methodology. A negative VIRIDIANA Screen (or indeterminate for dsDNA) reflects antibodies absent or below the cut-off values for all the following analytes: dsDNA, Chromatin, Ribosomal P, SS-A, SS-B, Sm, SmRNP, RNP,  Scl-70, Martina-1 and Centromere B. The VIRIDIANA Screen result should be interpreted in conjunction with other laboratory test results and clinical presentation of the patient in the diagnosis for autoimmune disease. SLE patients undergoing steroid or immunosuppressant therapy may have negative test results.         RF Screen w/ Reflex to Titer [[id number]]  (Normal) Collected: 10/22/23 2128    Lab Status: Final result Specimen: Blood from Arm, Right Updated: 10/23/23 1012     Rheumatoid Factor Negative    HS Troponin I 4hr [[id number]]  (Abnormal) Collected: 10/23/23 0018    Lab Status: Final result Specimen: Blood from Arm, Left Updated: 10/23/23 0102     hs TnI 4hr 15,892 ng/L      Delta 4hr hsTnI 2,370 ng/L     FLU/RSV/COVID - if FLU/RSV clinically relevant [[id number]]  (Normal) Collected: 10/22/23 2128    Lab Status: Final result Specimen: Nares from Nose Updated: 10/22/23 2220     SARS-CoV-2 Negative     INFLUENZA A PCR Negative     INFLUENZA B PCR Negative     RSV PCR Negative Narrative:      FOR PEDIATRIC PATIENTS - copy/paste COVID Guidelines URL to browser: [email or web address]    SARS-CoV-2 assay is a Nucleic Acid Amplification assay intended for the  qualitative detection of nucleic acid from SARS-CoV-2 in nasopharyngeal  swabs. Results are for the presumptive identification of SARS-CoV-2 RNA. Positive results are indicative of infection with SARS-CoV-2, the virus  causing COVID-19, but do not rule out bacterial infection or co-infection  with other viruses. Laboratories within the [facility] and its  territories are required to report all positive results to the appropriate  public health authorities. Negative results do not preclude SARS-CoV-2  infection and should not be used as the sole basis for treatment or other  patient management decisions. Negative results must be combined with  clinical observations, patient history, and epidemiological information. This test has not been FDA cleared or approved. This test has been authorized by FDA under an Emergency Use Authorization  (EUA). This test is only authorized for the duration of time the  declaration that circumstances exist justifying the authorization of the  emergency use of an in vitro diagnostic tests for detection of SARS-CoV-2  virus and/or diagnosis of COVID-19 infection under section 564(b)(1) of  the Act, 21 U. S.C. 523IYG-1(F)(0), unless the authorization is terminated  or revoked sooner. The test has been validated but independent review by FDA  and CLIA is pending. Test performed using [employer] GeneXpert: This RT-PCR assay targets N2,  a region unique to SARS-CoV-2. A conserved region in the E-gene was chosen  for pan-Sarbecovirus detection which includes SARS-CoV-2. According to CMS-2020-01-R, this platform meets the definition of high-throughput technology.     Sedimentation rate, automated [[id number]]  (Abnormal) Collected: 10/22/23 1903    Lab Status: Final result Specimen: Blood from Arm, Right Updated: 10/22/23 2158     Sed Rate 119 mm/hour     C3 complement [[id number]]  (Normal) Collected: 10/22/23 2128    Lab Status: Final result Specimen: Blood from Arm, Right Updated: 10/22/23 2157     C3 Complement 189 mg/dL     C4 complement [[id number]]  (Abnormal) Collected: 10/22/23 2128    Lab Status: Final result Specimen: Blood from Arm, Right Updated: 10/22/23 2157     C4, COMPLEMENT 69 mg/dL     HS Troponin I 2hr [[id number]]  (Abnormal) Collected: 10/22/23 2101    Lab Status: Final result Specimen: Blood from Arm, Right Updated: 10/22/23 2132     hs TnI 2hr 18,485 ng/L      Delta 2hr hsTnI 5,091 ng/L     Cyclic citrul peptide antibody, IgG [[id number]] Collected: 10/22/23 2128    Lab Status:  In process Specimen: Blood from Arm, Right Updated: 10/22/23 2132    C-reactive protein [[id number]]  (Abnormal) Collected: 10/22/23 1903    Lab Status: Final result Specimen: Blood from Arm, Right Updated: 10/22/23 2126     .6 mg/L     HS Troponin 0hr (reflex protocol) [[id number]]  (Abnormal) Collected: 10/22/23 1903    Lab Status: Final result Specimen: Blood from Arm, Right Updated: 10/22/23 1934     hs TnI 0hr 13,522 ng/L     Comprehensive metabolic panel [[id number]]  (Abnormal) Collected: 10/22/23 1903    Lab Status: Final result Specimen: Blood from Arm, Right Updated: 10/22/23 1927     Sodium 137 mmol/L      Potassium 4.2 mmol/L      Chloride 103 mmol/L      CO2 26 mmol/L      ANION GAP 8 mmol/L      BUN 17 mg/dL      Creatinine 1.39 mg/dL      Glucose 115 mg/dL      Calcium 8.9 mg/dL      AST 70 U/L      ALT 39 U/L      Alkaline Phosphatase 111 U/L      Total Protein 7.7 g/dL      Albumin 3.9 g/dL      Total Bilirubin 0.43 mg/dL      eGFR 62 ml/min/1.73sq m     Narrative:      [place] guidelines for Chronic Kidney Disease (CKD):     Stage 1 with normal or high GFR (GFR > 90 mL/min/1.73 square meters)    Stage 2 Mild CKD (GFR = 60-89 mL/min/1.73 square meters)    Stage 3A Moderate CKD (GFR = 45-59 mL/min/1.73 square meters)    Stage 3B Moderate CKD (GFR = 30-44 mL/min/1.73 square meters)    Stage 4 Severe CKD (GFR = 15-29 mL/min/1.73 square meters)    Stage 5 End Stage CKD (GFR <15 mL/min/1.73 square meters)  Note: GFR calculation is accurate only with a steady state creatinine    CBC and differential [[id number]]  (Abnormal) Collected: 10/22/23 1903    Lab Status: Final result Specimen: Blood from Arm, Right Updated: 10/22/23 1909     WBC 12.75 Thousand/uL      RBC 5.29 Million/uL      Hemoglobin 13.0 g/dL      Hematocrit 41.3 %      MCV 78 fL      MCH 24.6 pg      MCHC 31.5 g/dL      RDW 15.8 %      MPV 9.3 fL      Platelets 781 Thousands/uL      nRBC 0 /100 WBCs      Neutrophils Relative 65 %      Immat GRANS % 1 %      Lymphocytes Relative 21 %      Monocytes Relative 10 %      Eosinophils Relative 2 %      Basophils Relative 1 %      Neutrophils Absolute 8.30 Thousands/µL      Immature Grans Absolute 0.10 Thousand/uL      Lymphocytes Absolute 2.72 Thousands/µL      Monocytes Absolute 1.26 Thousand/µL      Eosinophils Absolute 0.29 Thousand/µL      Basophils Absolute 0.08 Thousands/µL                    XR chest 2 views   Final Result by Shahid Post MD (10/23 1123)      No acute cardiopulmonary disease.                   Workstation performed: [id number]         MRI cardiac  w wo contrast    (Results Pending)         Procedures  POC Cardiac US    Date/Time: 10/22/2023 7:20 PM    Performed by: Deb Clay MD  Authorized by: Deb lCay MD    Patient location:  ED  Other Assisting Provider: No    Procedure details:     Exam Type:  Diagnostic    Indications: chest pain      Assessment / Evaluation for: cardiac function and pericardial effusion      Exam Type: initial exam      Image quality: limited diagnostic      Image availability:  Images available in PACS  Patient Details:     Cardiac Rhythm:  Regular  Cardiac findings: Echo technique: limited 2D      Views obtained: parasternal long axis, parasternal short axis and subcostal      Pericardial effusion: absent      Tamponade physiology: absent      Wall motion: normal      LV systolic function: normal    Pulmonary findings:     B-lines: no B-lines present    Interpretation:     Fluid Status:  Euvolemic        ED Course                             SBIRT 22yo+      Flowsheet Row Most Recent Value   Initial Alcohol Screen: US AUDIT-C     1. How often do you have a drink containing alcohol? 0 Filed at: 10/22/2023 1909   2. How many drinks containing alcohol do you have on a typical day you are drinking? 0 Filed at: 10/22/2023 1909   3a. Male UNDER 65: How often do you have five or more drinks on one occasion? 0 Filed at: 10/22/2023 1909   3b. FEMALE Any Age, or MALE 65+: How often do you have 4 or more drinks on one occassion? 0 Filed at: 10/22/2023 1909   Audit-C Score 0 Filed at: 10/22/2023 1909   LISA: How many times in the past year have you. .. Used an illegal drug or used a prescription medication for non-medical reasons? Never Filed at: 10/22/2023 1909          CK Risk Score      Flowsheet Row Most Recent Value   Age >= 72 0 Filed at: 10/22/2023 2134   Known CAD (stenosis >= 50%) 0 Filed at: 10/22/2023 2134   Recent (<=24 hrs) Service Angina 0 Filed at: 10/22/2023 2134   ST Deviation >= 0.5 mm 1 Filed at: 10/22/2023 2134   3+ CAD Risk Factors (FHx, HTN, HLP, DM, Smoker) 0 Filed at: 10/22/2023 2134   Aspirin Use Past 7 Days 0 Filed at: 10/22/2023 2134   Elevated Cardiac Markers 1 Filed at: 10/22/2023 2134   CK Risk Score (Calculated) 2 Filed at: 10/22/2023 2134                Medical Decision Making  40-year-old male patient presenting with chest pain onset yesterday. Patient states intermittent and worsens when he sits up. DDx includes pericarditis, myocarditis, ACS, pneumonia. Exam within normal limits. Bedside ultrasound within normal limits.   EKG shows elevated ST with ME depressions diffusely concerning for pericarditis. Troponin initially 13,522. Mild elevated leukocytosis. Chest x-ray negative. Patient treated with Toradol with improvement of symptoms. This is likely myopericarditis. Spoke with cardiology, admitted to medicine. Discussed results with patient. Amount and/or Complexity of Data Reviewed  Labs: ordered. Radiology: ordered. Discussion of management or test interpretation with external provider(s): Admit to medicine. Consulted cardiology. Risk  Prescription drug management. Decision regarding hospitalization. Disposition  Final diagnoses:   Pericarditis   Chest pain   Elevated troponin     Time reflects when diagnosis was documented in both MDM as applicable and the Disposition within this note       Time User Action Codes Description Comment    10/22/2023  8:31 PM Fernando ARCOS [facility] Add [I31.9] Pericarditis     10/22/2023  9:25 PM Keerthi Shoulders Add [R07.9] Chest pain     10/22/2023  9:26 PM Keerthi Shoulders Add [R79.89] Elevated troponin           ED Disposition       ED Disposition   Admit    Condition   Stable    Date/Time   Sun Oct 22, 2023 2125    Comment   Case was discussed with Dr. Maximino Chase and the patient's admission status was agreed to be Admission Status: inpatient status to the service of Dr. Maximino Chase .                Follow-up Information    None         Current Discharge Medication List        CONTINUE these medications which have NOT CHANGED    Details   colchicine (COLCRYS) 0.6 mg tablet TAKE 2 TABLETS BY MOUTH AT ONSET AND THEN ONCE DAILY FOR 4 MORE DAYS AS NEEDED FOR GOUT FLARES  Qty: 30 tablet, Refills: 0    Comments: Patient needs office visit  Associated Diagnoses: Acute idiopathic gout of left elbow; Elbow swelling, right      diclofenac (VOLTAREN) 75 mg EC tablet Take 1 tablet (75 mg total) by mouth 2 (two) times a day  Qty: 60 tablet, Refills: 0    Associated Diagnoses: Chronic wrist pain, unspecified laterality No discharge procedures on file. PDMP Review         Value Time User    PDMP Reviewed  Yes 10/22/2023  9:35 PM Aaliyah Smith DO             ED Provider  Attending physically available and evaluated Anette Merino. I managed the patient along with the ED Attending.     Electronically Signed by           Jase Bagley MD  10/24/23 0526

## 2023-10-23 ENCOUNTER — APPOINTMENT (INPATIENT)
Dept: NON INVASIVE DIAGNOSTICS | Facility: HOSPITAL | Age: 38
DRG: 316 | End: 2023-10-23
Attending: INTERNAL MEDICINE
Payer: COMMERCIAL

## 2023-10-23 LAB
4HR DELTA HS TROPONIN: 2370 NG/L
ANA SER QL IA: NEGATIVE
ANION GAP SERPL CALCULATED.3IONS-SCNC: 12 MMOL/L
AORTIC ROOT: 3.3 CM
APICAL FOUR CHAMBER EJECTION FRACTION: 47 %
ASCENDING AORTA: 3.6 CM
ATRIAL RATE: 64 BPM
ATRIAL RATE: 75 BPM
ATRIAL RATE: 76 BPM
ATRIAL RATE: 77 BPM
BUN SERPL-MCNC: 16 MG/DL (ref 5–25)
CALCIUM SERPL-MCNC: 9.2 MG/DL (ref 8.4–10.2)
CARDIAC TROPONIN I PNL SERPL HS: ABNORMAL NG/L
CHLORIDE SERPL-SCNC: 102 MMOL/L (ref 96–108)
CO2 SERPL-SCNC: 24 MMOL/L (ref 21–32)
CREAT SERPL-MCNC: 1.24 MG/DL (ref 0.6–1.3)
E WAVE DECELERATION TIME: 230 MS
E/A RATIO: 1.1
ERYTHROCYTE [DISTWIDTH] IN BLOOD BY AUTOMATED COUNT: 15.9 % (ref 11.6–15.1)
FRACTIONAL SHORTENING: 24 (ref 28–44)
GFR SERPL CREATININE-BSD FRML MDRD: 72 ML/MIN/1.73SQ M
GLUCOSE SERPL-MCNC: 87 MG/DL (ref 65–140)
HCT VFR BLD AUTO: 44.5 % (ref 36.5–49.3)
HGB BLD-MCNC: 13.3 G/DL (ref 12–17)
INTERVENTRICULAR SEPTUM IN DIASTOLE (PARASTERNAL SHORT AXIS VIEW): 1.2 CM
INTERVENTRICULAR SEPTUM: 1.2 CM (ref 0.6–1.1)
LAAS-AP2: 22.5 CM2
LAAS-AP4: 15.6 CM2
LEFT ATRIUM SIZE: 4.1 CM
LEFT ATRIUM VOLUME (MOD BIPLANE): 56 ML
LEFT ATRIUM VOLUME INDEX (MOD BIPLANE): 21.5 ML/M2
LEFT INTERNAL DIMENSION IN SYSTOLE: 3.8 CM (ref 2.1–4)
LEFT VENTRICULAR INTERNAL DIMENSION IN DIASTOLE: 5 CM (ref 3.5–6)
LEFT VENTRICULAR POSTERIOR WALL IN END DIASTOLE: 1 CM
LEFT VENTRICULAR STROKE VOLUME: 58 ML
LVSV (TEICH): 58 ML
MCH RBC QN AUTO: 24.6 PG (ref 26.8–34.3)
MCHC RBC AUTO-ENTMCNC: 29.9 G/DL (ref 31.4–37.4)
MCV RBC AUTO: 82 FL (ref 82–98)
MV E'TISSUE VEL-LAT: 14 CM/S
MV E'TISSUE VEL-SEP: 15 CM/S
MV PEAK A VEL: 0.63 M/S
MV PEAK E VEL: 69 CM/S
MV STENOSIS PRESSURE HALF TIME: 67 MS
MV VALVE AREA P 1/2 METHOD: 3.28
P AXIS: 13 DEGREES
P AXIS: 28 DEGREES
P AXIS: 35 DEGREES
P AXIS: 73 DEGREES
PLATELET # BLD AUTO: 446 THOUSANDS/UL (ref 149–390)
PMV BLD AUTO: 9.2 FL (ref 8.9–12.7)
POTASSIUM SERPL-SCNC: 3.5 MMOL/L (ref 3.5–5.3)
PR INTERVAL: 158 MS
PR INTERVAL: 164 MS
PR INTERVAL: 170 MS
PR INTERVAL: 170 MS
QRS AXIS: -1 DEGREES
QRS AXIS: -2 DEGREES
QRS AXIS: 33 DEGREES
QRS AXIS: 68 DEGREES
QRSD INTERVAL: 112 MS
QT INTERVAL: 360 MS
QT INTERVAL: 370 MS
QT INTERVAL: 378 MS
QT INTERVAL: 404 MS
QTC INTERVAL: 407 MS
QTC INTERVAL: 413 MS
QTC INTERVAL: 416 MS
QTC INTERVAL: 425 MS
RBC # BLD AUTO: 5.4 MILLION/UL (ref 3.88–5.62)
RHEUMATOID FACT SER QL LA: NEGATIVE
RIGHT ATRIUM AREA SYSTOLE A4C: 14.5 CM2
RIGHT VENTRICLE ID DIMENSION: 3.5 CM
SL CV LEFT ATRIUM LENGTH A2C: 6.2 CM
SL CV LV EF: 55
SL CV PED ECHO LEFT VENTRICLE DIASTOLIC VOLUME (MOD BIPLANE) 2D: 119 ML
SL CV PED ECHO LEFT VENTRICLE SYSTOLIC VOLUME (MOD BIPLANE) 2D: 61 ML
SODIUM SERPL-SCNC: 138 MMOL/L (ref 135–147)
T WAVE AXIS: 10 DEGREES
T WAVE AXIS: 23 DEGREES
T WAVE AXIS: 54 DEGREES
T WAVE AXIS: 59 DEGREES
TRICUSPID ANNULAR PLANE SYSTOLIC EXCURSION: 2.1 CM
VENTRICULAR RATE: 64 BPM
VENTRICULAR RATE: 75 BPM
VENTRICULAR RATE: 76 BPM
VENTRICULAR RATE: 77 BPM
WBC # BLD AUTO: 10.82 THOUSAND/UL (ref 4.31–10.16)

## 2023-10-23 PROCEDURE — 93010 ELECTROCARDIOGRAM REPORT: CPT | Performed by: INTERNAL MEDICINE

## 2023-10-23 PROCEDURE — 85027 COMPLETE CBC AUTOMATED: CPT | Performed by: INTERNAL MEDICINE

## 2023-10-23 PROCEDURE — 93306 TTE W/DOPPLER COMPLETE: CPT

## 2023-10-23 PROCEDURE — 93306 TTE W/DOPPLER COMPLETE: CPT | Performed by: INTERNAL MEDICINE

## 2023-10-23 PROCEDURE — 93005 ELECTROCARDIOGRAM TRACING: CPT

## 2023-10-23 PROCEDURE — 84484 ASSAY OF TROPONIN QUANT: CPT | Performed by: INTERNAL MEDICINE

## 2023-10-23 PROCEDURE — 80048 BASIC METABOLIC PNL TOTAL CA: CPT | Performed by: INTERNAL MEDICINE

## 2023-10-23 PROCEDURE — 99232 SBSQ HOSP IP/OBS MODERATE 35: CPT | Performed by: PHYSICIAN ASSISTANT

## 2023-10-23 RX ORDER — PANTOPRAZOLE SODIUM 40 MG/1
40 TABLET, DELAYED RELEASE ORAL
Status: DISCONTINUED | OUTPATIENT
Start: 2023-10-23 | End: 2023-10-24 | Stop reason: HOSPADM

## 2023-10-23 RX ORDER — POTASSIUM CHLORIDE 20 MEQ/1
40 TABLET, EXTENDED RELEASE ORAL ONCE
Status: COMPLETED | OUTPATIENT
Start: 2023-10-23 | End: 2023-10-23

## 2023-10-23 RX ADMIN — POTASSIUM CHLORIDE 40 MEQ: 1500 TABLET, EXTENDED RELEASE ORAL at 09:33

## 2023-10-23 RX ADMIN — ACETAMINOPHEN 650 MG: 325 TABLET, FILM COATED ORAL at 06:08

## 2023-10-23 RX ADMIN — ASPIRIN 325 MG ORAL TABLET 975 MG: 325 PILL ORAL at 20:46

## 2023-10-23 RX ADMIN — COLCHICINE 0.6 MG: 0.6 TABLET ORAL at 17:57

## 2023-10-23 RX ADMIN — COLCHICINE 0.6 MG: 0.6 TABLET ORAL at 00:12

## 2023-10-23 RX ADMIN — COLCHICINE 0.6 MG: 0.6 TABLET ORAL at 08:12

## 2023-10-23 RX ADMIN — ASPIRIN 325 MG ORAL TABLET 975 MG: 325 PILL ORAL at 08:12

## 2023-10-23 RX ADMIN — PERFLUTREN 0.8 ML/MIN: 6.52 INJECTION, SUSPENSION INTRAVENOUS at 12:00

## 2023-10-23 RX ADMIN — ASPIRIN 325 MG ORAL TABLET 975 MG: 325 PILL ORAL at 00:12

## 2023-10-23 RX ADMIN — ASPIRIN 325 MG ORAL TABLET 975 MG: 325 PILL ORAL at 17:57

## 2023-10-23 NOTE — UTILIZATION REVIEW
Initial Clinical Review    Admission: Date/Time/Statement:   Admission Orders (From admission, onward)       Ordered        10/22/23 2126  INPATIENT ADMISSION  Once                          Orders Placed This Encounter   Procedures    INPATIENT ADMISSION     Standing Status:   Standing     Number of Occurrences:   1     Order Specific Question:   Level of Care     Answer:   Med Surg [16]     Order Specific Question:   Estimated length of stay     Answer:   More than 2 Midnights     Order Specific Question:   Certification     Answer:   I certify that inpatient services are medically necessary for this patient for a duration of greater than two midnights. See H&P and MD Progress Notes for additional information about the patient's course of treatment. ED Arrival Information       Expected   -    Arrival   10/22/2023 18:45    Acuity   Urgent              Means of arrival   Walk-In    Escorted by   Spouse    Service   Hospitalist    Admission type   Emergency              Arrival complaint   Chest Pain             Chief Complaint   Patient presents with    Chest Pain     Intermittent since last night around 8pm, throughout night and today. Feels it most when laying down and jaw pain at times. Initial Presentation: 34 y.o. male w/ PMH of gout, prediabetes, hyperlipidemia, obesity presented to the ED from home w/ several episodes of chest pain. Pt reports 24 episodes of chest pain over the past 24 hrs, described as substernal pressure without radiation, associated with SOB and sweating, not relived w/ Tylenol. Reports pain radiated to his jaw this pm w/c prompted him to go to the ED. In the ED, WBC- 12.75. elevated troponin. EKG with ST elevations in inferolateral leads and markedly elevated troponin. Bedside echo w/ no  regional wall motion abnormalities appreciated. Concern noted for possible myocarditis. On exam, obese, normal HR and regular rhythm. Given IV Toradol.     Admit as Inpatient for evaluation and treatment of chest pain, elevated troponin. Plan: cardiology consult. F/u TTE. start high-dose  mg 3 times daily, continue with colchicine. Check COVID-19/influenza/RSV, ESR/CRP, VIRIDIANA, RF, CCP, C3/C4. Continue telemetry monitoring. 10/22 Cardiology Consult: Chest pain with troponin elevation:  Chest pain intermittent n the past 24 hours, lasting 2-3 hours, at rest. No exertional chest pain prior to this. Currently chest pain free,   Plan: Continue aspirin and colchicine. Will check a full viral panel. Check inflammatory markers. Cardiac MRI. Follow up 2D echo. Monitor renal functions. Date: 10/23   Day 2:   IM Notes: pt reports chest pain returned this am. Resolving with tylenol and ASA/colchicine. ESR, CPR and markedly elevated. F/u cardiac MRI. Cont tele monitoring. Pain control prn. F/u VIRIDIANA, RF, CCP, C3/C4. PE: Normal HR and regular rhythm, normal breath sounds.       ED Triage Vitals [10/22/23 1851]   Temperature Pulse Respirations Blood Pressure SpO2   98.4 °F (36.9 °C) 76 16 (!) 153/102 100 %      Temp Source Heart Rate Source Patient Position - Orthostatic VS BP Location FiO2 (%)   Oral Monitor Lying Left arm --      Pain Score       6          Wt Readings from Last 1 Encounters:   10/23/23 (!) 137 kg (301 lb)     Additional Vital Signs:   Date/Time Temp Pulse Resp BP MAP (mmHg) SpO2 O2 Device Patient Position - Orthostatic VS   10/23/23 1200 -- 79 -- 142/94 -- -- -- --   10/23/23 11:20:47 97.8 °F (36.6 °C) 79 18 142/94 110 99 % None (Room air) Sitting   10/23/23 07:22:44 97.9 °F (36.6 °C) 71 18 152/98 116 99 % None (Room air) Lying   10/23/23 02:58:50 98.7 °F (37.1 °C) 63 20 126/81 96 95 % None (Room air) Lying   10/22/23 22:27:28 97.7 °F (36.5 °C) 72 -- 133/73 93 96 % -- --   10/22/23 22:27:15 97.7 °F (36.5 °C) 72 18 -- -- 96 % -- --   10/22/23 2200 -- 72 18 -- -- 97 % None (Room air) --   10/22/23 1945 -- 76 19 121/75 94 97 % -- --   10/22/23 1931 -- 74 18 145/84 109 98 % -- --   10/22/23 1913 -- -- -- -- -- -- None (Room air) --       Pertinent Labs/Diagnostic Test Results:   XR chest 2 views   Final Result by Stephanie Gonzalez MD (10/23 1123)      No acute cardiopulmonary disease. Workstation performed: [id number]         MRI cardiac  w wo contrast    (Results Pending)     10/22   EKG result: Normal sinus rhythm  RSR' or QR pattern in V1 suggests right ventricular conduction delay  Acute pericarditis    ECHO:    Left Ventricle: Left ventricular cavity size is normal. Wall thickness is not well visualized. The left ventricular ejection fraction is 55-60% by visual estimation. Systolic function is normal. Although no diagnostic regional wall motion abnormality was identified, this possibility cannot be completely excluded on the basis of this study. Right Ventricle: Systolic function is normal.    10/23  EKG result:Normal sinus rhythm  RSR' or QR pattern in V1 suggests right ventricular conduction delay  Inferior infarct (cited on or before 22-OCT-2023)    ECHO:    Left Ventricle: Left ventricular cavity size is normal. Wall thickness is mildly increased. There is concentric remodeling. Systolic function is normal. Although no diagnostic regional wall motion abnormality was identified, this possibility cannot be completely excluded on the basis of this study.  Diastolic function is normal.    Right Ventricle: Right ventricular cavity size is normal. Systolic function is normal.  Results from last 7 days   Lab Units 10/22/23  2128   SARS-COV-2  Negative     Results from last 7 days   Lab Units 10/23/23  0533 10/22/23  1903   WBC Thousand/uL 10.82* 12.75*   HEMOGLOBIN g/dL 13.3 13.0   HEMATOCRIT % 44.5 41.3   PLATELETS Thousands/uL 446* 423*   NEUTROS ABS Thousands/µL  --  8.30*         Results from last 7 days   Lab Units 10/23/23  0533 10/22/23  1903   SODIUM mmol/L 138 137   POTASSIUM mmol/L 3.5 4.2   CHLORIDE mmol/L 102 103   CO2 mmol/L 24 26   ANION GAP mmol/L 12 8   BUN mg/dL 16 17   CREATININE mg/dL 1.24 1.39*   EGFR ml/min/1.73sq m 72 62   CALCIUM mg/dL 9.2 8.9     Results from last 7 days   Lab Units 10/22/23  1903   AST U/L 70*   ALT U/L 39   ALK PHOS U/L 111*   TOTAL PROTEIN g/dL 7.7   ALBUMIN g/dL 3.9   TOTAL BILIRUBIN mg/dL 0.43         Results from last 7 days   Lab Units 10/23/23  0533 10/22/23  1903   GLUCOSE RANDOM mg/dL 87 115             Results from last 7 days   Lab Units 10/23/23  0018 10/22/23  2101 10/22/23  1903   HS TNI 0HR ng/L  --   --  13,522*   HS TNI 2HR ng/L  --  18,485*  --    HSTNI D2 ng/L  --  4,963*  --    HS TNI 4HR ng/L 15,892*  --   --    HSTNI D4 ng/L 2,370*  --   --              Results from last 7 days   Lab Units 10/22/23  1903   CRP mg/L 172.6*   SED RATE mm/hour 119*                 Results from last 7 days   Lab Units 10/22/23  2128   INFLUENZA A PCR  Negative   INFLUENZA B PCR  Negative   RSV PCR  Negative       ED Treatment:   Medication Administration from 10/22/2023 1845 to 10/22/2023 2217         Date/Time Order Dose Route Action Action by Comments     10/22/2023 1922 EDT ketorolac (TORADOL) injection 15 mg 15 mg Intravenous Given Donn Warner RN --          Past Medical History:   Diagnosis Date    Arthritis      Present on Admission:   Chest pain   Elevated troponin   Acute gout of left wrist      Admitting Diagnosis: Pericarditis [I31.9]  Chest pain [R07.9]  Elevated troponin [R79.89]  Age/Sex: 34 y.o. male  Admission Orders:  SCD  Telemetry monitoring    Scheduled Medications:  aspirin, 975 mg, Oral, TID  colchicine, 0.6 mg, Oral, BID  enoxaparin, 40 mg, Subcutaneous, Daily  pantoprazole, 40 mg, Oral, Early Morning      Continuous IV Infusions: none     PRN Meds:  acetaminophen, 650 mg, Oral, Q6H PRN 10/23 x 1  ondansetron, 4 mg, Intravenous, Q6H PRN        IP CONSULT TO CARDIOLOGY    Network Utilization Review Department  ATTENTION: Please call with any questions or concerns to [phone] and carefully listen to the prompts so that you are directed to the right person. All voicemails are confidential.   For Discharge needs, contact Care Management DC Support Team at [phone] opt. 2  Send all requests for admission clinical reviews, approved or denied determinations and any other requests to dedicated fax number below belonging to the campus where the patient is receiving treatment.  List of dedicated fax numbers for the Facilities:  [place] DENIALS (Administrative/Medical Necessity) [phone]   DISCHARGE SUPPORT TEAM (NETWORK) [id number] (Maternity/NICU/Pediatrics) [phone]   [address] [facility] 1000 [facility] [phone]   [address] [facility] 207 [facility] [address] [address] [facility] [address] [facility] [place], [place] [address] [phone]

## 2023-10-23 NOTE — ASSESSMENT & PLAN NOTE
1 day history of intermittent chest pain with radiation into jaw prompting presentation   EKG with multiple ST changes, troponin markedly elevated on presentation. Of note chest pain has resolved following 15 mg IV Toradol  Cardiology following, appreciate recommendations.     For now we will start high-dose  mg 3 times daily, continue with colchicine  Follow-up TTE  Check COVID-19/influenza/RSV, ESR/CRP, VIRIDIANA, RF, CCP, C3/C4  Continue telemetry monitoring

## 2023-10-23 NOTE — ASSESSMENT & PLAN NOTE
1 day history of intermittent chest pain with radiation into jaw prompting presentation   EKG with multiple ST changes/elevations, troponin markedly elevated on presentation. Of note chest pain has resolved following 15 mg IV Toradol  Cardiology following, appreciate recommendations.   Feel this likely represents myopericarditis   For now we will start high-dose  mg 3 times daily, continue with colchicine  Follow-up TTE   Follow up cardiac MRI   COVID/flu/RSV negative   ESR, CPR and markedly elevated   Check VIRIDIANA, RF, CCP, C3/C4  Continue telemetry monitoring

## 2023-10-23 NOTE — H&P
[address] [facility]  H&P  Name: Chris Chiu 34 y.o. male I MRN: [id number]  Unit/Bed#: [facility] 525-01 I Date of Admission: 10/22/2023   Date of Service: 10/22/2023 I Hospital Day: 0      Assessment/Plan   * Chest pain  Assessment & Plan  1 day history of intermittent chest pain with radiation into jaw prompting presentation   EKG with multiple ST changes, troponin markedly elevated on presentation. Of note chest pain has resolved following 15 mg IV Toradol  Cardiology following, appreciate recommendations. For now we will start high-dose  mg 3 times daily, continue with colchicine  Follow-up TTE  Check COVID-19/influenza/RSV, ESR/CRP, VIRIDIANA, RF, CCP, C3/C4  Continue telemetry monitoring    Elevated troponin  Assessment & Plan  Plan as per primary problem    Acute gout of left wrist  Assessment & Plan  Patient will be on colchicine and high-dose ASA as above for suspected myocarditis         VTE Prophylaxis: Enoxaparin (Lovenox)  / sequential compression device   Code Status: Full code  POLST: POLST form is not discussed and not completed at this time. Discussion with family: Wife at bedside    Anticipated Length of Stay:  Patient will be admitted on an Inpatient basis with an anticipated length of stay of greater than 2 midnights. Justification for Hospital Stay: Please see detailed plans noted above. Chief Complaint:     Chest pain  History of Present Illness:  Chris Chiu is a 34 y.o. male who has a past medical history significant for gout, prediabetes, hyperlipidemia, obesity presenting with several episodes of chest pain. He states he has had 3 episodes of chest pain over the past 24 hours described as a substernal pressure without radiation, associated with shortness of breath and sweating without nausea or vomiting and not relieved with Tylenol although bending forward did help.   This afternoon he had episode while lying back which radiated into jaw which prompted the presentation here. Denies any prior episodes of chest pain or personal history of cardiac disease. States he is recovering from a recent viral URI and endorses sick contacts of his children, additionally reports recent gout flare for which he is taking colchicine and diclofenac. During ED evaluation he received IV Toradol with improvement in pain. His work-up was significant for EKG with ST elevations in inferolateral leads and markedly elevated troponin. He was evaluated by cardiology fellow at bedside, who performed bedside echocardiogram reportedly with difficult windows but no regional wall motion abnormalities appreciated. Concern noted for possible myocarditis, and particularly given scenario he is admitted for further work-up and management. Review of Systems:    Constitutional:  Denies fever or chills   Eyes:  Denies change in visual acuity   HENT:  Denies nasal congestion or sore throat   Respiratory:  Denies cough or shortness of breath   Cardiovascular:  Denies edema but reported chest pain  GI:  Denies abdominal pain, nausea, vomiting, bloody stools or diarrhea   :  Denies dysuria   Musculoskeletal:  Denies back pain or joint pain   Integument:  Denies rash   Neurologic:  Denies headache, focal weakness or sensory changes   Endocrine:  Denies polyuria or polydipsia   Lymphatic:  Denies swollen glands   Psychiatric:  Denies depression or anxiety     Past Medical and Surgical History:   Past Medical History:   Diagnosis Date    Arthritis      History reviewed. No pertinent surgical history. Meds/Allergies:  Medications Prior to Admission   Medication    colchicine (COLCRYS) 0.6 mg tablet    diclofenac (VOLTAREN) 75 mg EC tablet       Allergies:    Allergies   Allergen Reactions    Gabapentin     Zyloprim [Allopurinol] Myalgia     History:  Marital Status: /Civil Union     Substance Use History:   Social History     Substance and Sexual Activity   Alcohol Use No     Social History     Tobacco Use   Smoking Status Never   Smokeless Tobacco Never     Social History     Substance and Sexual Activity   Drug Use Never       Family History:  Family History   Problem Relation Age of Onset    Hypertension Mother     No Known Problems Father        Physical Exam:     Vitals:   Blood Pressure: 133/73 (10/22/23 2227)  Pulse: 72 (10/22/23 2227)  Temperature: 97.7 °F (36.5 °C) (10/22/23 2227)  Temp Source: Oral (10/22/23 1851)  Respirations: 18 (10/22/23 2227)  Height: 6' 3" (190.5 cm) (10/22/23 2224)  Weight - Scale: (!) 137 kg (301 lb 9.4 oz) (10/22/23 2224)  SpO2: 96 % (10/22/23 2227)    Constitutional:  Well developed, obese, no acute distress, non-toxic appearance   Eyes:  PERRL, conjunctiva normal   HENT:  Atraumatic, external ears normal, nose normal, oropharynx moist, no pharyngeal exudates. Neck- normal range of motion, no tenderness, supple   Respiratory:  No respiratory distress, normal breath sounds, no rales, no wheezing   Cardiovascular:  Normal rate, normal rhythm, no murmurs, no gallops, no rubs   GI:  Soft, nondistended, normal bowel sounds, nontender, no organomegaly, no mass, no rebound, no guarding   :  No costovertebral angle tenderness   Musculoskeletal:  No edema, no tenderness, no deformities. Back- no tenderness  Integument:  Well hydrated, no rash   Lymphatic:  No lymphadenopathy noted   Neurologic:  Alert &awake, communicative, CN 2-12 normal, normal motor function, normal sensory function, no focal deficits noted   Psychiatric:  Speech and behavior appropriate       Lab Results: I have personally reviewed pertinent reports.       Results from last 7 days   Lab Units 10/22/23  1903   WBC Thousand/uL 12.75*   HEMOGLOBIN g/dL 13.0   HEMATOCRIT % 41.3   PLATELETS Thousands/uL 423*   NEUTROS PCT % 65   LYMPHS PCT % 21   MONOS PCT % 10   EOS PCT % 2     Results from last 7 days   Lab Units 10/22/23  1903   POTASSIUM mmol/L 4.2   CHLORIDE mmol/L 103   CO2 mmol/L 26   BUN mg/dL 17   CREATININE mg/dL 1.39*   CALCIUM mg/dL 8.9   ALK PHOS U/L 111*   ALT U/L 39   AST U/L 70*           EKG: Normal sinus rhythm HR 75, apparent ST elevations in inferolateral leads    Imaging: I have personally reviewed pertinent films in PACS    CXR: Personally reviewed, no acute cardiopulmonary disease visualized. Final radiology read is pending. ** Please Note: Dragon 360 Dictation voice to text software was used in the creation of this document.  **

## 2023-10-23 NOTE — CONSULTS
Consultation - Cardiology   John Bearden 34 y.o. male MRN: [id number]  Unit/Bed#: ED 16 Encounter: [id number]    Inpatient consult to Cardiology  Consult performed by: Azucena Ceron MD  Consult ordered by: Nadeen Kc MD        History of Present Illness   Physician Requesting Consult: Nadeen Kc MD  Reason for Consult / Principal Problem: Chest pain, elevated troponin      Assessment:  Active Problems: There are no active Hospital Problems. Assessment/ Plan:    Chest pain with troponin elevation, likely non MI  Intermittent, in the past 24 hours, lasting 2-3 hours, at rest  No exertional chest pain prior to this  CAD risk factors: BMI 38, , HBA1C 5.8  EKG with ST elevations in 2,3,aVF, V4,5,6  Troponin 41971  History of viral prodrome and sick contacts 3 days prior  Bedside echo shows a preserved EF, no obvious RWMA, pericardial effusion  Currently pain free  History if suggestive of myopericarditis, given absent RWMA, and symptomatology, CAD is less likely    Elevated creatinine  1.39, however appears to be at baseline    Hyperlipidemia  Diet controlled    Pre diabetes  HBA1C 5.8    BMI 38.62  History of gout    Plan  Continue aspirin and colchicine  Will check a full viral panel  Check inflammatory markers  Cardiac MRI  Follow up 2D echo  Monitor renal functions    Discussed above plan with Dr. Carroll Aiken and Dr. Lauren Jolly     HPI: John Bearden is a 34y.o. year old male who has a history of gout with recurrent acute exacerbations, hyperlipidemia, pre diabetes, BMI 38.62, presented with chest pain, onset 24 hours ago (8pm last night). He first experienced this when he was laying in bed, a substernal, pressure like, no radiation, associated with shortness of breath, diaphoresis. No nausea or vomiting. No relieving factors, tylenol did not help, bending forward did not help. Lasted about 2-3 hours.  Since then he had 2 other episodes lasting a few hours each time, while resting. Last episode was at 5 pm. Each time he felt the pain, he was laying back. Patient stated that he had fever and chills on 10/19. He further has sick contacts (his kids, and works in ). On presentation to the ED, he was slightly hypertensive with a BP of 153/102, rest of the VS were wnl. BP improved after improvement of pain. Labs are significant for leukocytosis [12.75], creatinine was elevated at 1.30. EKG shows ST elevations in infero [place] [address]. Patient received IV toradol with improvement of symptoms. No prior history of heart disease, he does no know his fathers medical history, but no other 1st degree members with heart disease. He is a non smoker, and he does not drink alcohol. Review of Systems   Constitutional: Negative. HENT: Negative. Eyes: Negative. Cardiovascular:  Positive for chest pain. Negative for dyspnea on exertion, irregular heartbeat, leg swelling, near-syncope, orthopnea, palpitations, paroxysmal nocturnal dyspnea and syncope. Respiratory: Negative. Negative for shortness of breath and sleep disturbances due to breathing. Skin: Negative. Musculoskeletal: Negative. Genitourinary: Negative. Neurological: Negative. All other systems reviewed and are negative. Historical Information   Past Medical History:   Diagnosis Date    Arthritis      No past surgical history on file.   Social History     Substance and Sexual Activity   Alcohol Use No     Social History     Substance and Sexual Activity   Drug Use Never     Social History     Tobacco Use   Smoking Status Never   Smokeless Tobacco Never     Family History:   Family History   Problem Relation Age of Onset    Hypertension Mother     No Known Problems Father        Meds/Allergies   all current active meds have been reviewed, current meds:   Current Facility-Administered Medications   Medication Dose Route Frequency    acetaminophen (TYLENOL) tablet 650 mg  650 mg Oral Q6H PRN    aspirin tablet 975 mg  975 mg Oral TID    colchicine (COLCRYS) tablet 0.6 mg  0.6 mg Oral BID    [START ON 10/23/2023] enoxaparin (LOVENOX) subcutaneous injection 40 mg  40 mg Subcutaneous Daily    ondansetron (ZOFRAN) injection 4 mg  4 mg Intravenous Q6H PRN   , and PTA meds:   Prior to Admission Medications   Prescriptions Last Dose Informant Patient Reported? Taking?   colchicine (COLCRYS) 0.6 mg tablet   No No   Sig: TAKE 2 TABLETS BY MOUTH AT ONSET AND THEN ONCE DAILY FOR 4 MORE DAYS AS NEEDED FOR GOUT FLARES   diclofenac (VOLTAREN) 75 mg EC tablet  Self No No   Sig: Take 1 tablet (75 mg total) by mouth 2 (two) times a day      Facility-Administered Medications: None     No current facility-administered medications for this encounter. Allergies   Allergen Reactions    Gabapentin     Zyloprim [Allopurinol] Myalgia       Objective   Vitals: Blood pressure 121/75, pulse 76, temperature 98.4 °F (36.9 °C), temperature source Oral, resp. rate 19, SpO2 97 %. , There is no height or weight on file to calculate BMI.,   Orthostatic Blood Pressures      Flowsheet Row Most Recent Value   Blood Pressure 121/75 filed at 10/22/2023 1945   Patient Position - Orthostatic VS Lying filed at 10/22/2023 1106          Systolic (00WFD), YVF:384 , Min:121 , TDR:740     Diastolic (80CPT), ZLX:43, Min:75, Max:102    No intake or output data in the 24 hours ending 10/22/23 2057    Weight (last 2 days)       None            Invasive Devices       Peripheral Intravenous Line  Duration             Peripheral IV 10/22/23 Distal;Upper;Ventral (anterior); Right Arm <1 day                        Physical Exam: /73   Pulse 72   Temp 97.7 °F (36.5 °C)   Resp 18   Ht 6' 3" (1.905 m)   Wt (!) 137 kg (301 lb 9.4 oz)   SpO2 96%   BMI 37.70 kg/m²     General Appearance:    Alert, cooperative, no distress, appears stated age   Head:    Normocephalic, without obvious abnormality, atraumatic   Eyes:    PERRL, conjunctiva/corneas clear, EOM's intact, fundi     benign, both eyes        Nose:   Nares normal, septum midline, mucosa normal, no drainage    or sinus tenderness   Throat:   Lips, mucosa, and tongue normal; teeth and gums normal   Neck:   Supple, symmetrical, trachea midline, no adenopathy;        thyroid:  No enlargement/tenderness/nodules; no carotid    bruit or JVD   Back:     Symmetric, no curvature, ROM normal, no CVA tenderness   Lungs:     Clear to auscultation bilaterally, respirations unlabored   Chest wall:    No tenderness or deformity   Heart:    Regular rate and rhythm, S1 and S2 normal, no murmur, rub   or gallop   Abdomen:     Soft, non-tender, bowel sounds active all four quadrants,     no masses, no organomegaly   Extremities:   Extremities normal, atraumatic, no cyanosis or edema   Pulses:   2+ and symmetric all extremities   Skin:   Skin color, texture, turgor normal, no rashes or lesions           Laboratory Results:        CBC with diff:   Results from last 7 days   Lab Units 10/22/23  1903   WBC Thousand/uL 12.75*   HEMOGLOBIN g/dL 13.0   HEMATOCRIT % 41.3   MCV fL 78*   PLATELETS Thousands/uL 423*   RBC Million/uL 5.29   MCH pg 24.6*   MCHC g/dL 31.5   RDW % 15.8*   MPV fL 9.3   NRBC AUTO /100 WBCs 0         CMP:  Results from last 7 days   Lab Units 10/22/23  1903   POTASSIUM mmol/L 4.2   CHLORIDE mmol/L 103   CO2 mmol/L 26   BUN mg/dL 17   CREATININE mg/dL 1.39*   CALCIUM mg/dL 8.9   AST U/L 70*   ALT U/L 39   ALK PHOS U/L 111*   EGFR ml/min/1.73sq m 62         BMP:  Results from last 7 days   Lab Units 10/22/23  1903   POTASSIUM mmol/L 4.2   CHLORIDE mmol/L 103   CO2 mmol/L 26   BUN mg/dL 17   CREATININE mg/dL 1.39*   CALCIUM mg/dL 8.9       BNP:  No results for input(s): "BNP" in the last 72 hours. Magnesium:       Coags:       TSH:       Hemoglobin A1C       Lipid Profile:         Cardiac testing:   No results found for this or any previous visit.     No results found for this or any previous visit. No results found for this or any previous visit. No results found for this or any previous visit. Imaging: I have personally reviewed pertinent reports. No results found.     EKG reviewed personally:       Telemetry reviewed personally: NSR        Code Status: No Order

## 2023-10-23 NOTE — PROGRESS NOTES
[facility]  Progress Note  Name: Charleen Brunner  MRN: [id number]  Unit/Bed#: [facility] 525-01 I Date of Admission: 10/22/2023   Date of Service: 10/23/2023 I Hospital Day: 1    Assessment/Plan   * Chest pain  Assessment & Plan  1 day history of intermittent chest pain with radiation into jaw prompting presentation   EKG with multiple ST changes/elevations, troponin markedly elevated on presentation. Of note chest pain has resolved following 15 mg IV Toradol  Cardiology following, appreciate recommendations. Feel this likely represents myopericarditis   For now we will start high-dose  mg 3 times daily, continue with colchicine  Follow-up TTE   Follow up cardiac MRI   COVID/flu/RSV negative   ESR, CPR and markedly elevated   Check VIRIDIANA, RF, CCP, C3/C4  Continue telemetry monitoring    Elevated troponin  Assessment & Plan  Plan as per primary problem    Acute gout of left wrist  Assessment & Plan  Patient will be on colchicine and high-dose ASA as above for suspected myocarditis           VTE Pharmacologic Prophylaxis: VTE Score: 3 Moderate Risk (Score 3-4) - Pharmacological DVT Prophylaxis Ordered: enoxaparin (Lovenox). Patient Centered Rounds: I performed bedside rounds with nursing staff today. Discussions with Specialists or Other Care Team Provider: RN, cardiology     Education and Discussions with Family / Patient: Patient declined call to . Total Time Spent on Date of Encounter in care of patient: 25 mins. This time was spent on one or more of the following: performing physical exam; counseling and coordination of care; obtaining or reviewing history; documenting in the medical record; reviewing/ordering tests, medications or procedures; communicating with other healthcare professionals and discussing with patient's family/caregivers.     Current Length of Stay: 1 day(s)  Current Patient Status: Inpatient   Certification Statement: The patient will continue to require additional inpatient hospital stay due to chest pain/elevated troponin work up   Discharge Plan: Anticipate discharge in 24-48 hrs to home. Code Status: Level 1 - Full Code    Subjective:   Pt reports CP went away after having Toradol but started coming back again this morning. Resolving with tylenol and ASA/colchicine. No associated SOB, n/v.      Objective:     Vitals:   Temp (24hrs), Av.1 °F (36.7 °C), Min:97.7 °F (36.5 °C), Max:98.7 °F (37.1 °C)    Temp:  [97.7 °F (36.5 °C)-98.7 °F (37.1 °C)] 97.9 °F (36.6 °C)  HR:  [63-76] 71  Resp:  [16-20] 18  BP: (121-153)/() 152/98  SpO2:  [95 %-100 %] 99 %  Body mass index is 37.7 kg/m². Input and Output Summary (last 24 hours):   No intake or output data in the 24 hours ending 10/23/23 0851    Physical Exam:   Physical Exam  Vitals reviewed. Constitutional:       General: He is not in acute distress. Appearance: He is not toxic-appearing. HENT:      Head: Normocephalic and atraumatic. Eyes:      Extraocular Movements: Extraocular movements intact. Cardiovascular:      Rate and Rhythm: Normal rate and regular rhythm. Pulmonary:      Effort: Pulmonary effort is normal.      Breath sounds: Normal breath sounds. Abdominal:      General: Bowel sounds are normal. There is no distension. Palpations: Abdomen is soft. Tenderness: There is no abdominal tenderness. Musculoskeletal:         General: Normal range of motion. Neurological:      General: No focal deficit present. Mental Status: He is alert and oriented to person, place, and time. Psychiatric:         Mood and Affect: Mood normal.         Behavior: Behavior normal.         Thought Content:  Thought content normal.         Additional Data:     Labs:  Results from last 7 days   Lab Units 10/23/23  0533 10/22/23  1903   WBC Thousand/uL 10.82* 12.75*   HEMOGLOBIN g/dL 13.3 13.0   HEMATOCRIT % 44.5 41.3   PLATELETS Thousands/uL 446* 423*   NEUTROS PCT %  --  65   LYMPHS PCT %  --  21   MONOS PCT %  --  10   EOS PCT %  --  2     Results from last 7 days   Lab Units 10/23/23  0533 10/22/23  1903   SODIUM mmol/L 138 137   POTASSIUM mmol/L 3.5 4.2   CHLORIDE mmol/L 102 103   CO2 mmol/L 24 26   BUN mg/dL 16 17   CREATININE mg/dL 1.24 1.39*   ANION GAP mmol/L 12 8   CALCIUM mg/dL 9.2 8.9   ALBUMIN g/dL  --  3.9   TOTAL BILIRUBIN mg/dL  --  0.43   ALK PHOS U/L  --  111*   ALT U/L  --  39   AST U/L  --  70*   GLUCOSE RANDOM mg/dL 87 115                       Lines/Drains:  Invasive Devices       Peripheral Intravenous Line  Duration             Peripheral IV 10/22/23 Distal;Upper;Ventral (anterior); Right Arm <1 day                      Telemetry:  Telemetry Orders (From admission, onward)               24 Hour Telemetry Monitoring  Continuous x 24 Hours (Telem)        Question:  Reason for 24 Hour Telemetry  Answer:  Patients with migue/chanelle/endocarditis; cardiac contusion                     Telemetry Reviewed:  not conncted   Indication for Continued Telemetry Use: Migue/chanelle/endocarditis             Imaging: No pertinent imaging reviewed. Recent Cultures (last 7 days):         Last 24 Hours Medication List:   Current Facility-Administered Medications   Medication Dose Route Frequency Provider Last Rate    acetaminophen  650 mg Oral Q6H PRN Otdaniel Ely, DO      aspirin  975 mg Oral TID Otdaniel Rodríguez, DO      colchicine  0.6 mg Oral BID Otdaniel Ely, DO      enoxaparin  40 mg Subcutaneous Daily Otdaniel Elkenny, DO      ondansetron  4 mg Intravenous Q6H PRN Tristan Elkenny, DO      potassium chloride  40 mEq Oral Once [employer], PA-C          Today, Patient Was Seen By: [employer], PA-C    **Please Note: This note may have been constructed using a voice recognition system. **

## 2023-10-24 ENCOUNTER — APPOINTMENT (OUTPATIENT)
Dept: RADIOLOGY | Facility: HOSPITAL | Age: 38
DRG: 316 | End: 2023-10-24
Payer: COMMERCIAL

## 2023-10-24 VITALS
WEIGHT: 301 LBS | DIASTOLIC BLOOD PRESSURE: 80 MMHG | BODY MASS INDEX: 37.42 KG/M2 | TEMPERATURE: 98.5 F | SYSTOLIC BLOOD PRESSURE: 122 MMHG | HEART RATE: 80 BPM | HEIGHT: 75 IN | OXYGEN SATURATION: 96 % | RESPIRATION RATE: 18 BRPM

## 2023-10-24 LAB
ANION GAP SERPL CALCULATED.3IONS-SCNC: 6 MMOL/L
ATRIAL RATE: 75 BPM
BASOPHILS # BLD AUTO: 0.1 THOUSANDS/ÂΜL (ref 0–0.1)
BASOPHILS NFR BLD AUTO: 1 % (ref 0–1)
BUN SERPL-MCNC: 16 MG/DL (ref 5–25)
CALCIUM SERPL-MCNC: 9 MG/DL (ref 8.4–10.2)
CCP AB SER IA-ACNC: 1
CHLORIDE SERPL-SCNC: 106 MMOL/L (ref 96–108)
CO2 SERPL-SCNC: 27 MMOL/L (ref 21–32)
CREAT SERPL-MCNC: 1.15 MG/DL (ref 0.6–1.3)
EOSINOPHIL # BLD AUTO: 0.38 THOUSAND/ÂΜL (ref 0–0.61)
EOSINOPHIL NFR BLD AUTO: 4 % (ref 0–6)
ERYTHROCYTE [DISTWIDTH] IN BLOOD BY AUTOMATED COUNT: 15.9 % (ref 11.6–15.1)
GFR SERPL CREATININE-BSD FRML MDRD: 79 ML/MIN/1.73SQ M
GLUCOSE SERPL-MCNC: 97 MG/DL (ref 65–140)
HCT VFR BLD AUTO: 41.2 % (ref 36.5–49.3)
HGB BLD-MCNC: 12.7 G/DL (ref 12–17)
IMM GRANULOCYTES # BLD AUTO: 0.17 THOUSAND/UL (ref 0–0.2)
IMM GRANULOCYTES NFR BLD AUTO: 2 % (ref 0–2)
LYMPHOCYTES # BLD AUTO: 2.54 THOUSANDS/ÂΜL (ref 0.6–4.47)
LYMPHOCYTES NFR BLD AUTO: 24 % (ref 14–44)
MCH RBC QN AUTO: 24.6 PG (ref 26.8–34.3)
MCHC RBC AUTO-ENTMCNC: 30.8 G/DL (ref 31.4–37.4)
MCV RBC AUTO: 80 FL (ref 82–98)
MONOCYTES # BLD AUTO: 1.02 THOUSAND/ÂΜL (ref 0.17–1.22)
MONOCYTES NFR BLD AUTO: 10 % (ref 4–12)
NEUTROPHILS # BLD AUTO: 6.25 THOUSANDS/ÂΜL (ref 1.85–7.62)
NEUTS SEG NFR BLD AUTO: 59 % (ref 43–75)
NRBC BLD AUTO-RTO: 0 /100 WBCS
P AXIS: 70 DEGREES
PLATELET # BLD AUTO: 429 THOUSANDS/UL (ref 149–390)
PMV BLD AUTO: 9 FL (ref 8.9–12.7)
POTASSIUM SERPL-SCNC: 4.8 MMOL/L (ref 3.5–5.3)
PR INTERVAL: 174 MS
QRS AXIS: 63 DEGREES
QRSD INTERVAL: 112 MS
QT INTERVAL: 362 MS
QTC INTERVAL: 404 MS
RBC # BLD AUTO: 5.16 MILLION/UL (ref 3.88–5.62)
SODIUM SERPL-SCNC: 139 MMOL/L (ref 135–147)
T WAVE AXIS: 57 DEGREES
VENTRICULAR RATE: 75 BPM
WBC # BLD AUTO: 10.46 THOUSAND/UL (ref 4.31–10.16)

## 2023-10-24 PROCEDURE — 99232 SBSQ HOSP IP/OBS MODERATE 35: CPT | Performed by: INTERNAL MEDICINE

## 2023-10-24 PROCEDURE — 93010 ELECTROCARDIOGRAM REPORT: CPT | Performed by: INTERNAL MEDICINE

## 2023-10-24 PROCEDURE — 99239 HOSP IP/OBS DSCHRG MGMT >30: CPT | Performed by: INTERNAL MEDICINE

## 2023-10-24 PROCEDURE — 85025 COMPLETE CBC W/AUTO DIFF WBC: CPT | Performed by: INTERNAL MEDICINE

## 2023-10-24 PROCEDURE — A9585 GADOBUTROL INJECTION: HCPCS | Performed by: INTERNAL MEDICINE

## 2023-10-24 PROCEDURE — 80048 BASIC METABOLIC PNL TOTAL CA: CPT | Performed by: INTERNAL MEDICINE

## 2023-10-24 PROCEDURE — 75561 CARDIAC MRI FOR MORPH W/DYE: CPT

## 2023-10-24 RX ORDER — PANTOPRAZOLE SODIUM 40 MG/1
40 TABLET, DELAYED RELEASE ORAL
Qty: 30 TABLET | Refills: 0 | Status: SHIPPED | OUTPATIENT
Start: 2023-10-25 | End: 2023-11-24

## 2023-10-24 RX ORDER — ASPIRIN 325 MG
975 TABLET ORAL 3 TIMES DAILY
Qty: 126 TABLET | Refills: 0 | Status: SHIPPED | OUTPATIENT
Start: 2023-10-24 | End: 2023-11-07

## 2023-10-24 RX ORDER — COLCHICINE 0.6 MG/1
0.6 TABLET ORAL 2 TIMES DAILY
Qty: 60 TABLET | Refills: 0 | Status: SHIPPED | OUTPATIENT
Start: 2023-10-24 | End: 2023-11-23

## 2023-10-24 RX ORDER — GADOBUTROL 604.72 MG/ML
26 INJECTION INTRAVENOUS
Status: COMPLETED | OUTPATIENT
Start: 2023-10-24 | End: 2023-10-24

## 2023-10-24 RX ADMIN — PANTOPRAZOLE SODIUM 40 MG: 40 TABLET, DELAYED RELEASE ORAL at 05:26

## 2023-10-24 RX ADMIN — ASPIRIN 325 MG ORAL TABLET 975 MG: 325 PILL ORAL at 09:06

## 2023-10-24 RX ADMIN — COLCHICINE 0.6 MG: 0.6 TABLET ORAL at 09:06

## 2023-10-24 RX ADMIN — GADOBUTROL 26 ML: 604.72 INJECTION INTRAVENOUS at 04:46

## 2023-10-24 NOTE — PROGRESS NOTES
[facility]  Progress Note  Name: Poly Sepulveda  MRN: [id number]  Unit/Bed#: [facility] 525-01 I Date of Admission: 10/22/2023   Date of Service: 10/24/2023 I Hospital Day: 2    Assessment/Plan   * Chest pain  Assessment & Plan  Patient with chest pain  Work-up including cardiac MRI revealed myopericarditis  Now on aspirin 9 7 5 mg 3 times daily for 2 weeks colchicine  Cardiology inputs noted  Cardiology plans for close outpatient follow-up noted  Outpatient cardiology follow-up    Acute gout of left wrist  Assessment & Plan  Presently on colchicine  Outpatient follow-up                 Discharge Summary - [facility]    Patient Information: Swetha Funes 34 y.o. male MRN: [id number]  Unit/Bed#: [facility] 525-01 Encounter: [id number]    Discharging Physician / Practitioner: Mingo Rodriguez MD  PCP: Pee Hurley PA-C  Admission Date: 10/22/2023  Discharge Date: 10/24/23    Disposition:     Home    Reason for Admission: Chest pain    Discharge Diagnoses:     Principal Problem:    Chest pain  Active Problems:    Acute gout of left wrist    Elevated troponin  Resolved Problems:    * No resolved hospital problems. *      Consultations During Hospital Stay:  Cardiology    Procedures Performed:     Cardiac MRI -myopericarditis  2D echo LV systolic function normal  Chest x-ray no active lung disease      Hospital Course:     Swetha Funes is a 34 y.o. male patient who originally presented to the hospital on 10/22/2023 due to chest pain. He was seen in consultation with cardiology, work-up including cardiac MRI revealed features of myopericarditis. He is on aspirin 975 mg 3 times daily, colchicine. He reports symptomatic improvement he is ambulating the room and hallways. Cardiology plans for close outpatient follow-up noted. He symptomatically improved hemodynamically stable and is deemed ready for discharge today.   Kindly review the chart for details. Condition at Discharge: fair     Discharge Day Visit / Exam:     Subjective:      Comfortably sitting up in bed  Reports feeling better  Agreeable to discharge plan  History chart labs medications reviewed      Vitals: Blood Pressure: 122/80 (10/24/23 1101)  Pulse: 80 (10/24/23 1101)  Temperature: 98.5 °F (36.9 °C) (10/24/23 1101)  Temp Source: Oral (10/24/23 1101)  Respirations: 18 (10/24/23 1101)  Height: 6' 3" (190.5 cm) (10/23/23 1200)  Weight - Scale: (!) 137 kg (301 lb) (10/23/23 1200)  SpO2: 96 % (10/24/23 1101)  Exam:   Physical Exam    Comfortably sitting up in bed  Obese  Short thick neck  Lungs diminished breath sounds bilateral  Vesicular breath sounds  Heart sounds S1 and S2 noted  Abdomen soft nontender  Abdominal obesity noted  Awake obey simple commands  No pedal Ouma  No rash    Discharge instructions/Information to patient and family:   See after visit summary for information provided to patient and family. Discharge plan discussed with the patient, family at bedside updated  Outpatient follow-up with cardiology, primary care physician    Provisions for Follow-Up Care:  See after visit summary for information related to follow-up care and any pertinent home health orders. Planned Readmission: no     Discharge Statement:  I spent 43 minutes discharging the patient. This time was spent on the day of discharge. I had direct contact with the patient on the day of discharge. Greater than 50% of the total time was spent examining patient, answering all patient questions, arranging and discussing plan of care with patient as well as directly providing post-discharge instructions. Additional time then spent on discharge activities. Discharge Medications:  See after visit summary for reconciled discharge medications provided to patient and family.       ** Please Note: This note has been constructed using a voice recognition system **

## 2023-10-24 NOTE — PROGRESS NOTES
Cardiology Progress Note - Mena Lehman 34 y.o. male MRN: [id number]    Unit/Bed#: [facility] 525-01 Encounter: [id number]      Assessment:  Principal Problem:    Chest pain  Active Problems:    Acute gout of left wrist    Elevated troponin      Plan:  Patient with no issues overnight. He has no chest pain or significant dyspnea. He is in sinus rhythm on telemetry. Preliminary interpretation of cardiac MRI suggestive of subepicardial inferior and lateral inflammation from myopericarditis. Would recommend continuation of aspirin at current dose for 2 weeks. Would continue colchicine 0.6 mg twice daily for a month and then 0.6 mg daily for 2 months. Would continue Protonix 40 mg/day during period of time he is on aspirin. Have asked the patient to rest and not participate in heavy exertional activity. He typically does yard work which I asked him to defer on. He also asked about sexual activity which I also asked him to defer on. 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 Patient will follow-up with our office as an outpatient. He is walking in the dixon without difficulty. He has been asked to call if there is a problem in the interim. He is okay for discharge planning today. Subjective:   Patient seen and examined. No significant events overnight.  negative. Objective:     Vitals: Blood pressure 120/79, pulse 84, temperature 98.1 °F (36.7 °C), temperature source Oral, resp. rate 18, height 6' 3" (1.905 m), weight (!) 137 kg (301 lb), SpO2 97 %. , Body mass index is 37.62 kg/m².,   Orthostatic Blood Pressures      Flowsheet Row Most Recent Value   Blood Pressure 120/79 filed at 10/24/2023 0707   Patient Position - Orthostatic VS Sitting filed at 10/24/2023 0707        ,      Intake/Output Summary (Last 24 hours) at 10/24/2023 0907  Last data filed at 10/24/2023 0971  Gross per 24 hour   Intake 1916 ml   Output 3325 ml   Net -1409 ml       No significant arrhythmias seen on telemetry review.        Physical Exam:    GEN: Jhonatan Eastman appears well, alert and oriented x 3, pleasant and cooperative   NECK: supple, no carotid bruits, no JVD or HJR  HEART: normal rate, regular rhythm, normal S1 and S2, no murmurs, clicks, gallops or rubs   LUNGS: clear to auscultation bilaterally; no wheezes, rales, or rhonchi   ABDOMEN: normal bowel sounds, soft, no tenderness, no distention  EXTREMITIES: peripheral pulses normal; no clubbing, cyanosis, or edema  SKIN: warm and well perfused, no suspicious lesions on exposed skin    Labs & Results:    Admission on 10/22/2023   Component Date Value    Ventricular Rate 10/22/2023 75     Atrial Rate 10/22/2023 75     WV Interval 10/22/2023 158     QRSD Interval 10/22/2023 112     QT Interval 10/22/2023 370     QTC Interval 10/22/2023 413     P Axis 10/22/2023 28     QRS Axis 10/22/2023 33     T Wave [place] 10/22/2023 54     WBC 10/22/2023 12.75 (H)     RBC 10/22/2023 5.29     Hemoglobin 10/22/2023 13.0     Hematocrit 10/22/2023 41.3     MCV 10/22/2023 78 (L)     MCH 10/22/2023 24.6 (L)     MCHC 10/22/2023 31.5     RDW 10/22/2023 15.8 (H)     MPV 10/22/2023 9.3     Platelets 76/30/2387 423 (H)     nRBC 10/22/2023 0     Neutrophils Relative 10/22/2023 65     Immat GRANS % 10/22/2023 1     Lymphocytes Relative 10/22/2023 21     Monocytes Relative 10/22/2023 10     Eosinophils Relative 10/22/2023 2     Basophils Relative 10/22/2023 1     Neutrophils Absolute 10/22/2023 8.30 (H)     Immature Grans Absolute 10/22/2023 0.10     Lymphocytes Absolute 10/22/2023 2.72     Monocytes Absolute 10/22/2023 1.26 (H)     Eosinophils Absolute 10/22/2023 0.29     Basophils Absolute 10/22/2023 0.08     Sodium 10/22/2023 137     Potassium 10/22/2023 4.2     Chloride 10/22/2023 103     CO2 10/22/2023 26     ANION GAP 10/22/2023 8     BUN 10/22/2023 17     Creatinine 10/22/2023 1.39 (H)     Glucose 10/22/2023 115     Calcium 10/22/2023 8.9     AST 10/22/2023 70 (H)     ALT 10/22/2023 39     Alkaline Phosphatase 10/22/2023 111 (H)     Total Protein 10/22/2023 7.7     Albumin 10/22/2023 3.9     Total Bilirubin 10/22/2023 0.43     eGFR 10/22/2023 62     hs TnI 0hr 10/22/2023 13,522 (H)     hs TnI 2hr 10/22/2023 18,485 (H)     Delta 2hr hsTnI 10/22/2023 4,963 (H)     Ventricular Rate 10/22/2023 77     Atrial Rate 10/22/2023 77     MT Interval 10/22/2023 164     QRSD Interval 10/22/2023 112     QT Interval 10/22/2023 360     QTC Interval 10/22/2023 407     P Axis 10/22/2023 73     QRS [place] 10/22/2023 68     T Wave [place] 10/22/2023 59     hs TnI 4hr 10/23/2023 15,892 (H)     Delta 4hr hsTnI 10/23/2023 2,370 (H)     Sed Rate 10/22/2023 119 (H)     CRP 10/22/2023 172.6 (H)     VIRIDIANA 10/22/2023 Negative     Rheumatoid Factor 10/22/2023 Negative     C3 Complement 10/22/2023 189     C4, COMPLEMENT 10/22/2023 69 (H)     SARS-CoV-2 10/22/2023 Negative     INFLUENZA A PCR 10/22/2023 Negative     INFLUENZA B PCR 10/22/2023 Negative     RSV PCR 10/22/2023 Negative     A4C EF 10/23/2023 47     LVIDd 10/23/2023 5.00     LVIDS 10/23/2023 3.80     IVSd 10/23/2023 1.20     LVPWd 10/23/2023 1.00     FS 10/23/2023 24     MV E' Tissue Velocity Se* 10/23/2023 15     MV E' Tissue Velocity La* 10/23/2023 14     LA Volume Index (BP) 10/23/2023 21.5     E/A ratio 10/23/2023 1.10     E wave deceleration time 10/23/2023 230     MV Peak E Adolfo 10/23/2023 69     MV Peak A Adolfo 10/23/2023 0.63     RVID d 10/23/2023 3.5     Tricuspid annular plane * 10/23/2023 2.10     LA size 10/23/2023 4.1     LA length (A2C) 10/23/2023 6.20     LA volume (BP) 10/23/2023 56     RAA A4C 10/23/2023 14.5     MV stenosis pressure 1/2* 10/23/2023 67     MV valve area p 1/2 meth* 10/23/2023 3.28     Ao root 10/23/2023 3.30     Asc Ao 10/23/2023 3.6     Left ventricular stroke * 10/23/2023 58.00     IVS 10/23/2023 1.2     LEFT VENTRICLE SYSTOLIC * 39/63/2738 61     LV DIASTOLIC VOLUME (MOD* 10/23/2023 119     Left Atrium Area-systoli* 10/23/2023 15.6     Left Atrium Area-systoli* 10/23/2023 22.5     LVSV, 2D 10/23/2023 58     Sodium 10/23/2023 138     Potassium 10/23/2023 3.5     Chloride 10/23/2023 102     CO2 10/23/2023 24     ANION GAP 10/23/2023 12     BUN 10/23/2023 16     Creatinine 10/23/2023 1.24     Glucose 10/23/2023 87     Calcium 10/23/2023 9.2     eGFR 10/23/2023 72     WBC 10/23/2023 10.82 (H)     RBC 10/23/2023 5.40     Hemoglobin 10/23/2023 13.3     Hematocrit 10/23/2023 44.5     MCV 10/23/2023 82     MCH 10/23/2023 24.6 (L)     MCHC 10/23/2023 29.9 (L)     RDW 10/23/2023 15.9 (H)     Platelets 83/47/9200 446 (H)     MPV 10/23/2023 9.2     Ventricular Rate 10/23/2023 64     Atrial Rate 10/23/2023 64     [place] Interval 10/23/2023 170     QRSD Interval 10/23/2023 112     QT Interval 10/23/2023 404     QTC Interval 10/23/2023 416     P Axis 10/23/2023 13     QRS Axis 10/23/2023 -2     T Wave [place] 10/23/2023 23     LV EF 10/22/2023 55     Ventricular Rate 10/23/2023 76     Atrial Rate 10/23/2023 76     OH Interval 10/23/2023 170     QRSD Interval 10/23/2023 112     QT Interval 10/23/2023 378     QTC Interval 10/23/2023 425     P Axis 10/23/2023 35     QRS [place] 10/23/2023 -1     T Wave [place] 10/23/2023 10     Sodium 10/24/2023 139     Potassium 10/24/2023 4.8     Chloride 10/24/2023 106     CO2 10/24/2023 27     ANION GAP 10/24/2023 6     BUN 10/24/2023 16     Creatinine 10/24/2023 1.15     Glucose 10/24/2023 97     Calcium 10/24/2023 9.0     eGFR 10/24/2023 79     WBC 10/24/2023 10.46 (H)     RBC 10/24/2023 5.16     Hemoglobin 10/24/2023 12.7     Hematocrit 10/24/2023 41.2     MCV 10/24/2023 80 (L)     MCH 10/24/2023 24.6 (L)     MCHC 10/24/2023 30.8 (L)     RDW 10/24/2023 15.9 (H)     MPV 10/24/2023 9.0     Platelets 67/30/0599 429 (H)     nRBC 10/24/2023 0     Neutrophils Relative 10/24/2023 59     Immat GRANS % 10/24/2023 2     Lymphocytes Relative 10/24/2023 24     Monocytes Relative 10/24/2023 10     Eosinophils Relative 10/24/2023 4     Basophils Relative 10/24/2023 1     Neutrophils Absolute 10/24/2023 6.25     Immature Grans Absolute 10/24/2023 0.17     Lymphocytes Absolute 10/24/2023 2.54     Monocytes Absolute 10/24/2023 1.02     Eosinophils Absolute 10/24/2023 0.38     Basophils Absolute 10/24/2023 0.10        Echo complete w/ contrast if indicated    Result Date: 10/23/2023  Narrative:   Left Ventricle: Left ventricular cavity size is normal. Wall thickness is mildly increased. There is concentric remodeling. Systolic function is normal. Although no diagnostic regional wall motion abnormality was identified, this possibility cannot be completely excluded on the basis of this study. Diastolic function is normal.   Right Ventricle: Right ventricular cavity size is normal. Systolic function is normal.     XR chest 2 views    Result Date: 10/23/2023  Narrative: CHEST INDICATION:   chest pain. COMPARISON:  None EXAM PERFORMED/VIEWS:  XR CHEST PA & LATERAL 4 views FINDINGS: Cardiomediastinal silhouette appears unremarkable. The lungs are clear. No pneumothorax or pleural effusion. Osseous structures appear within normal limits for patient age. Impression: No acute cardiopulmonary disease. Workstation performed: [id number]     [address] bedside procedure    Result Date: 10/23/2023  Narrative: [id number]. [id number]    Echo follow up/limited w/ contrast if indicated    Result Date: 10/23/2023  Narrative:   Left Ventricle: Left ventricular cavity size is normal. Wall thickness is not well visualized. The left ventricular ejection fraction is 55-60% by visual estimation. Systolic function is normal. Although no diagnostic regional wall motion abnormality was identified, this possibility cannot be completely excluded on the basis of this study. Right Ventricle: Systolic function is normal. Limited overnight study.        EKG personally reviewed by Janes Lenz MD. Counseling / Coordination of Care  Total floor / unit time spent today 30 minutes. Greater than 50% of total time was spent with the patient and / or family counseling and / or coordination of care.

## 2023-10-24 NOTE — PLAN OF CARE
Problem: PAIN - ADULT  Goal: Verbalizes/displays adequate comfort level or baseline comfort level  Description: Interventions:  - Encourage patient to monitor pain and request assistance  - Assess pain using appropriate pain scale  - Administer analgesics based on type and severity of pain and evaluate response  - Implement non-pharmacological measures as appropriate and evaluate response  - Consider cultural and social influences on pain and pain management  - Notify physician/advanced practitioner if interventions unsuccessful or patient reports new pain  Outcome: Progressing     Problem: CARDIOVASCULAR - ADULT  Goal: Maintains optimal cardiac output and hemodynamic stability  Description: INTERVENTIONS:  - Monitor I/O, vital signs and rhythm  - Monitor for S/S and trends of decreased cardiac output  - Administer and titrate ordered vasoactive medications to optimize hemodynamic stability  - Assess quality of pulses, skin color and temperature  - Assess for signs of decreased coronary artery perfusion  - Instruct patient to report change in severity of symptoms  Outcome: Progressing  Goal: Absence of cardiac dysrhythmias or at baseline rhythm  Description: INTERVENTIONS:  - Continuous cardiac monitoring, vital signs, obtain 12 lead EKG if ordered  - Administer antiarrhythmic and heart rate control medications as ordered  - Monitor electrolytes and administer replacement therapy as ordered  Outcome: Progressing     Problem: INFECTION - ADULT  Goal: Absence or prevention of progression during hospitalization  Description: INTERVENTIONS:  - Assess and monitor for signs and symptoms of infection  - Monitor lab/diagnostic results  - Monitor all insertion sites, i.e. indwelling lines, tubes, and drains  - Monitor endotracheal if appropriate and nasal secretions for changes in amount and color  - [place] appropriate cooling/warming therapies per order  - Administer medications as ordered  - Instruct and encourage patient and family to use good hand hygiene technique  - Identify and instruct in appropriate isolation precautions for identified infection/condition  Outcome: Progressing  Goal: Absence of fever/infection during neutropenic period  Description: INTERVENTIONS:  - Monitor WBC    Outcome: Progressing     Problem: DISCHARGE PLANNING  Goal: Discharge to home or other facility with appropriate resources  Description: INTERVENTIONS:  - Identify barriers to discharge w/patient and caregiver  - Arrange for needed discharge resources and transportation as appropriate  - Identify discharge learning needs (meds, wound care, etc.)  - Arrange for interpretive services to assist at discharge as needed  - Refer to Case Management Department for coordinating discharge planning if the patient needs post-hospital services based on physician/advanced practitioner order or complex needs related to functional status, cognitive ability, or social support system  Outcome: Progressing     Problem: Knowledge Deficit  Goal: Patient/family/caregiver demonstrates understanding of disease process, treatment plan, medications, and discharge instructions  Description: Complete learning assessment and assess knowledge base.   Interventions:  - Provide teaching at level of understanding  - Provide teaching via preferred learning methods  Outcome: Progressing

## 2023-10-24 NOTE — ASSESSMENT & PLAN NOTE
Patient with chest pain  Work-up including cardiac MRI revealed myopericarditis  Now on aspirin 9 7 5 mg 3 times daily for 2 weeks colchicine  Cardiology inputs noted  Cardiology plans for close outpatient follow-up noted  Outpatient cardiology follow-up

## 2023-10-24 NOTE — DISCHARGE SUMMARY
[address]  Discharge summary  Name: Marcella Will  MRN: [id number]  Unit/Bed#: [facility] 525-01 I Date of Admission: 10/22/2023   Date of Service: 10/24/2023 I Hospital Day: 2        Assessment/Plan   * Chest pain  Assessment & Plan  Patient with chest pain  Work-up including cardiac MRI revealed myopericarditis  Now on aspirin 9 7 5 mg 3 times daily for 2 weeks colchicine  Cardiology inputs noted  Cardiology plans for close outpatient follow-up noted  Outpatient cardiology follow-up     Acute gout of left wrist  Assessment & Plan  Presently on colchicine  Outpatient follow-up                          Discharge Summary - [facility] Internal Medicine     Patient Information: Sabra Farfan 34 y.o. male MRN: [id number]  Unit/Bed#: [facility] 525-01 Encounter: [id number]     Discharging Physician / Practitioner: Caleb Hansen MD  PCP: Hari Og PA-C  Admission Date: 10/22/2023  Discharge Date: 10/24/23     Disposition:      Home     Reason for Admission: Chest pain     Discharge Diagnoses:      Principal Problem:    Chest pain  Active Problems:    Acute gout of left wrist    Elevated troponin  Resolved Problems:    * No resolved hospital problems. *        Consultations During Hospital Stay:  Cardiology     Procedures Performed:      Cardiac MRI -myopericarditis  2D echo LV systolic function normal  Chest x-ray no active lung disease        Hospital Course:      Sabra Farfan is a 34 y.o. male patient who originally presented to the hospital on 10/22/2023 due to chest pain. He was seen in consultation with cardiology, work-up including cardiac MRI revealed features of myopericarditis. He is on aspirin 975 mg 3 times daily, colchicine. He reports symptomatic improvement he is ambulating the room and hallways. Cardiology plans for close outpatient follow-up noted. He symptomatically improved hemodynamically stable and is deemed ready for discharge today. Kindly review the chart for details. Condition at Discharge: fair      Discharge Day Visit / Exam:      Subjective:       Comfortably sitting up in bed  Reports feeling better  Agreeable to discharge plan  History chart labs medications reviewed        Vitals: Blood Pressure: 122/80 (10/24/23 1101)  Pulse: 80 (10/24/23 1101)  Temperature: 98.5 °F (36.9 °C) (10/24/23 1101)  Temp Source: Oral (10/24/23 1101)  Respirations: 18 (10/24/23 1101)  Height: 6' 3" (190.5 cm) (10/23/23 1200)  Weight - Scale: (!) 137 kg (301 lb) (10/23/23 1200)  SpO2: 96 % (10/24/23 1101)  Exam:   Physical Exam     Comfortably sitting up in bed  Obese  Short thick neck  Lungs diminished breath sounds bilateral  Vesicular breath sounds  Heart sounds S1 and S2 noted  Abdomen soft nontender  Abdominal obesity noted  Awake obey simple commands  No pedal Ouma  No rash     Discharge instructions/Information to patient and family:   See after visit summary for information provided to patient and family. Discharge plan discussed with the patient, family at bedside updated  Outpatient follow-up with cardiology, primary care physician     Provisions for Follow-Up Care:  See after visit summary for information related to follow-up care and any pertinent home health orders. Planned Readmission: no     Discharge Statement:  I spent 43 minutes discharging the patient. This time was spent on the day of discharge. I had direct contact with the patient on the day of discharge. Greater than 50% of the total time was spent examining patient, answering all patient questions, arranging and discussing plan of care with patient as well as directly providing post-discharge instructions. Additional time then spent on discharge activities. Discharge Medications:  See after visit summary for reconciled discharge medications provided to patient and family.        ** Please Note: This note has been constructed using a voice recognition system **

## 2023-10-24 NOTE — UTILIZATION REVIEW
NOTIFICATION OF INPATIENT ADMISSION      AUTHORIZATION REQUEST   SERVICING FACILITY:   [address] [place] [address]  Address: [address] [place] [address] [address] [address]  Tax ID: [id number]  NPI: [id number] ATTENDING PROVIDER:  Attending Name and NPI#: Geroge Dandy, Md [[id number]]  Address: [address] [place] [address] [address] [address]  Phone: [phone]   ADMISSION INFORMATION:  Place of Service: Inpatient [address] [facility]   Place of Service Code: 21  Inpatient Admission Date/Time: 10/22/23  9:26 PM  Discharge Date/Time: No discharge date for patient encounter. Admitting Diagnosis Code/Description:  Pericarditis [I31.9]  Chest pain [R07.9]  Elevated troponin [R79.89]     UTILIZATION REVIEW CONTACT:  Amilcar Hahn Utilization   Network Utilization Review Department  Phone: [phone]  Fax: [phone]  Email: [email or web address]  Contact for approvals/pending authorizations, clinical reviews, and discharge. PHYSICIAN ADVISORY SERVICES:  Medical Necessity Denial & Zpen-zf-Hefx Review  Phone: [phone]  Fax: [phone]  Email: [email or web address]     DISCHARGE SUPPORT TEAM:  For Patients Discharge Needs & Updates  Phone: [phone] opt. 2 Fax: [phone]  Email: [email or web address]

## 2023-10-25 ENCOUNTER — TELEPHONE (OUTPATIENT)
Dept: FAMILY MEDICINE CLINIC | Facility: CLINIC | Age: 38
End: 2023-10-25

## 2023-10-25 NOTE — UTILIZATION REVIEW
NOTIFICATION OF ADMISSION DISCHARGE   This is a Notification of Discharge from [facility]. Please be advised that this patient has been discharge from our facility. Below you will find the admission and discharge date and time including the patient’s disposition. UTILIZATION REVIEW CONTACT:  Xiomy De Los Santos  Utilization   Network Utilization Review Department  Phone: [phone] x carefully listen to the prompts. All voicemails are confidential.  Email: [email or web address]     ADMISSION INFORMATION  PRESENTATION DATE: 10/22/2023  6:55 PM  OBERVATION ADMISSION DATE:  INPATIENT ADMISSION DATE: 10/22/23  9:26 PM   DISCHARGE DATE: 10/24/2023  2:01 PM   DISPOSITION:Home/Self Care    Network Utilization Review Department  ATTENTION: Please call with any questions or concerns to [phone] and carefully listen to the prompts so that you are directed to the right person. All voicemails are confidential.   For Discharge needs, contact Care Management DC Support Team at [phone] opt. [address]  Send all requests for admission clinical reviews, approved or denied determinations and any other requests to dedicated fax number below belonging to the campus where the patient is receiving treatment.  List of dedicated fax numbers for the Facilities:  [place] DENIALS (Administrative/Medical Necessity) [phone]   DISCHARGE SUPPORT TEAM (Network) [phone] [address] [facility] (Maternity/NICU/Pediatrics) [phone] [address] [facility] [address] [phone] [address] [facility] [phone]   [address] [place] [address]  [address]  [phone]

## 2023-10-25 NOTE — TELEPHONE ENCOUNTER
----- Message from Mati Buckley MD sent at 10/24/2023  6:33 PM EDT -----  Thank you for allowing us to participate in the care of your patient, Bentley Gallegos, who was hospitalized from 10/22/2023 through 10/24/2023 with the admitting diagnosis of myopericarditis. Discharged on aspirin 975 milligrams 3 times daily and colchicine with plans for close cardiology follow-up      If you have any additional questions or would like to discuss further, please feel free to contact me.     Mati Buckley MD  [facility] Internal Medicine, Hospitalist  [phone]

## 2023-10-26 ENCOUNTER — TRANSITIONAL CARE MANAGEMENT (OUTPATIENT)
Dept: FAMILY MEDICINE CLINIC | Facility: CLINIC | Age: 38
End: 2023-10-26

## 2023-10-26 ENCOUNTER — OFFICE VISIT (OUTPATIENT)
Dept: FAMILY MEDICINE CLINIC | Facility: CLINIC | Age: 38
End: 2023-10-26
Payer: COMMERCIAL

## 2023-10-26 VITALS
DIASTOLIC BLOOD PRESSURE: 80 MMHG | HEART RATE: 80 BPM | SYSTOLIC BLOOD PRESSURE: 102 MMHG | BODY MASS INDEX: 37.42 KG/M2 | HEIGHT: 75 IN | WEIGHT: 301 LBS | OXYGEN SATURATION: 98 % | TEMPERATURE: 98.3 F

## 2023-10-26 DIAGNOSIS — I30.9 ACUTE MYOPERICARDITIS: Primary | ICD-10-CM

## 2023-10-26 DIAGNOSIS — M1A.09X0 IDIOPATHIC CHRONIC GOUT OF MULTIPLE SITES WITHOUT TOPHUS: ICD-10-CM

## 2023-10-26 DIAGNOSIS — K21.9 GASTROESOPHAGEAL REFLUX DISEASE WITHOUT ESOPHAGITIS: ICD-10-CM

## 2023-10-26 PROCEDURE — 99496 TRANSJ CARE MGMT HIGH F2F 7D: CPT | Performed by: PHYSICIAN ASSISTANT

## 2023-10-26 NOTE — PROGRESS NOTES
Name: Joanna Owens      : 1985      MRN: [id number]  Encounter Provider: Marieal Zazueta PA-C  Encounter Date: 10/26/2023   Encounter department: 1 [facility]    Assessment & Plan     Patient Instructions   Assessment/plan:  1. Acute myopericarditis-possibly viral etiology versus gout related. Patient does have follow-up with cardiology scheduled on . Ejection fraction was between 47% and 52% as seen on echocardiogram and cardiac MRI. He did have CRP of 172 with sed rate of 119. Will order follow-up labs to be completed prior to appointment with cardiology. Patient is currently out of work. He typically works as a . He is avoiding strenuous activities. He has not had any swelling of the extremities. 2.  Gout-presently stable on colchicine twice daily. Patient does have appointment for further evaluation with rheumatology in 2024.  3.  GERD-patient is on Protonix 40 mg daily for prevention of peptic ulcer disease while he is on high-dose aspirin and colchicine therapy. 1. Acute myopericarditis  -     C-reactive protein; Future; Expected date: 2023  -     CBC and differential; Future; Expected date: 2023  -     Comprehensive metabolic panel; Future; Expected date: 2023  -     Sedimentation rate, automated; Future; Expected date: 2023  -     B-Type Natriuretic Peptide(BNP); Future; Expected date: 2023  -     High Sensitivity Troponin I Random; Future; Expected date: 2023    2. Idiopathic chronic gout of multiple sites without tophus  -     Uric acid; Future; Expected date: 2023    3. Gastroesophageal reflux disease without esophagitis        Depression Screening and Follow-up Plan: Patient was screened for depression during today's encounter. They screened negative with a PHQ-2 score of 0.         Subjective      HPI: This is a 78-year-old gentleman that presents to the office for transition of care management appointment after being hospitalized with acute myopericarditis. He started with chest pain on a Saturday evening which radiated to his jaw. After symptoms persisted through much of Sunday he went to the emergency room. His troponins were significantly elevated as well as CRP and he eventually underwent echocardiogram and cardiac MRI showing cardiac muscle inflammation. He also had several EKGs showing multiple ST changes. Etiology is unclear. Patient was tested for flu, COVID, RSV which were negative. Possibly viral as he did have a slightly elevated white count however he also has history of gout with multiple recurrent flareups. Patient continues to feel little bit sluggish and fatigued but in general his joint aches and pains are much better since he has been discharged on 975 mg of aspirin daily and colchicine twice daily. He is scheduled for follow-up with cardiologist on 6 November. He is stable without any persistent pain and his stomach seems to be tolerating medication therapy as he is currently on Protonix 40 mg daily. He has not had any swelling of the extremities. Review of Systems   Constitutional:  Negative for chills, fatigue and fever. HENT:  Negative for congestion, ear pain and sinus pressure. Eyes:  Negative for visual disturbance. Respiratory:  Negative for cough, chest tightness and shortness of breath. Cardiovascular:  Negative for chest pain and palpitations. Gastrointestinal:  Negative for diarrhea, nausea and vomiting. Endocrine: Negative for polyuria. Genitourinary:  Negative for dysuria and frequency. Musculoskeletal:  Negative for arthralgias and myalgias. Skin:  Negative for pallor and rash. Neurological:  Negative for dizziness, weakness, light-headedness, numbness and headaches. Psychiatric/Behavioral:  Negative for agitation, behavioral problems and sleep disturbance. All other systems reviewed and are negative.       Current Outpatient Medications on File Prior to Visit   Medication Sig   • aspirin 325 mg tablet Take 3 tablets (975 mg total) by mouth 3 (three) times a day for 14 days   • colchicine (COLCRYS) 0.6 mg tablet Take 1 tablet (0.6 mg total) by mouth 2 (two) times a day   • pantoprazole (PROTONIX) 40 mg tablet Take 1 tablet (40 mg total) by mouth daily in the early morning Do not start before October 25, 2023. Objective     /80 (BP Location: Left arm, Patient Position: Sitting, Cuff Size: Adult)   Pulse 80   Temp 98.3 °F (36.8 °C) (Tympanic)   Ht 6' 3" (1.905 m)   Wt (!) 137 kg (301 lb)   SpO2 98%   BMI 37.62 kg/m²   TCM Call     Date and time call was made  10/26/2023  1:34 PM    Disposition  Home      TCM Call     None         Physical Exam  Vitals and nursing note reviewed. Constitutional:       General: He is not in acute distress. Appearance: He is well-developed. HENT:      Head: Normocephalic and atraumatic. Right Ear: External ear normal.      Left Ear: External ear normal.      Nose: Nose normal.      Mouth/Throat:      Pharynx: No oropharyngeal exudate. Eyes:      Conjunctiva/sclera: Conjunctivae normal.      Pupils: Pupils are equal, round, and reactive to light. Neck:      Thyroid: No thyromegaly. Trachea: No tracheal deviation. Cardiovascular:      Rate and Rhythm: Normal rate and regular rhythm. Heart sounds: Normal heart sounds. No murmur heard. No friction rub. Pulmonary:      Effort: Pulmonary effort is normal. No respiratory distress. Breath sounds: Normal breath sounds. No wheezing or rales. Abdominal:      General: Bowel sounds are normal. There is no distension. Palpations: Abdomen is soft. Tenderness: There is no abdominal tenderness. There is no guarding or rebound. Musculoskeletal:         General: No tenderness. Normal range of motion. Cervical back: Normal range of motion and neck supple.    Lymphadenopathy:      Cervical: No cervical adenopathy. Skin:     General: Skin is warm and dry. Findings: No erythema or rash. Neurological:      Mental Status: He is alert and oriented to person, place, and time. Cranial Nerves: No cranial nerve deficit. Coordination: Coordination normal.   Psychiatric:         Behavior: Behavior normal.         Thought Content:  Thought content normal.       Pee Hurley PA-C

## 2023-10-26 NOTE — PATIENT INSTRUCTIONS
Assessment/plan:  1. Acute myopericarditis-possibly viral etiology versus gout related. Patient does have follow-up with cardiology scheduled on 6 November. Ejection fraction was between 47% and 52% as seen on echocardiogram and cardiac MRI. He did have CRP of 172 with sed rate of 119. Will order follow-up labs to be completed prior to appointment with cardiology. Patient is currently out of work. He typically works as a . He is avoiding strenuous activities. He has not had any swelling of the extremities. 2.  Gout-presently stable on colchicine twice daily. Patient does have appointment for further evaluation with rheumatology in March 2024.  3.  GERD-patient is on Protonix 40 mg daily for prevention of peptic ulcer disease while he is on high-dose aspirin and colchicine therapy.

## 2023-10-27 NOTE — ED ATTENDING ATTESTATION
10/22/2023  IDagoberto MD, saw and evaluated the patient. I have discussed the patient with the resident/non-physician practitioner and agree with the resident's/non-physician practitioner's findings, Plan of Care, and MDM as documented in the resident's/non-physician practitioner's note, except where noted. All available labs and Radiology studies were reviewed. I was present for key portions of any procedure(s) performed by the resident/non-physician practitioner and I was immediately available to provide assistance. At this point I agree with the current assessment done in the Emergency Department. I have conducted an independent evaluation of this patient a history and physical is as follows:    ED Course     Impression: Chest pain history of recent viral illness  Differential diagnosis: ACS, MI, arrhythmia, pericarditis, myocarditis, pulmonary embolism    Plan to check ECG CT chest x-ray, CBC, CMP, troponins, will treat with ketorolac, Tylenol, reassess    ECG independently interpreted by me: Diffuse elevated STs with OK depressions concerning for possible pericarditis. Chest x-ray independently interpreted by me no acute cardiopulmonary disease. Labs reviewed: CBC remarkable for elevated white cell count. CMP remarkable for elevated creatinine. Initial troponin 13,522 with a 2-hour of 18,045 concerning for myocardial injury in the context of symptoms possibly myocarditis or myopericarditis. Case discussed with cardiology and medicine will admit to medicine service for further evaluation management cardiology to see patient    Plan of care discussed with patient at bedside.     Critical Care Time  Procedures

## 2023-11-01 ENCOUNTER — APPOINTMENT (OUTPATIENT)
Dept: LAB | Facility: IMAGING CENTER | Age: 38
End: 2023-11-01
Payer: COMMERCIAL

## 2023-11-01 DIAGNOSIS — M1A.09X0 IDIOPATHIC CHRONIC GOUT OF MULTIPLE SITES WITHOUT TOPHUS: ICD-10-CM

## 2023-11-01 DIAGNOSIS — I30.9 ACUTE MYOPERICARDITIS: ICD-10-CM

## 2023-11-01 LAB
ALBUMIN SERPL BCP-MCNC: 3.8 G/DL (ref 3.5–5)
ALP SERPL-CCNC: 103 U/L (ref 34–104)
ALT SERPL W P-5'-P-CCNC: 34 U/L (ref 7–52)
ANION GAP SERPL CALCULATED.3IONS-SCNC: 10 MMOL/L
AST SERPL W P-5'-P-CCNC: 19 U/L (ref 13–39)
BASOPHILS # BLD AUTO: 0.05 THOUSANDS/ÂΜL (ref 0–0.1)
BASOPHILS NFR BLD AUTO: 1 % (ref 0–1)
BILIRUB SERPL-MCNC: 0.19 MG/DL (ref 0.2–1)
BNP SERPL-MCNC: 86 PG/ML (ref 0–100)
BUN SERPL-MCNC: 14 MG/DL (ref 5–25)
CALCIUM SERPL-MCNC: 9.4 MG/DL (ref 8.4–10.2)
CHLORIDE SERPL-SCNC: 105 MMOL/L (ref 96–108)
CO2 SERPL-SCNC: 26 MMOL/L (ref 21–32)
CREAT SERPL-MCNC: 1.26 MG/DL (ref 0.6–1.3)
CRP SERPL QL: 8.4 MG/L
EOSINOPHIL # BLD AUTO: 0.26 THOUSAND/ÂΜL (ref 0–0.61)
EOSINOPHIL NFR BLD AUTO: 4 % (ref 0–6)
ERYTHROCYTE [DISTWIDTH] IN BLOOD BY AUTOMATED COUNT: 15.3 % (ref 11.6–15.1)
ERYTHROCYTE [SEDIMENTATION RATE] IN BLOOD: 58 MM/HOUR (ref 0–14)
GFR SERPL CREATININE-BSD FRML MDRD: 70 ML/MIN/1.73SQ M
GLUCOSE P FAST SERPL-MCNC: 96 MG/DL (ref 65–99)
HCT VFR BLD AUTO: 45.5 % (ref 36.5–49.3)
HGB BLD-MCNC: 13.7 G/DL (ref 12–17)
IMM GRANULOCYTES # BLD AUTO: 0.05 THOUSAND/UL (ref 0–0.2)
IMM GRANULOCYTES NFR BLD AUTO: 1 % (ref 0–2)
LYMPHOCYTES # BLD AUTO: 2.03 THOUSANDS/ÂΜL (ref 0.6–4.47)
LYMPHOCYTES NFR BLD AUTO: 33 % (ref 14–44)
MCH RBC QN AUTO: 24.6 PG (ref 26.8–34.3)
MCHC RBC AUTO-ENTMCNC: 30.1 G/DL (ref 31.4–37.4)
MCV RBC AUTO: 82 FL (ref 82–98)
MONOCYTES # BLD AUTO: 0.35 THOUSAND/ÂΜL (ref 0.17–1.22)
MONOCYTES NFR BLD AUTO: 6 % (ref 4–12)
NEUTROPHILS # BLD AUTO: 3.41 THOUSANDS/ÂΜL (ref 1.85–7.62)
NEUTS SEG NFR BLD AUTO: 55 % (ref 43–75)
NRBC BLD AUTO-RTO: 0 /100 WBCS
PLATELET # BLD AUTO: 408 THOUSANDS/UL (ref 149–390)
PMV BLD AUTO: 9.3 FL (ref 8.9–12.7)
POTASSIUM SERPL-SCNC: 4.3 MMOL/L (ref 3.5–5.3)
PROT SERPL-MCNC: 7.5 G/DL (ref 6.4–8.4)
RBC # BLD AUTO: 5.56 MILLION/UL (ref 3.88–5.62)
SODIUM SERPL-SCNC: 141 MMOL/L (ref 135–147)
URATE SERPL-MCNC: 13.4 MG/DL (ref 3.5–8.5)
WBC # BLD AUTO: 6.15 THOUSAND/UL (ref 4.31–10.16)

## 2023-11-01 PROCEDURE — 86140 C-REACTIVE PROTEIN: CPT

## 2023-11-01 PROCEDURE — 36415 COLL VENOUS BLD VENIPUNCTURE: CPT

## 2023-11-01 PROCEDURE — 83880 ASSAY OF NATRIURETIC PEPTIDE: CPT

## 2023-11-01 PROCEDURE — 85025 COMPLETE CBC W/AUTO DIFF WBC: CPT

## 2023-11-01 PROCEDURE — 80053 COMPREHEN METABOLIC PANEL: CPT

## 2023-11-01 PROCEDURE — 84550 ASSAY OF BLOOD/URIC ACID: CPT

## 2023-11-01 PROCEDURE — 85652 RBC SED RATE AUTOMATED: CPT

## 2023-11-02 NOTE — PROGRESS NOTES
Cardiology Follow Up    Veronica Mello  1985  [id number]  [facility] [address] [id number] [phone]    1. Myopericarditis            Interval History:   Mr Veronica Mello was admitted to [address] [place] [address] on 10/22 - 10/24/22 with chest pain. Troponins elevated >15.000, .6, and ESR elevated 119. Cardiac MRI showed left ventricle mildly dilated normal wall thickness LVEF 52% no regional left ventricular wall motion abnormality. Right ventricular is normal in size and normal in ventricular function. No significant valvular disease, left atrium normal in size aortic root normal in size. Delayed postgadolinium imaging demonstrated diffuse subcu pericardial hyperenhancement of the entire inferior lateral walls suggestive of  Myopericarditis. He also experienced acute gout of the left wrist.  He was started on aspirin 975 mg 3 times daily for 2 weeks and colchicine 0.6 mg twice daily for 2 months and then 0.6 mg daily. He was instructed not to return back to work and avoid overexertion. Gretel Marion was discharged home. Shi Rodriguez presents to our office for a recent hospitalization follow up visit. He is accompanied by his son and wife. He denies CP, palpitations lightheadedness or dizziness. He is taking all his medications as prescribed. He is taking aspirin with food and drinking plenty of water.   He denies diarrhea      Medical History   Primary Cardiologist  Gout  Obesity BMI 38.98   Patient Active Problem List   Diagnosis    Elevated blood pressure reading    Strain of back, initial encounter    Sciatic leg pain    Acute midline low back pain with left-sided sciatica    Lumbar back pain with radiculopathy affecting lower extremity    Spinal stenosis of lumbar region    DDD (degenerative disc disease), lumbar    Lumbar spondylosis    Lumbar radiculopathy    Lumbosacral radiculopathy at L3    Acute idiopathic gout of left elbow    Acute gout of left wrist    COVID-19    Hyperglycemia    Mixed hyperlipidemia    Encounter for physical examination    Chest pain    Elevated troponin     Past Medical History:   Diagnosis Date    Arthritis      Social History     Socioeconomic History    Marital status: /Civil Union     Spouse name: Not on file    Number of children: Not on file    Years of education: Not on file    Highest education level: Not on file   Occupational History    Not on file   Tobacco Use    Smoking status: Never    Smokeless tobacco: Never   Substance and Sexual Activity    Alcohol use: Never    Drug use: Never    Sexual activity: Yes   Other Topics Concern    Not on file   Social History Narrative         Student      Social Determinants of Health     Financial Resource Strain: Not on file   Food Insecurity: Not on file   Transportation Needs: Not on file   Physical Activity: Not on file   Stress: Not on file   Social Connections: Not on file   Intimate Partner Violence: Not on file   Housing Stability: Not on file      Family History   Problem Relation Age of Onset    Hypertension Mother     No Known Problems Father      No past surgical history on file.     Current Outpatient Medications:     aspirin 325 mg tablet, Take 3 tablets (975 mg total) by mouth 3 (three) times a day for 14 days, Disp: 126 tablet, Rfl: 0    colchicine (COLCRYS) 0.6 mg tablet, Take 1 tablet (0.6 mg total) by mouth 2 (two) times a day, Disp: 60 tablet, Rfl: 0    pantoprazole (PROTONIX) 40 mg tablet, Take 1 tablet (40 mg total) by mouth daily in the early morning Do not start before October 25, 2023., Disp: 30 tablet, Rfl: 0  Allergies   Allergen Reactions    Gabapentin     Zyloprim [Allopurinol] Myalgia       Labs:  Appointment on 11/01/2023   Component Date Value    CRP 11/01/2023 8.4 (H)     WBC 11/01/2023 6.15     RBC 11/01/2023 5.56     Hemoglobin 11/01/2023 13.7     Hematocrit 11/01/2023 45.5 MCV 11/01/2023 82     MCH 11/01/2023 24.6 (L)     MCHC 11/01/2023 30.1 (L)     RDW 11/01/2023 15.3 (H)     MPV 11/01/2023 9.3     Platelets 33/24/0958 408 (H)     nRBC 11/01/2023 0     Neutrophils Relative 11/01/2023 55     Immat GRANS % 11/01/2023 1     Lymphocytes Relative 11/01/2023 33     Monocytes Relative 11/01/2023 6     Eosinophils Relative 11/01/2023 4     Basophils Relative 11/01/2023 1     Neutrophils Absolute 11/01/2023 3.41     Immature Grans Absolute 11/01/2023 0.05     Lymphocytes Absolute 11/01/2023 2.03     Monocytes Absolute 11/01/2023 0.35     Eosinophils Absolute 11/01/2023 0.26     Basophils Absolute 11/01/2023 0.05     Sodium 11/01/2023 141     Potassium 11/01/2023 4.3     Chloride 11/01/2023 105     CO2 11/01/2023 26     ANION GAP 11/01/2023 10     BUN 11/01/2023 14     Creatinine 11/01/2023 1.26     Glucose, Fasting 11/01/2023 96     Calcium 11/01/2023 9.4     AST 11/01/2023 19     ALT 11/01/2023 34     Alkaline Phosphatase 11/01/2023 103     Total Protein 11/01/2023 7.5     Albumin 11/01/2023 3.8     Total Bilirubin 11/01/2023 0.19 (L)     eGFR 11/01/2023 70     Sed Rate 11/01/2023 58 (H)     BNP 11/01/2023 86     Uric Acid 11/01/2023 13.4 (H)    Admission on 10/22/2023, Discharged on 10/24/2023   Component Date Value    Ventricular Rate 10/22/2023 75     Atrial Rate 10/22/2023 75     AK Interval 10/22/2023 158     QRSD Interval 10/22/2023 112     QT Interval 10/22/2023 370     QTC Interval 10/22/2023 413     P Axis 10/22/2023 28     QRS Axis 10/22/2023 33     T Wave [place] 10/22/2023 54     WBC 10/22/2023 12.75 (H)     RBC 10/22/2023 5.29     Hemoglobin 10/22/2023 13.0     Hematocrit 10/22/2023 41.3     MCV 10/22/2023 78 (L)     MCH 10/22/2023 24.6 (L)     MCHC 10/22/2023 31.5     RDW 10/22/2023 15.8 (H)     MPV 10/22/2023 9.3     Platelets 03/58/4055 423 (H)     nRBC 10/22/2023 0     Neutrophils Relative 10/22/2023 65     Immat GRANS % 10/22/2023 1     Lymphocytes Relative 10/22/2023 21 Monocytes Relative 10/22/2023 10     Eosinophils Relative 10/22/2023 2     Basophils Relative 10/22/2023 1     Neutrophils Absolute 10/22/2023 8.30 (H)     Immature Grans Absolute 10/22/2023 0.10     Lymphocytes Absolute 10/22/2023 2.72     Monocytes Absolute 10/22/2023 1.26 (H)     Eosinophils Absolute 10/22/2023 0.29     Basophils Absolute 10/22/2023 0.08     Sodium 10/22/2023 137     Potassium 10/22/2023 4.2     Chloride 10/22/2023 103     CO2 10/22/2023 26     ANION GAP 10/22/2023 8     BUN 10/22/2023 17     Creatinine 10/22/2023 1.39 (H)     Glucose 10/22/2023 115     Calcium 10/22/2023 8.9     AST 10/22/2023 70 (H)     ALT 10/22/2023 39     Alkaline Phosphatase 10/22/2023 111 (H)     Total Protein 10/22/2023 7.7     Albumin 10/22/2023 3.9     Total Bilirubin 10/22/2023 0.43     eGFR 10/22/2023 62     hs TnI 0hr 10/22/2023 13,522 (H)     hs TnI 2hr 10/22/2023 18,485 (H)     Delta 2hr hsTnI 10/22/2023 4,963 (H)     Ventricular Rate 10/22/2023 77     Atrial Rate 10/22/2023 77     NC Interval 10/22/2023 164     QRSD Interval 10/22/2023 112     QT Interval 10/22/2023 360     QTC Interval 10/22/2023 407     P Axis 10/22/2023 73     QRS [place] 10/22/2023 68     T Wave [place] 10/22/2023 59     hs TnI 4hr 10/23/2023 15,892 (H)     Delta 4hr hsTnI 10/23/2023 2,370 (H)     Sed Rate 10/22/2023 119 (H)     CRP 10/22/2023 172.6 (H)     VIRIDIANA 10/22/2023 Negative     Rheumatoid Factor 10/22/2023 Negative     Cyclic Citrullinated Pep* 10/22/2023 1.0     C3 Complement 10/22/2023 189     C4, COMPLEMENT 10/22/2023 69 (H)     SARS-CoV-2 10/22/2023 Negative     INFLUENZA A PCR 10/22/2023 Negative     INFLUENZA B PCR 10/22/2023 Negative     RSV PCR 10/22/2023 Negative     A4C EF 10/23/2023 47     LVIDd 10/23/2023 5.00     LVIDS 10/23/2023 3.80     IVSd 10/23/2023 1.20     LVPWd 10/23/2023 1.00     FS 10/23/2023 24     MV E' Tissue Velocity Se* 10/23/2023 15     MV E' Tissue Velocity La* 10/23/2023 14     LA Volume Index (BP) 10/23/2023 21.5     E/A ratio 10/23/2023 1.10     E wave deceleration time 10/23/2023 230     MV Peak E Adolfo 10/23/2023 69     MV Peak A Adolfo 10/23/2023 0.63     RVID d 10/23/2023 3.5     Tricuspid annular plane * 10/23/2023 2.10     LA size 10/23/2023 4.1     LA length (A2C) 10/23/2023 6.20     LA volume (BP) 10/23/2023 56     RAA A4C 10/23/2023 14.5     MV stenosis pressure 1/2* 10/23/2023 67     MV valve area p 1/2 meth* 10/23/2023 3.28     Ao root 10/23/2023 3.30     Asc Ao 10/23/2023 3.6     Left ventricular stroke * 10/23/2023 58.00     IVS 10/23/2023 1.2     LEFT VENTRICLE SYSTOLIC * 61/61/9590 61     LV DIASTOLIC VOLUME (MOD* 86/22/9701 119     Left Atrium Area-systoli* 10/23/2023 15.6     Left Atrium Area-systoli* 10/23/2023 22.5     LVSV, 2D 10/23/2023 58     Sodium 10/23/2023 138     Potassium 10/23/2023 3.5     Chloride 10/23/2023 102     CO2 10/23/2023 24     ANION GAP 10/23/2023 12     BUN 10/23/2023 16     Creatinine 10/23/2023 1.24     Glucose 10/23/2023 87     Calcium 10/23/2023 9.2     eGFR 10/23/2023 72     WBC 10/23/2023 10.82 (H)     RBC 10/23/2023 5.40     Hemoglobin 10/23/2023 13.3     Hematocrit 10/23/2023 44.5     MCV 10/23/2023 82     MCH 10/23/2023 24.6 (L)     MCHC 10/23/2023 29.9 (L)     RDW 10/23/2023 15.9 (H)     Platelets 24/09/3994 446 (H)     MPV 10/23/2023 9.2     Ventricular Rate 10/23/2023 64     Atrial Rate 10/23/2023 64     RI Interval 10/23/2023 170     QRSD Interval 10/23/2023 112     QT Interval 10/23/2023 404     QTC Interval 10/23/2023 416     P Axis 10/23/2023 13     QRS Axis 10/23/2023 -2     T Wave [place] 10/23/2023 23     LV EF 10/22/2023 55     Ventricular Rate 10/23/2023 76     Atrial Rate 10/23/2023 76     RI Interval 10/23/2023 170     QRSD Interval 10/23/2023 112     QT Interval 10/23/2023 378     QTC Interval 10/23/2023 425     P Axis 10/23/2023 35     QRS [place] 10/23/2023 -1     T Wave [place] 10/23/2023 10     Sodium 10/24/2023 139     Potassium 10/24/2023 4.8     Chloride 10/24/2023 106     CO2 10/24/2023 27     ANION GAP 10/24/2023 6     BUN 10/24/2023 16     Creatinine 10/24/2023 1.15     Glucose 10/24/2023 97     Calcium 10/24/2023 9.0     eGFR 10/24/2023 79     WBC 10/24/2023 10.46 (H)     RBC 10/24/2023 5.16     Hemoglobin 10/24/2023 12.7     Hematocrit 10/24/2023 41.2     MCV 10/24/2023 80 (L)     MCH 10/24/2023 24.6 (L)     MCHC 10/24/2023 30.8 (L)     RDW 10/24/2023 15.9 (H)     MPV 10/24/2023 9.0     Platelets 51/55/3812 429 (H)     nRBC 10/24/2023 0     Neutrophils Relative 10/24/2023 59     Immat GRANS % 10/24/2023 2     Lymphocytes Relative 10/24/2023 24     Monocytes Relative 10/24/2023 10     Eosinophils Relative 10/24/2023 4     Basophils Relative 10/24/2023 1     Neutrophils Absolute 10/24/2023 6.25     Immature Grans Absolute 10/24/2023 0.17     Lymphocytes Absolute 10/24/2023 2.54     Monocytes Absolute 10/24/2023 1.02     Eosinophils Absolute 10/24/2023 0.38     Basophils Absolute 10/24/2023 0.10     Ventricular Rate 10/22/2023 75     Atrial Rate 10/22/2023 75     CO Interval 10/22/2023 174     QRSD Interval 10/22/2023 112     QT Interval 10/22/2023 362     QTC Interval 10/22/2023 404     P Axis 10/22/2023 70     QRS [place] 10/22/2023 63     T Wave [place] 10/22/2023 57      Imaging: MRI cardiac  w wo contrast    Result Date: 10/24/2023  Narrative: 34year old man for evaluation for myopericarditis. Technique: 1. 3 plane SSFP localizers. 2. SSFP cine imaging in long and short axis plane. 3. T2 weighted DIR FSE in short axis plane. 4. Gadobutrol power injected. 5. 2D inversion recovery FGRE for delayed myocardial enhancement. 6. The patient tolerated the procedure well without complication.  Measurements: Nate-septal wall 9 mm Postero-lateral wall 10 mm Left ventricular end-diastolic dimension 60 mm Left ventricular end-systolic dimension 44 mm Left ventricular end-diastolic volume 604 ml Left ventricular end-systolic volume  79 ml Stroke volume 86 ml Cardiac Output 5.5 L/min Ejection fraction 52 % Left atrium 35 mm Aortic Root 33 mm Findings: 1. The left ventricle is mildly dilated with normal wall thickness. Left ventricular systolic function is normal with a measured ejection fraction of 52%. There are no regional left ventricular wall motion abnormalities. 2. The right ventricle is normal in size with normal right ventricular systolic function. 3. The aortic, mitral, and tricuspid valves open without restriction. There is no significant valvular regurgitation, however cine MRI is inaccurate in the qualitative assessment of valvular regurgitation. 4. The left atrium is normal in size. The aortic root is normal in size. 5. Delayed post-gadolinium imaging demonstrates diffuse subepicardial hyperenhancement of the entire inferior and lateral walls. Impression: Impression: 1. Mildly dilated left ventricle with normal left ventricular systolic function. 2. Normal right ventricular size and systolic function. 3. No significant valvular abnormalities. 4. Diffuse subepicardial edema/inflammation of the entire inferior and lateral walls. These findings are highly suggestive of myopericarditis. Workstation performed: [id number]     Echo complete w/ contrast if indicated    Result Date: 10/23/2023  Narrative:   Left Ventricle: Left ventricular cavity size is normal. Wall thickness is mildly increased. There is concentric remodeling. Systolic function is normal. Although no diagnostic regional wall motion abnormality was identified, this possibility cannot be completely excluded on the basis of this study. Diastolic function is normal.   Right Ventricle: Right ventricular cavity size is normal. Systolic function is normal.     XR chest 2 views    Result Date: 10/23/2023  Narrative: CHEST INDICATION:   chest pain. COMPARISON:  None EXAM PERFORMED/VIEWS:  XR CHEST PA & LATERAL 4 views FINDINGS: Cardiomediastinal silhouette appears unremarkable. The lungs are clear.   No pneumothorax or pleural effusion. Osseous structures appear within normal limits for patient age. Impression: No acute cardiopulmonary disease. Workstation performed: [id number]     [address] bedside procedure    Result Date: 10/23/2023  Narrative: [id number]. [id number]    Echo follow up/limited w/ contrast if indicated    Result Date: 10/23/2023  Narrative:   Left Ventricle: Left ventricular cavity size is normal. Wall thickness is not well visualized. The left ventricular ejection fraction is 55-60% by visual estimation. Systolic function is normal. Although no diagnostic regional wall motion abnormality was identified, this possibility cannot be completely excluded on the basis of this study. Right Ventricle: Systolic function is normal. Limited overnight study. Review of Systems:  Review of Systems   All other systems reviewed and are negative. Physical Exam:  Physical Exam  Vitals reviewed. Constitutional:       Appearance: He is obese. Cardiovascular:      Rate and Rhythm: Normal rate and regular rhythm. Pulses: Normal pulses. Heart sounds: Normal heart sounds. Pulmonary:      Effort: Pulmonary effort is normal.      Breath sounds: Normal breath sounds. Musculoskeletal:         General: Normal range of motion. Cervical back: Normal range of motion and neck supple. Right lower leg: No edema. Left lower leg: No edema. Skin:     General: Skin is warm and dry. Capillary Refill: Capillary refill takes less than 2 seconds. Neurological:      General: No focal deficit present. Mental Status: He is alert and oriented to person, place, and time. Psychiatric:         Mood and Affect: Mood normal.         Behavior: Behavior normal.         Discussion/Summary:  # Myopericarditis continue on aspirin 975 mg 3 times daily for total 2 weeks. Continue to take aspirin with food and hydrate.   Continue on colchicine 0.6 mg twice daily for 2 months then decrease to 0.6 mg daily. Follow-up in our office thereafter. Titi Clay is cleared to return back to work. I have advised him to avoid overexertion for total of 1 month then return to yard work slowly.

## 2023-11-06 ENCOUNTER — OFFICE VISIT (OUTPATIENT)
Dept: CARDIOLOGY CLINIC | Facility: CLINIC | Age: 38
End: 2023-11-06
Payer: COMMERCIAL

## 2023-11-06 VITALS
SYSTOLIC BLOOD PRESSURE: 110 MMHG | BODY MASS INDEX: 38.78 KG/M2 | HEART RATE: 75 BPM | DIASTOLIC BLOOD PRESSURE: 70 MMHG | OXYGEN SATURATION: 98 % | HEIGHT: 75 IN | WEIGHT: 311.9 LBS

## 2023-11-06 DIAGNOSIS — I31.9 MYOPERICARDITIS: Primary | ICD-10-CM

## 2023-11-06 PROCEDURE — 99215 OFFICE O/P EST HI 40 MIN: CPT | Performed by: NURSE PRACTITIONER

## 2023-11-06 NOTE — LETTER
November 6, 2023     Patient: John Bearden  YOB: 1985  Date of Visit: 11/6/2023      To Whom it May Concern:    John Bearden is under my professional care. Anand Albert was seen in my office on 11/6/2023. Anand Albert may return to work on 11/07/23 . If you have any questions or concerns, please don't hesitate to call.          Sincerely,          Will SHAYLA Em        CC: No Recipients

## 2023-11-06 NOTE — PATIENT INSTRUCTIONS
Continue on aspirin for total of 2 months, colchicine 0.6 mg twice daily for 2 months then decrease to 0.6 mg daily

## 2023-11-27 DIAGNOSIS — I31.9 PERICARDITIS: ICD-10-CM

## 2023-11-27 RX ORDER — COLCHICINE 0.6 MG/1
0.6 TABLET ORAL 2 TIMES DAILY
Qty: 60 TABLET | Refills: 3 | Status: SHIPPED | OUTPATIENT
Start: 2023-11-27 | End: 2023-11-29 | Stop reason: SDUPTHER

## 2023-11-29 DIAGNOSIS — I31.9 PERICARDITIS: ICD-10-CM

## 2023-11-29 RX ORDER — COLCHICINE 0.6 MG/1
0.6 TABLET ORAL 2 TIMES DAILY
Qty: 60 TABLET | Refills: 3 | Status: SHIPPED | OUTPATIENT
Start: 2023-11-29

## 2023-12-27 ENCOUNTER — TELEPHONE (OUTPATIENT)
Dept: RHEUMATOLOGY | Facility: CLINIC | Age: 38
End: 2023-12-27

## 2023-12-27 NOTE — TELEPHONE ENCOUNTER
LM to reschedule pts upcoming appt w Dr. Ragsdale on 3/21. Pt will need to be rescheduled w another provider or in May due to there being limited NP spots.    Left 2nd message on 12/28 to reschedule pts upcoming appt w Dr. Ragsdale on 3/21. Pt will need to be rescheduled w another provider or in May due to there being limited NP spots.    Left 3rd message on 12/29 to reschedule pts upcoming appt w Dr. Ragsdale on 3/21. A letter was sent and the appointment was cancelled.

## 2024-01-02 NOTE — PROGRESS NOTES
Cardiology Follow Up    Kel Amin  1985  [id number]  [facility]  [address]  [place] [id number] [phone]    1. Myopericarditis        2. Chest pain, unspecified type            Interval History: Patient is here for a follow-up visit.  Patient has been seen in the hospital October 2022 with chest discomfort.  He had significant troponin elevation.  Cardiac MRI showed a mildly dilated LV with LVEF of 52%.  Delayed postgadolinium imaging demonstrated diffuse subepicardial hyperenhancement of the entire inferior lateral wall suggestive of myopericarditis.  Patient was treated with aspirin and colchicine.  He was seen by our CRNP November 2023 and cleared to return to work.  Echocardiogram done October 2022 demonstrated normal LVEF with mild LVH and no significant valve disease.  Patient has been well.  He has had no chest pain or significant dyspnea.  His vital signs are stable today.  C-reactive protein when rechecked in November 2023 had improved.    Patient Active Problem List   Diagnosis   • Elevated blood pressure reading   • Strain of back, initial encounter   • Sciatic leg pain   • Acute midline low back pain with left-sided sciatica   • Lumbar back pain with radiculopathy affecting lower extremity   • Spinal stenosis of lumbar region   • DDD (degenerative disc disease), lumbar   • Lumbar spondylosis   • Lumbar radiculopathy   • Lumbosacral radiculopathy at L3   • Acute idiopathic gout of left elbow   • Acute gout of left wrist   • COVID-19   • Hyperglycemia   • Mixed hyperlipidemia   • Encounter for physical examination   • Chest pain   • Elevated troponin     Past Medical History:   Diagnosis Date   • Arthritis      Social History     Socioeconomic History   • Marital status: /Civil Union     Spouse name: Not on file   • Number of children: Not on file   • Years of education: Not on file   •  "Highest education level: Not on file   Occupational History   • Not on file   Tobacco Use   • Smoking status: Never   • Smokeless tobacco: Never   Substance and Sexual Activity   • Alcohol use: Never   • Drug use: Never   • Sexual activity: Yes   Other Topics Concern   • Not on file   Social History Narrative         Student      Social Determinants of Health     Financial Resource Strain: Not on file   Food Insecurity: Not on file   Transportation Needs: Not on file   Physical Activity: Not on file   Stress: Not on file   Social Connections: Not on file   Intimate Partner Violence: Not on file   Housing Stability: Not on file      Family History   Problem Relation Age of Onset   • Hypertension Mother    • No Known Problems Father      No past surgical history on file.    Current Outpatient Medications:   •  colchicine (COLCRYS) 0.6 mg tablet, Take 1 tablet (0.6 mg total) by mouth 2 (two) times a day (Patient taking differently: Take 0.6 mg by mouth daily), Disp: 60 tablet, Rfl: 3  •  aspirin 325 mg tablet, Take 3 tablets (975 mg total) by mouth 3 (three) times a day for 14 days (Patient not taking: Reported on 1/12/2024), Disp: 126 tablet, Rfl: 0  •  pantoprazole (PROTONIX) 40 mg tablet, Take 1 tablet (40 mg total) by mouth daily in the early morning Do not start before October 25, 2023. (Patient not taking: Reported on 1/12/2024), Disp: 30 tablet, Rfl: 0  Allergies   Allergen Reactions   • Gabapentin    • Zyloprim [Allopurinol] Myalgia       Labs:not applicable  Imaging: No results found.    Review of Systems:  Review of Systems   All other systems reviewed and are negative.      Physical Exam:  /82 (BP Location: Left arm, Patient Position: Sitting, Cuff Size: Large)   Pulse 95   Ht 6' 3\" (1.905 m)   Wt (!) 142 kg (313 lb 11.2 oz)   BMI 39.21 kg/m²   Physical Exam  Vitals reviewed.   Constitutional:       Appearance: He is well-developed.   HENT:      Head: Normocephalic and atraumatic. "   Cardiovascular:      Rate and Rhythm: Normal rate.      Heart sounds: Normal heart sounds.   Pulmonary:      Effort: Pulmonary effort is normal.      Breath sounds: Normal breath sounds.   Musculoskeletal:      Cervical back: Normal range of motion.   Skin:     General: Skin is warm and dry.   Neurological:      Mental Status: He is alert and oriented to person, place, and time.         Discussion/Summary: Patient will continue his present medical regimen except I have asked him to stop colchicine on a daily basis.  He does use colchicine as needed for gout.  He does have a high uric acid level.  I advised that he see a rheumatologist in reference to this.  I have asked him to call if there is a problem in the interim otherwise I will see him as needed going forward.

## 2024-01-12 ENCOUNTER — OFFICE VISIT (OUTPATIENT)
Dept: CARDIOLOGY CLINIC | Facility: CLINIC | Age: 39
End: 2024-01-12
Payer: COMMERCIAL

## 2024-01-12 VITALS
WEIGHT: 313.7 LBS | DIASTOLIC BLOOD PRESSURE: 82 MMHG | HEIGHT: 75 IN | HEART RATE: 95 BPM | BODY MASS INDEX: 39 KG/M2 | SYSTOLIC BLOOD PRESSURE: 130 MMHG

## 2024-01-12 DIAGNOSIS — R07.9 CHEST PAIN, UNSPECIFIED TYPE: ICD-10-CM

## 2024-01-12 DIAGNOSIS — I31.9 MYOPERICARDITIS: Primary | ICD-10-CM

## 2024-01-12 PROCEDURE — 99214 OFFICE O/P EST MOD 30 MIN: CPT | Performed by: INTERNAL MEDICINE

## 2024-05-06 ENCOUNTER — OFFICE VISIT (OUTPATIENT)
Dept: FAMILY MEDICINE CLINIC | Facility: CLINIC | Age: 39
End: 2024-05-06
Payer: COMMERCIAL

## 2024-05-06 VITALS
BODY MASS INDEX: 39.17 KG/M2 | HEART RATE: 76 BPM | OXYGEN SATURATION: 96 % | DIASTOLIC BLOOD PRESSURE: 82 MMHG | WEIGHT: 315 LBS | SYSTOLIC BLOOD PRESSURE: 130 MMHG | HEIGHT: 75 IN

## 2024-05-06 DIAGNOSIS — E78.2 MIXED HYPERLIPIDEMIA: Primary | ICD-10-CM

## 2024-05-06 DIAGNOSIS — I31.9 PERICARDITIS: ICD-10-CM

## 2024-05-06 DIAGNOSIS — R73.9 HYPERGLYCEMIA: ICD-10-CM

## 2024-05-06 DIAGNOSIS — M10.9 GOUT, UNSPECIFIED CAUSE, UNSPECIFIED CHRONICITY, UNSPECIFIED SITE: ICD-10-CM

## 2024-05-06 DIAGNOSIS — J45.20 MILD INTERMITTENT ASTHMA WITHOUT COMPLICATION: ICD-10-CM

## 2024-05-06 PROCEDURE — 99214 OFFICE O/P EST MOD 30 MIN: CPT | Performed by: PHYSICIAN ASSISTANT

## 2024-05-06 RX ORDER — ALBUTEROL SULFATE 2.5 MG/3ML
2.5 SOLUTION RESPIRATORY (INHALATION) EVERY 6 HOURS PRN
Qty: 180 ML | Refills: 5 | Status: SHIPPED | OUTPATIENT
Start: 2024-05-06

## 2024-05-06 RX ORDER — DICLOFENAC SODIUM 75 MG/1
75 TABLET, DELAYED RELEASE ORAL 2 TIMES DAILY
Qty: 60 TABLET | Refills: 2 | Status: SHIPPED | OUTPATIENT
Start: 2024-05-06

## 2024-05-06 RX ORDER — COLCHICINE 0.6 MG/1
0.6 TABLET ORAL 2 TIMES DAILY
Qty: 60 TABLET | Refills: 3 | Status: SHIPPED | OUTPATIENT
Start: 2024-05-06

## 2024-05-06 RX ORDER — ALBUTEROL SULFATE 90 UG/1
2 AEROSOL, METERED RESPIRATORY (INHALATION) EVERY 6 HOURS PRN
Qty: 8 G | Refills: 1 | Status: SHIPPED | OUTPATIENT
Start: 2024-05-06

## 2024-05-06 NOTE — PATIENT INSTRUCTIONS
Assessment/plan:  1.  Gout-last uric acid level was above 13.  Patient has previously been intolerant of allopurinol therapy.  He continues colchicine as necessary and this seems to be working for him.  2.  Hyperlipidemia-stable.  Will reassess labs and follow-up as necessary.  3.  Hyperglycemia-patient has had some weight gain over the past 6 months.  Recommend reassessing hemoglobin A1c level.  4.  Mild intermittent asthma-stable with as needed inhaler usage.  Will provide Ventolin/albuterol HFA and albuterol solution for nebulizer if necessary.  Overall symptoms are still rather infrequent but if he is having them more than twice per week we should discuss controller medications.  5.  GERD-presently stable without any medication.  Continue healthy diet and exercise.

## 2024-05-06 NOTE — PROGRESS NOTES
Name: Kel Amin      : 1985      MRN: [id number]  Encounter Provider: Nikolai Vicente PA-C  Encounter Date: 2024   Encounter department: [facility]    Assessment & Plan     Patient Instructions   Assessment/plan:  1.  Gout-last uric acid level was above 13.  Patient has previously been intolerant of allopurinol therapy.  He continues colchicine as necessary and this seems to be working for him.  2.  Hyperlipidemia-stable.  Will reassess labs and follow-up as necessary.  3.  Hyperglycemia-patient has had some weight gain over the past 6 months.  Recommend reassessing hemoglobin A1c level.  4.  Mild intermittent asthma-stable with as needed inhaler usage.  Will provide Ventolin/albuterol HFA and albuterol solution for nebulizer if necessary.  Overall symptoms are still rather infrequent but if he is having them more than twice per week we should discuss controller medications.  5.  GERD-presently stable without any medication.  Continue healthy diet and exercise.    1. Mixed hyperlipidemia  -     Lipid Panel with Direct LDL reflex    2. Hyperglycemia  -     Hemoglobin A1C    3. Gout, unspecified cause, unspecified chronicity, unspecified site  -     Uric acid    4. Pericarditis  -     CBC and differential  -     Comprehensive metabolic panel  -     TSH, 3rd generation with Free T4 reflex  -     colchicine (COLCRYS) 0.6 mg tablet; Take 1 tablet (0.6 mg total) by mouth 2 (two) times a day    5. Mild intermittent asthma without complication  -     CBC and differential  -     Comprehensive metabolic panel  -     Hemoglobin A1C  -     Lipid Panel with Direct LDL reflex  -     TSH, 3rd generation with Free T4 reflex  -     Uric acid  -     diclofenac (VOLTAREN) 75 mg EC tablet; Take 1 tablet (75 mg total) by mouth 2 (two) times a day  -     albuterol (2.5 mg/3 mL) 0.083 % nebulizer solution; Take 3 mL (2.5 mg total) by nebulization every 6 (six) hours as needed for wheezing or  shortness of breath  -     albuterol (Ventolin HFA) 90 mcg/act inhaler; Inhale 2 puffs every 6 (six) hours as needed for wheezing         Subjective      HPI: This is a 39-year-old gentleman that presents to the office for 6-month follow-up of chronic health conditions including gout and esophageal reflux disease.  Patient has been feeling well for the most part.  He has not had any problems with esophageal reflux and has not needed any medications.  He does tend to get gout episodes about once per month.  He has been using colchicine to successfully bell off the attacks when they come.  He has not been having any significant episodes of the feet which bother him the most.  Mostly it has been the upper extremities.  He has previously tried allopurinol but had significant muscle aches with it.  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  He does not seem to have any residual symptoms and was released from cardiology in November.  He did recently return from [place] and has been having some issue with occasional asthma symptoms.  He states he has not really had asthma problems since he was in childhood.  He did use a nebulizer treatment with some relief twice.      Review of Systems   Constitutional:  Negative for chills, fatigue and fever.   HENT:  Negative for congestion, ear pain and sinus pressure.    Eyes:  Negative for visual disturbance.   Respiratory:  Negative for cough, chest tightness and shortness of breath.    Cardiovascular:  Negative for chest pain and palpitations.   Gastrointestinal:  Negative for diarrhea, nausea and vomiting.   Endocrine: Negative for polyuria.   Genitourinary:  Negative for dysuria and frequency.   Musculoskeletal:  Negative for arthralgias and myalgias.   Skin:  Negative for pallor and rash.   Neurological:  Negative for dizziness, weakness, light-headedness, numbness and headaches.   Psychiatric/Behavioral:  Negative for agitation,  "behavioral problems and sleep disturbance.    All other systems reviewed and are negative.      Current Outpatient Medications on File Prior to Visit   Medication Sig   • [DISCONTINUED] colchicine (COLCRYS) 0.6 mg tablet Take 1 tablet (0.6 mg total) by mouth 2 (two) times a day (Patient taking differently: Take 0.6 mg by mouth daily)   • [DISCONTINUED] aspirin 325 mg tablet Take 3 tablets (975 mg total) by mouth 3 (three) times a day for 14 days (Patient not taking: Reported on 1/12/2024)   • [DISCONTINUED] pantoprazole (PROTONIX) 40 mg tablet Take 1 tablet (40 mg total) by mouth daily in the early morning Do not start before October 25, 2023. (Patient not taking: Reported on 1/12/2024)       Objective     /82 (BP Location: Left arm, Patient Position: Sitting, Cuff Size: Large)   Pulse 76   Ht 6' 3\" (1.905 m)   Wt (!) 144 kg (318 lb)   SpO2 96%   BMI 39.75 kg/m²     Physical Exam  Vitals and nursing note reviewed.   Constitutional:       General: He is not in acute distress.     Appearance: He is well-developed.   HENT:      Head: Normocephalic and atraumatic.      Right Ear: External ear normal.      Left Ear: External ear normal.      Nose: Nose normal.      Mouth/Throat:      Pharynx: No oropharyngeal exudate.   Eyes:      Conjunctiva/sclera: Conjunctivae normal.      Pupils: Pupils are equal, round, and reactive to light.   Neck:      Thyroid: No thyromegaly.      Trachea: No tracheal deviation.   Cardiovascular:      Rate and Rhythm: Normal rate and regular rhythm.      Heart sounds: Normal heart sounds. No murmur heard.     No friction rub.   Pulmonary:      Effort: Pulmonary effort is normal. No respiratory distress.      Breath sounds: Normal breath sounds. No wheezing or rales.   Abdominal:      General: Bowel sounds are normal. There is no distension.      Palpations: Abdomen is soft.      Tenderness: There is no abdominal tenderness. There is no guarding or rebound.   Musculoskeletal:         " General: No tenderness. Normal range of motion.      Cervical back: Normal range of motion and neck supple.   Lymphadenopathy:      Cervical: No cervical adenopathy.   Skin:     General: Skin is warm and dry.      Findings: No erythema or rash.   Neurological:      Mental Status: He is alert and oriented to person, place, and time.      Cranial Nerves: No cranial nerve deficit.      Coordination: Coordination normal.   Psychiatric:         Behavior: Behavior normal.         Thought Content: Thought content normal.       Nikolai Vicente PA-C

## 2024-08-29 ENCOUNTER — OFFICE VISIT (OUTPATIENT)
Dept: FAMILY MEDICINE CLINIC | Facility: CLINIC | Age: 39
End: 2024-08-29
Payer: COMMERCIAL

## 2024-08-29 VITALS
BODY MASS INDEX: 37.3 KG/M2 | HEIGHT: 75 IN | SYSTOLIC BLOOD PRESSURE: 110 MMHG | OXYGEN SATURATION: 96 % | HEART RATE: 90 BPM | DIASTOLIC BLOOD PRESSURE: 80 MMHG | WEIGHT: 300 LBS

## 2024-08-29 DIAGNOSIS — E78.2 MIXED HYPERLIPIDEMIA: ICD-10-CM

## 2024-08-29 DIAGNOSIS — M25.50 ARTHRALGIA, UNSPECIFIED JOINT: Primary | ICD-10-CM

## 2024-08-29 DIAGNOSIS — M54.16 LUMBAR BACK PAIN WITH RADICULOPATHY AFFECTING LOWER EXTREMITY: ICD-10-CM

## 2024-08-29 DIAGNOSIS — M10.9 GOUT, UNSPECIFIED CAUSE, UNSPECIFIED CHRONICITY, UNSPECIFIED SITE: ICD-10-CM

## 2024-08-29 PROCEDURE — 99214 OFFICE O/P EST MOD 30 MIN: CPT | Performed by: PHYSICIAN ASSISTANT

## 2024-08-29 RX ORDER — ACETAMINOPHEN 500 MG
500 TABLET ORAL EVERY 6 HOURS PRN
COMMUNITY

## 2024-08-29 RX ORDER — PREDNISONE 10 MG/1
TABLET ORAL
Qty: 30 TABLET | Refills: 0 | Status: SHIPPED | OUTPATIENT
Start: 2024-08-29 | End: 2024-09-08

## 2024-08-29 NOTE — PROGRESS NOTES
Ambulatory Visit  Name: Kel Amin      : 1985      MRN: [id number]  Encounter Provider: Nikolai Vicente PA-C  Encounter Date: 2024   Encounter department: [facility]  Patient Instructions   Assessment/plan:  1.  Gout-patient with signs of significant flare and widespread inflammation-would recommend he complete ordered labs.  He has previously been intolerant of preventative allopurinol.  His last uric acid level was 13.4.  He has resumed colchicine therapy yesterday.  Would recommend adding prednisone 10 mg, 5 tablets daily for 2 days, then 4 tablets daily for 2 days, then 3 tablets daily for 2 days, then 2 tablets daily for 2 days, then 1 tablet daily for 2 days.  Thirty pills with no refills work note will be provided until Tuesday.  Would recommend consult with rheumatology since he has been intolerant of allopurinol to see if there is any other preventative options.  2.  Low back pain-likely flared secondary to gout.  Patient does have history of lumbar radiculopathy.  However if this does not improve in the next week consider further physical therapy and imaging.  3.  Mixed hyperlipidemia-presently stable.  Will reassess with labs that are ordered.    Assessment & Plan   1. Arthralgia, unspecified joint  -     Lyme Total AB W Reflex to IGM/IGG; Future  -     predniSONE 10 mg tablet; 5,5,4,4,3,3,2,2,1,1  2. Gout, unspecified cause, unspecified chronicity, unspecified site  -     Ambulatory Referral to Rheumatology; Future  3. Lumbar back pain with radiculopathy affecting lower extremity  4. Mixed hyperlipidemia       History of Present Illness     HPI: This is a 39-year-old gentleman that presents to the office accompanied by his wife.  He is seen for what seems to be acute gout episode.  Patient has struggled with gout much of his life.  He has had significant episodes which lead to widespread debilitation and joint pains.  He has tried preventative medications  "previously with allopurinol but had significant myalgia and reaction that he was unable to continue taking it.  He last had uric acid level in November of last year which was 13.4.  He just started colchicine therapy yesterday and has seen little benefit.  He is also using over-the-counter Aleve and Tylenol as necessary.  He is having difficulty ambulating because of such pain.  He also has history of hyperlipidemia and does have lab orders to be completed to have this reassessed.        Review of Systems   Constitutional:  Negative for chills, fatigue and fever.   HENT:  Negative for congestion, ear pain and sinus pressure.    Eyes:  Negative for visual disturbance.   Respiratory:  Negative for cough, chest tightness and shortness of breath.    Cardiovascular:  Negative for chest pain and palpitations.   Gastrointestinal:  Negative for diarrhea, nausea and vomiting.   Endocrine: Negative for polyuria.   Genitourinary:  Negative for dysuria and frequency.   Musculoskeletal:  Positive for arthralgias, back pain, gait problem and myalgias.   Skin:  Negative for pallor and rash.   Neurological:  Negative for dizziness, weakness, light-headedness, numbness and headaches.   Psychiatric/Behavioral:  Negative for agitation, behavioral problems and sleep disturbance.    All other systems reviewed and are negative.      Objective     /80 (BP Location: Left arm, Patient Position: Sitting, Cuff Size: Standard)   Pulse 90   Ht 6' 3\" (1.905 m)   Wt 136 kg (300 lb)   SpO2 96%   BMI 37.50 kg/m²     Physical Exam  Constitutional:       General: He is not in acute distress.     Appearance: He is well-developed.      Comments: Patient is diaphoretic, appears uncomfortable, slow to move and ambulating with antalgic gait.   HENT:      Head: Normocephalic and atraumatic.      Right Ear: Tympanic membrane normal.      Left Ear: Tympanic membrane normal.   Eyes:      Conjunctiva/sclera: Conjunctivae normal.   Cardiovascular:    "   Rate and Rhythm: Normal rate and regular rhythm.   Pulmonary:      Effort: Pulmonary effort is normal.   Abdominal:      General: Abdomen is flat. Bowel sounds are normal. There is no distension.      Palpations: Abdomen is soft. There is no mass.   Musculoskeletal:         General: Normal range of motion.      Cervical back: Normal range of motion.   Skin:     General: Skin is warm.      Findings: No rash.   Neurological:      Mental Status: He is alert and oriented to person, place, and time.   Psychiatric:         Mood and Affect: Mood normal.       Administrative Statements

## 2024-08-29 NOTE — LETTER
August 29, 2024     Patient: Kel Amin  YOB: 1985  Date of Visit: 8/29/2024      To Whom it May Concern:    Kel Amin is under my professional care. Kel was seen in my office on 8/29/2024. Kel may return to work on 9/6/2024 .    If you have any questions or concerns, please don't hesitate to call.         Sincerely,          Nikolai Vicente PA-C        CC: No Recipients

## 2024-08-29 NOTE — PATIENT INSTRUCTIONS
Assessment/plan:  1.  Gout-patient with signs of significant flare and widespread inflammation-would recommend he complete ordered labs.  He has previously been intolerant of preventative allopurinol.  His last uric acid level was 13.4.  He has resumed colchicine therapy yesterday.  Would recommend adding prednisone 10 mg, 5 tablets daily for 2 days, then 4 tablets daily for 2 days, then 3 tablets daily for 2 days, then 2 tablets daily for 2 days, then 1 tablet daily for 2 days.  Thirty pills with no refills work note will be provided until Tuesday.  Would recommend consult with rheumatology since he has been intolerant of allopurinol to see if there is any other preventative options.  2.  Low back pain-likely flared secondary to gout.  Patient does have history of lumbar radiculopathy.  However if this does not improve in the next week consider further physical therapy and imaging.  3.  Mixed hyperlipidemia-presently stable.  Will reassess with labs that are ordered.

## 2024-08-30 ENCOUNTER — TELEPHONE (OUTPATIENT)
Dept: OTHER | Facility: OTHER | Age: 39
End: 2024-08-30

## 2024-08-30 NOTE — LETTER
August 30, 2024     Patient: Kel Amin  YOB: 1985  Date of Visit: 8/30/2024      To Whom it May Concern:    Kel Amin is under my professional care. Kel was seen in my office on 8/29/2024. Kel may return to work on 9/3/2024.    If you have any questions or concerns, please don't hesitate to call.         Sincerely,          Romy Michel        CC: No Recipients

## 2024-08-30 NOTE — TELEPHONE ENCOUNTER
Patient saw Dr. Vicente and was given a letter.  He needs the date on note changed.  He will be going back to work  9/3/2024 NOT 9/6.  Please call patient when note is ready.      Thank you

## 2024-10-29 DIAGNOSIS — I31.9 PERICARDITIS: ICD-10-CM

## 2024-10-29 DIAGNOSIS — J45.20 MILD INTERMITTENT ASTHMA WITHOUT COMPLICATION: ICD-10-CM

## 2024-10-30 RX ORDER — DICLOFENAC SODIUM 75 MG/1
75 TABLET, DELAYED RELEASE ORAL 2 TIMES DAILY
Qty: 60 TABLET | Refills: 0 | Status: SHIPPED | OUTPATIENT
Start: 2024-10-30

## 2024-10-30 RX ORDER — COLCHICINE 0.6 MG/1
0.6 TABLET ORAL 2 TIMES DAILY
Qty: 60 TABLET | Refills: 0 | Status: SHIPPED | OUTPATIENT
Start: 2024-10-30

## 2024-11-05 ENCOUNTER — OFFICE VISIT (OUTPATIENT)
Dept: RHEUMATOLOGY | Facility: CLINIC | Age: 39
End: 2024-11-05
Payer: COMMERCIAL

## 2024-11-05 ENCOUNTER — APPOINTMENT (OUTPATIENT)
Dept: LAB | Facility: IMAGING CENTER | Age: 39
End: 2024-11-05
Payer: COMMERCIAL

## 2024-11-05 ENCOUNTER — OFFICE VISIT (OUTPATIENT)
Dept: FAMILY MEDICINE CLINIC | Facility: CLINIC | Age: 39
End: 2024-11-05
Payer: COMMERCIAL

## 2024-11-05 ENCOUNTER — PATIENT MESSAGE (OUTPATIENT)
Dept: RHEUMATOLOGY | Facility: CLINIC | Age: 39
End: 2024-11-05

## 2024-11-05 VITALS
BODY MASS INDEX: 38.67 KG/M2 | WEIGHT: 311 LBS | DIASTOLIC BLOOD PRESSURE: 78 MMHG | SYSTOLIC BLOOD PRESSURE: 122 MMHG | HEART RATE: 78 BPM | HEIGHT: 75 IN | OXYGEN SATURATION: 97 %

## 2024-11-05 VITALS
HEIGHT: 75 IN | HEART RATE: 78 BPM | BODY MASS INDEX: 38.54 KG/M2 | SYSTOLIC BLOOD PRESSURE: 130 MMHG | WEIGHT: 310 LBS | DIASTOLIC BLOOD PRESSURE: 92 MMHG | OXYGEN SATURATION: 96 %

## 2024-11-05 DIAGNOSIS — M10.9 GOUT, UNSPECIFIED CAUSE, UNSPECIFIED CHRONICITY, UNSPECIFIED SITE: ICD-10-CM

## 2024-11-05 DIAGNOSIS — M25.50 ARTHRALGIA, UNSPECIFIED JOINT: ICD-10-CM

## 2024-11-05 DIAGNOSIS — M1A.09X0 IDIOPATHIC CHRONIC GOUT OF MULTIPLE SITES WITHOUT TOPHUS: ICD-10-CM

## 2024-11-05 DIAGNOSIS — R73.9 HYPERGLYCEMIA: ICD-10-CM

## 2024-11-05 DIAGNOSIS — E78.2 MIXED HYPERLIPIDEMIA: ICD-10-CM

## 2024-11-05 DIAGNOSIS — Z00.00 ANNUAL PHYSICAL EXAM: Primary | ICD-10-CM

## 2024-11-05 LAB
ALBUMIN SERPL BCG-MCNC: 4.2 G/DL (ref 3.5–5)
ALP SERPL-CCNC: 116 U/L (ref 34–104)
ALT SERPL W P-5'-P-CCNC: 30 U/L (ref 7–52)
ANION GAP SERPL CALCULATED.3IONS-SCNC: 11 MMOL/L (ref 4–13)
AST SERPL W P-5'-P-CCNC: 25 U/L (ref 13–39)
B BURGDOR IGG+IGM SER QL IA: NEGATIVE
BASOPHILS # BLD AUTO: 0.1 THOUSANDS/ΜL (ref 0–0.1)
BASOPHILS NFR BLD AUTO: 1 % (ref 0–1)
BILIRUB SERPL-MCNC: 0.24 MG/DL (ref 0.2–1)
BUN SERPL-MCNC: 19 MG/DL (ref 5–25)
CALCIUM SERPL-MCNC: 9.3 MG/DL (ref 8.4–10.2)
CHLORIDE SERPL-SCNC: 105 MMOL/L (ref 96–108)
CHOLEST SERPL-MCNC: 233 MG/DL
CO2 SERPL-SCNC: 24 MMOL/L (ref 21–32)
CREAT SERPL-MCNC: 1.32 MG/DL (ref 0.6–1.3)
EOSINOPHIL # BLD AUTO: 0.25 THOUSAND/ΜL (ref 0–0.61)
EOSINOPHIL NFR BLD AUTO: 3 % (ref 0–6)
ERYTHROCYTE [DISTWIDTH] IN BLOOD BY AUTOMATED COUNT: 15.4 % (ref 11.6–15.1)
EST. AVERAGE GLUCOSE BLD GHB EST-MCNC: 117 MG/DL
GFR SERPL CREATININE-BSD FRML MDRD: 66 ML/MIN/1.73SQ M
GLUCOSE P FAST SERPL-MCNC: 106 MG/DL (ref 65–99)
HBA1C MFR BLD: 5.7 %
HCT VFR BLD AUTO: 47.7 % (ref 36.5–49.3)
HDLC SERPL-MCNC: 31 MG/DL
HGB BLD-MCNC: 15.2 G/DL (ref 12–17)
IMM GRANULOCYTES # BLD AUTO: 0.06 THOUSAND/UL (ref 0–0.2)
IMM GRANULOCYTES NFR BLD AUTO: 1 % (ref 0–2)
LDLC SERPL DIRECT ASSAY-MCNC: 111 MG/DL (ref 0–100)
LYMPHOCYTES # BLD AUTO: 2.8 THOUSANDS/ΜL (ref 0.6–4.47)
LYMPHOCYTES NFR BLD AUTO: 36 % (ref 14–44)
MCH RBC QN AUTO: 25.9 PG (ref 26.8–34.3)
MCHC RBC AUTO-ENTMCNC: 31.9 G/DL (ref 31.4–37.4)
MCV RBC AUTO: 81 FL (ref 82–98)
MONOCYTES # BLD AUTO: 0.5 THOUSAND/ΜL (ref 0.17–1.22)
MONOCYTES NFR BLD AUTO: 6 % (ref 4–12)
NEUTROPHILS # BLD AUTO: 4.13 THOUSANDS/ΜL (ref 1.85–7.62)
NEUTS SEG NFR BLD AUTO: 53 % (ref 43–75)
NRBC BLD AUTO-RTO: 0 /100 WBCS
PLATELET # BLD AUTO: 335 THOUSANDS/UL (ref 149–390)
PMV BLD AUTO: 9.8 FL (ref 8.9–12.7)
POTASSIUM SERPL-SCNC: 4.4 MMOL/L (ref 3.5–5.3)
PROT SERPL-MCNC: 7.4 G/DL (ref 6.4–8.4)
RBC # BLD AUTO: 5.87 MILLION/UL (ref 3.88–5.62)
SODIUM SERPL-SCNC: 140 MMOL/L (ref 135–147)
TRIGL SERPL-MCNC: 451 MG/DL
TSH SERPL DL<=0.05 MIU/L-ACNC: 1.59 UIU/ML (ref 0.45–4.5)
URATE SERPL-MCNC: 10.1 MG/DL (ref 3.5–8.5)
WBC # BLD AUTO: 7.84 THOUSAND/UL (ref 4.31–10.16)

## 2024-11-05 PROCEDURE — 36415 COLL VENOUS BLD VENIPUNCTURE: CPT

## 2024-11-05 PROCEDURE — 86618 LYME DISEASE ANTIBODY: CPT

## 2024-11-05 PROCEDURE — 99204 OFFICE O/P NEW MOD 45 MIN: CPT | Performed by: INTERNAL MEDICINE

## 2024-11-05 PROCEDURE — 99396 PREV VISIT EST AGE 40-64: CPT | Performed by: PHYSICIAN ASSISTANT

## 2024-11-05 RX ORDER — COLCHICINE 0.6 MG/1
0.6 TABLET ORAL 2 TIMES DAILY
Qty: 30 TABLET | Refills: 6 | Status: SHIPPED | OUTPATIENT
Start: 2024-11-05 | End: 2024-11-05

## 2024-11-05 RX ORDER — ALLOPURINOL 300 MG/1
300 TABLET ORAL DAILY
Qty: 30 TABLET | Refills: 6 | Status: SHIPPED | OUTPATIENT
Start: 2024-11-05 | End: 2024-11-05

## 2024-11-05 RX ORDER — PREDNISONE 10 MG/1
TABLET ORAL
Qty: 70 TABLET | Refills: 1 | Status: SHIPPED | OUTPATIENT
Start: 2024-11-05 | End: 2024-11-06 | Stop reason: SDUPTHER

## 2024-11-05 RX ORDER — COLCHICINE 0.6 MG/1
0.6 TABLET ORAL 2 TIMES DAILY
Qty: 30 TABLET | Refills: 6 | Status: SHIPPED | OUTPATIENT
Start: 2024-11-05 | End: 2024-11-06 | Stop reason: SDUPTHER

## 2024-11-05 RX ORDER — ALLOPURINOL 300 MG/1
300 TABLET ORAL DAILY
Qty: 30 TABLET | Refills: 6 | Status: SHIPPED | OUTPATIENT
Start: 2024-11-05 | End: 2024-11-06 | Stop reason: SDUPTHER

## 2024-11-05 RX ORDER — PREDNISONE 10 MG/1
TABLET ORAL
Qty: 70 TABLET | Refills: 1 | Status: SHIPPED | OUTPATIENT
Start: 2024-11-05 | End: 2024-11-05

## 2024-11-05 NOTE — PROGRESS NOTES
Adult Annual Physical  Name: Kel Amin      : 1985      MRN: [id number]  Encounter Provider: Nikolai Vicente PA-C  Encounter Date: 2024   Encounter department: [facility]  Patient Instructions       Assessment/plan:  1.  Annual physical exam patient continues healthy diet and exercise.  He continues to follow with dentist regularly.  He did have blood work completed this morning which is not yet available.  There is no significant family history of colon cancer or prostate cancer that he is aware of.  Vaccines were reviewed.  Flu vaccine declined today.  2.  Mixed hyperlipidemia-stable, labs are pending.  3.  Gout-presently stable, following with rheumatology.  Patient is giving a try with allopurinol again despite history of myalgia.  4.  Hyperglycemia-stable, labs pending from this morning.  Continue healthy diet and exercise efforts.    Assessment & Plan    Immunizations and preventive care screenings were discussed with patient today. Appropriate education was printed on patient's after visit summary.        Counseling:  Dental Health: discussed importance of regular tooth brushing, flossing, and dental visits.  Exercise: the importance of regular exercise/physical activity was discussed. Recommend exercise 3-5 times per week for at least 30 minutes.       Depression Screening and Follow-up Plan: Patient was screened for depression during today's encounter. They screened negative with a PHQ-2 score of 0.        History of Present Illness     Adult Annual Physical:  Patient presents for annual physical. HPI: This is a 39-year-old gentleman that presents to the office for annual physical exam and follow-up of chronic health conditions.  He has started seeing rheumatology for ongoing gout problems.  He was recently restarted on allopurinol.  So have history of hyperlipidemia and hyperglycemia.  He went for labs this morning and results are still pending.  Has not had any  "chest pains or shortness of breath.  He does continue regular physical exercise.  He has been following with a dentist regularly.  He does not have any known family history of colon cancer or prostate cancer..     Diet and Physical Activity:  - Diet/Nutrition: well balanced diet.  - Exercise: moderate cardiovascular exercise.    Depression Screening:  - PHQ-2 Score: 0    General Health:  - Sleep: sleeps well.  - Hearing: normal hearing bilateral ears.  - Vision: no vision problems.  - Dental: regular dental visits.    /GYN Health:    - History of STDs: no     Health:  - History of STDs: no.     Advanced Care Planning:  - Has an advanced directive?: no    - Has a durable medical POA?: no    - ACP document given to patient?: no      Review of Systems   Constitutional:  Negative for chills, fatigue and fever.   HENT:  Negative for congestion, ear pain and sinus pressure.    Eyes:  Negative for visual disturbance.   Respiratory:  Negative for cough, chest tightness and shortness of breath.    Cardiovascular:  Negative for chest pain and palpitations.   Gastrointestinal:  Negative for diarrhea, nausea and vomiting.   Endocrine: Negative for polyuria.   Genitourinary:  Negative for dysuria and frequency.   Musculoskeletal:  Negative for arthralgias and myalgias.   Skin:  Negative for pallor and rash.   Neurological:  Negative for dizziness, weakness, light-headedness, numbness and headaches.   Psychiatric/Behavioral:  Negative for agitation, behavioral problems and sleep disturbance.    All other systems reviewed and are negative.        Objective     /78 (BP Location: Left arm, Patient Position: Sitting, Cuff Size: Large)   Pulse 78   Ht 6' 3\" (1.905 m)   Wt (!) 141 kg (311 lb)   SpO2 97%   BMI 38.87 kg/m²     Physical Exam  Constitutional:       General: He is not in acute distress.     Appearance: He is well-developed.   HENT:      Head: Normocephalic and atraumatic.      Right Ear: Tympanic membrane " normal.      Left Ear: Tympanic membrane normal.   Eyes:      Conjunctiva/sclera: Conjunctivae normal.   Cardiovascular:      Rate and Rhythm: Normal rate and regular rhythm.   Pulmonary:      Effort: Pulmonary effort is normal.   Abdominal:      General: Abdomen is flat. Bowel sounds are normal. There is no distension.      Palpations: Abdomen is soft. There is no mass.   Musculoskeletal:         General: Normal range of motion.      Cervical back: Normal range of motion.   Skin:     General: Skin is warm.      Findings: No rash.   Neurological:      Mental Status: He is alert and oriented to person, place, and time.   Psychiatric:         Mood and Affect: Mood normal.

## 2024-11-05 NOTE — PROGRESS NOTES
Ambulatory Visit  Name: Kel Amin      : 1985      MRN: [id number]  Encounter Provider: Nehemiah Pandey MD  Encounter Date: 2024   Encounter department: [facility]    Assessment & Plan  Gout, unspecified cause, unspecified chronicity, unspecified site  Kel Amin is a 39 y.o. male who presents as a referral for Gout.    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 Tried allopurinol in the past in  without any prophylaxis and flared up and had to stop allopurinol.    Uric acid is persistently elevated with most recent level > 13    We recommend starting on urate lowering therapy with allopurinol starting at 300 mg daily, along with steroid taper and colchicine for prophylaxis    Patient is hesitant to restart allopurinol now considering risk of flare-up during work and wants to start during  weekend when he is at home for most period..    RTC in 3 months    Orders:    Ambulatory Referral to Rheumatology    allopurinol (ZYLOPRIM) 300 mg tablet; Take 1 tablet (300 mg total) by mouth daily    predniSONE 10 mg tablet; Take 4 tablets daily for a week, then 3 daily for a week, then 2 daily for a week then 1 daily for a week    colchicine (COLCRYS) 0.6 mg tablet; Take 1 tablet (0.6 mg total) by mouth 2 (two) times a day      History of Present Illness     Kel Amin is a 39 y.o. male who presents as a referral for Gout. He was initially diagnosed with Gout in 2020 and since then he was having multiple flare-ups in his elbows, wrists, hands, ankles and feet.    Over the past year, patient had nearly 4-6 flares. And he has been managing his gout flare ups with colchicine but never saw a rheumatologist.     He tried allopurinol once in  from primary care and stopped it as had developed  severe shoulder pain but he was not taking colchicine at the time of initiation of allopurinol    His uric acid is uncontrolled and most recent one is > 13    He also has more prominent tophi on the extensor surface of his right elbow. Today, he is not in any flare-up    Patient does not know his biological father so he does not know any family history of Gout    History obtained from : patient    Review of Systems  Rest of ROS are negative except as noted in HPI    Medical History Reviewed by provider this encounter:       Past Medical History   Past Medical History:   Diagnosis Date    Arthritis      No past surgical history on file.  Family History   Problem Relation Age of Onset    Hypertension Mother     No Known Problems Father      Current Outpatient Medications on File Prior to Visit   Medication Sig Dispense Refill    acetaminophen (TYLENOL) 500 mg tablet Take 500 mg by mouth every 6 (six) hours as needed for mild pain      albuterol (2.5 mg/3 mL) 0.083 % nebulizer solution Take 3 mL (2.5 mg total) by nebulization every 6 (six) hours as needed for wheezing or shortness of breath 180 mL 5    albuterol (Ventolin HFA) 90 mcg/act inhaler Inhale 2 puffs every 6 (six) hours as needed for wheezing 8 g 1    colchicine (COLCRYS) 0.6 mg tablet Take 1 tablet (0.6 mg total) by mouth 2 (two) times a day 60 tablet 0    diclofenac (VOLTAREN) 75 mg EC tablet Take 1 tablet (75 mg total) by mouth 2 (two) times a day 60 tablet 0     No current facility-administered medications on file prior to visit.     Allergies   Allergen Reactions    Gabapentin     Zyloprim [Allopurinol] Myalgia      Current Outpatient Medications on File Prior to Visit   Medication Sig Dispense Refill    acetaminophen (TYLENOL) 500 mg tablet Take 500 mg by mouth every 6 (six) hours as needed for mild pain      albuterol (2.5 mg/3 mL) 0.083 % nebulizer solution Take 3 mL (2.5 mg total) by nebulization every 6 (six) hours as needed for wheezing or  "shortness of breath 180 mL 5    albuterol (Ventolin HFA) 90 mcg/act inhaler Inhale 2 puffs every 6 (six) hours as needed for wheezing 8 g 1    colchicine (COLCRYS) 0.6 mg tablet Take 1 tablet (0.6 mg total) by mouth 2 (two) times a day 60 tablet 0    diclofenac (VOLTAREN) 75 mg EC tablet Take 1 tablet (75 mg total) by mouth 2 (two) times a day 60 tablet 0     No current facility-administered medications on file prior to visit.      Social History     Tobacco Use    Smoking status: Never    Smokeless tobacco: Never   Vaping Use    Vaping status: Never Used   Substance and Sexual Activity    Alcohol use: Never    Drug use: Never    Sexual activity: Yes         Objective     /92   Pulse 78   Ht 6' 3\" (1.905 m)   Wt (!) 141 kg (310 lb)   SpO2 96%   BMI 38.75 kg/m²     Physical Exam  Constitutional:       Appearance: Normal appearance.   HENT:      Head: Normocephalic and atraumatic.   Musculoskeletal:      Comments: Prominent tophi noted on extensor surface of right elbow  No swelling, tenderness, erythema or deformities of wrists, MCPs, PIPs, DIPs, elbows, shoulders, knees and ankles   Skin:     Findings: No rash.   Neurological:      Mental Status: He is alert and oriented to person, place, and time.         "

## 2024-11-05 NOTE — PATIENT INSTRUCTIONS
Assessment/plan:  1.  Annual physical exam patient continues healthy diet and exercise.  He continues to follow with dentist regularly.  He did have blood work completed this morning which is not yet available.  There is no significant family history of colon cancer or prostate cancer that he is aware of.  Vaccines were reviewed.  Flu vaccine declined today.  2.  Mixed hyperlipidemia-stable, labs are pending.  3.  Gout-presently stable, following with rheumatology.  Patient is giving a try with allopurinol again despite history of myalgia.  4.  Hyperglycemia-stable, labs pending from this morning.  Continue healthy diet and exercise efforts.

## 2024-11-06 RX ORDER — COLCHICINE 0.6 MG/1
0.6 TABLET ORAL 2 TIMES DAILY
Qty: 30 TABLET | Refills: 6 | Status: SHIPPED | OUTPATIENT
Start: 2024-11-06

## 2024-11-06 RX ORDER — ALLOPURINOL 300 MG/1
300 TABLET ORAL DAILY
Qty: 30 TABLET | Refills: 6 | Status: SHIPPED | OUTPATIENT
Start: 2024-11-06

## 2024-11-06 RX ORDER — PREDNISONE 10 MG/1
TABLET ORAL
Qty: 70 TABLET | Refills: 1 | Status: SHIPPED | OUTPATIENT
Start: 2024-11-06

## 2024-11-20 DIAGNOSIS — J45.20 MILD INTERMITTENT ASTHMA WITHOUT COMPLICATION: ICD-10-CM

## 2024-11-21 RX ORDER — DICLOFENAC SODIUM 75 MG/1
75 TABLET, DELAYED RELEASE ORAL 2 TIMES DAILY
Qty: 60 TABLET | Refills: 5 | Status: SHIPPED | OUTPATIENT
Start: 2024-11-21

## 2025-01-01 DIAGNOSIS — M10.9 GOUT, UNSPECIFIED CAUSE, UNSPECIFIED CHRONICITY, UNSPECIFIED SITE: ICD-10-CM

## 2025-01-02 RX ORDER — COLCHICINE 0.6 MG/1
0.6 TABLET ORAL 2 TIMES DAILY
Qty: 30 TABLET | Refills: 5 | Status: SHIPPED | OUTPATIENT
Start: 2025-01-02

## 2025-01-08 DIAGNOSIS — M10.9 GOUT, UNSPECIFIED CAUSE, UNSPECIFIED CHRONICITY, UNSPECIFIED SITE: ICD-10-CM

## 2025-01-13 RX ORDER — PREDNISONE 10 MG/1
TABLET ORAL
Qty: 70 TABLET | Refills: 0 | Status: SHIPPED | OUTPATIENT
Start: 2025-01-13

## 2025-01-13 NOTE — TELEPHONE ENCOUNTER
Spoke with pt regarding refill request and scheduling follow up appt. Pt states unable to make appt right now as he is at work. He will  script later

## 2025-03-02 DIAGNOSIS — M10.9 GOUT, UNSPECIFIED CAUSE, UNSPECIFIED CHRONICITY, UNSPECIFIED SITE: ICD-10-CM

## 2025-03-03 DIAGNOSIS — I31.9 PERICARDITIS: ICD-10-CM

## 2025-03-03 RX ORDER — COLCHICINE 0.6 MG/1
0.6 TABLET ORAL 2 TIMES DAILY
Qty: 30 TABLET | Refills: 5 | Status: SHIPPED | OUTPATIENT
Start: 2025-03-03

## 2025-03-04 RX ORDER — COLCHICINE 0.6 MG/1
0.6 TABLET ORAL 2 TIMES DAILY
Qty: 60 TABLET | Refills: 0 | OUTPATIENT
Start: 2025-03-04

## 2025-03-10 ENCOUNTER — OFFICE VISIT (OUTPATIENT)
Dept: FAMILY MEDICINE CLINIC | Facility: CLINIC | Age: 40
End: 2025-03-10
Payer: COMMERCIAL

## 2025-03-10 VITALS
HEART RATE: 82 BPM | WEIGHT: 314 LBS | SYSTOLIC BLOOD PRESSURE: 128 MMHG | DIASTOLIC BLOOD PRESSURE: 80 MMHG | BODY MASS INDEX: 39.04 KG/M2 | OXYGEN SATURATION: 97 % | HEIGHT: 75 IN

## 2025-03-10 DIAGNOSIS — M79.89 SWELLING OF LEFT HAND: Primary | ICD-10-CM

## 2025-03-10 DIAGNOSIS — E78.2 MIXED HYPERLIPIDEMIA: ICD-10-CM

## 2025-03-10 PROCEDURE — 99214 OFFICE O/P EST MOD 30 MIN: CPT | Performed by: NURSE PRACTITIONER

## 2025-03-10 RX ORDER — SULFAMETHOXAZOLE AND TRIMETHOPRIM 800; 160 MG/1; MG/1
1 TABLET ORAL EVERY 12 HOURS SCHEDULED
Qty: 20 TABLET | Refills: 0 | Status: SHIPPED | OUTPATIENT
Start: 2025-03-10 | End: 2025-03-20

## 2025-03-10 RX ORDER — PREDNISONE 10 MG/1
TABLET ORAL
Qty: 30 TABLET | Refills: 0 | Status: SHIPPED | OUTPATIENT
Start: 2025-03-10

## 2025-03-10 NOTE — PROGRESS NOTES
Name: Kel Amin      : 1985      MRN: [id number]  Encounter Provider: SHAYLA Davis  Encounter Date: 3/10/2025   Encounter department: [facility] PRIMARY CARE  :  Assessment & Plan  Swelling of left hand  Patient symptoms are consistent with cellulitis of the left hand.  10-day course of Bactrim and prednisone taper were ordered for treatment.  I would like to definitively rule out a gout flare as well so I did order a uric acid level for the patient to complete.  He was also advised that he can continue colchicine for the time being until we receive the uric acid results.  I did also advise him to begin icing the area.  Orders:  •  predniSONE 10 mg tablet; Use 5 tablets for 2 days. Use 4 tablets for 2 days. Use 3 tablets for 2 days. Use 2 tablets for 2 days. Use 1 tablet for 2 days.  •  sulfamethoxazole-trimethoprim (BACTRIM DS) 800-160 mg per tablet; Take 1 tablet by mouth every 12 (twelve) hours for 10 days  •  Uric acid; Future    Mixed hyperlipidemia  Patient was advised to continue to limit fatty and fried foods in his diet.             Depression Screening and Follow-up Plan: Patient was screened for depression during today's encounter. They screened negative with a PHQ-2 score of 0.        History of Present Illness   Swelling of left hand: Patient reports over the last 2 weeks he has been noting swelling of his left hand.  He does report that over the past few days the swelling has worsened.  He does also note some redness and warmth to touch throughout the dorsal portion of the left hand as well.  Of note, the patient does have a history of gout and he reports that he did eat some foods recently that he tries to avoid so he felt that this may be a gout flare.  He has been taking colchicine that he has on hand at home but this has not improved his symptoms.  He denies any recent cuts, scrapes, or known trauma to the hand.    Hyperlipidemia: Patient's most recent lipid panel  "showed elevated total cholesterol, triglycerides, and LDL.  Patient is not currently taking cholesterol medication.      Review of Systems   Constitutional:  Negative for chills and fever.   HENT:  Negative for ear pain and sore throat.    Eyes:  Negative for pain and visual disturbance.   Respiratory:  Negative for cough, chest tightness, shortness of breath and wheezing.    Cardiovascular:  Negative for chest pain, palpitations and leg swelling.   Gastrointestinal:  Negative for abdominal pain, constipation, diarrhea, nausea and vomiting.   Endocrine: Negative for cold intolerance and heat intolerance.   Genitourinary:  Negative for decreased urine volume, dysuria and hematuria.   Musculoskeletal:  Positive for arthralgias (left hand) and joint swelling (left hand). Negative for back pain and myalgias.   Skin:  Positive for color change (left hand). Negative for rash.   Allergic/Immunologic: Negative for environmental allergies.   Neurological:  Negative for dizziness, seizures, syncope, weakness, light-headedness, numbness and headaches.   Hematological:  Negative for adenopathy.   Psychiatric/Behavioral:  Negative for confusion. The patient is not nervous/anxious.    All other systems reviewed and are negative.      Objective   /80 (BP Location: Right arm, Patient Position: Sitting, Cuff Size: Standard)   Pulse 82   Ht 6' 3\" (1.905 m)   Wt (!) 142 kg (314 lb)   SpO2 97%   BMI 39.25 kg/m²      Physical Exam  Vitals and nursing note reviewed.   Constitutional:       General: He is not in acute distress.     Appearance: Normal appearance. He is well-developed. He is not ill-appearing.   HENT:      Head: Normocephalic.   Eyes:      Conjunctiva/sclera: Conjunctivae normal.   Cardiovascular:      Rate and Rhythm: Normal rate and regular rhythm.      Pulses: Normal pulses.           Carotid pulses are 2+ on the right side and 2+ on the left side.       Radial pulses are 2+ on the right side and 2+ on the " left side.        Posterior tibial pulses are 2+ on the right side and 2+ on the left side.      Heart sounds: Normal heart sounds. No murmur heard.  Pulmonary:      Effort: Pulmonary effort is normal. No respiratory distress.      Breath sounds: Normal breath sounds. No decreased breath sounds, wheezing, rhonchi or rales.   Abdominal:      General: Abdomen is flat. Bowel sounds are normal. There is no distension.      Palpations: Abdomen is soft.      Tenderness: There is no abdominal tenderness. There is no guarding.   Musculoskeletal:         General: No swelling. Normal range of motion.      Left hand: Swelling and tenderness present. No bony tenderness. Normal range of motion. Normal strength. Normal sensation.      Cervical back: Normal range of motion and neck supple.      Right lower leg: No edema.      Left lower leg: No edema.      Comments: Swelling, redness, and warmth to touch noted throughout the dorsal portion of the patient's left hand.  Patient did also report tenderness with palpation throughout the left hand.  No discharge was noted from the affected areas.   Skin:     General: Skin is warm and dry.      Capillary Refill: Capillary refill takes less than 2 seconds.   Neurological:      General: No focal deficit present.      Mental Status: He is alert and oriented to person, place, and time.   Psychiatric:         Mood and Affect: Mood normal.         Behavior: Behavior normal.         Thought Content: Thought content normal.         Judgment: Judgment normal.

## 2025-03-10 NOTE — ASSESSMENT & PLAN NOTE
Patient symptoms are consistent with cellulitis of the left hand.  10-day course of Bactrim and prednisone taper were ordered for treatment.  I would like to definitively rule out a gout flare as well so I did order a uric acid level for the patient to complete.  He was also advised that he can continue colchicine for the time being until we receive the uric acid results.  I did also advise him to begin icing the area.  Orders:  •  predniSONE 10 mg tablet; Use 5 tablets for 2 days. Use 4 tablets for 2 days. Use 3 tablets for 2 days. Use 2 tablets for 2 days. Use 1 tablet for 2 days.  •  sulfamethoxazole-trimethoprim (BACTRIM DS) 800-160 mg per tablet; Take 1 tablet by mouth every 12 (twelve) hours for 10 days  •  Uric acid; Future

## 2025-03-21 ENCOUNTER — APPOINTMENT (OUTPATIENT)
Dept: LAB | Facility: CLINIC | Age: 40
End: 2025-03-21
Payer: COMMERCIAL

## 2025-03-21 ENCOUNTER — OFFICE VISIT (OUTPATIENT)
Dept: FAMILY MEDICINE CLINIC | Facility: CLINIC | Age: 40
End: 2025-03-21
Payer: COMMERCIAL

## 2025-03-21 VITALS
WEIGHT: 312 LBS | DIASTOLIC BLOOD PRESSURE: 80 MMHG | SYSTOLIC BLOOD PRESSURE: 126 MMHG | BODY MASS INDEX: 38.79 KG/M2 | RESPIRATION RATE: 18 BRPM | TEMPERATURE: 97.1 F | HEIGHT: 75 IN | HEART RATE: 75 BPM | OXYGEN SATURATION: 96 %

## 2025-03-21 DIAGNOSIS — M79.89 SWELLING OF LEFT HAND: Primary | ICD-10-CM

## 2025-03-21 DIAGNOSIS — M79.89 SWELLING OF LEFT HAND: ICD-10-CM

## 2025-03-21 LAB
BASOPHILS # BLD AUTO: 0.08 THOUSANDS/ÂΜL (ref 0–0.1)
BASOPHILS NFR BLD AUTO: 1 % (ref 0–1)
EOSINOPHIL # BLD AUTO: 0.08 THOUSAND/ÂΜL (ref 0–0.61)
EOSINOPHIL NFR BLD AUTO: 1 % (ref 0–6)
ERYTHROCYTE [DISTWIDTH] IN BLOOD BY AUTOMATED COUNT: 16.3 % (ref 11.6–15.1)
HCT VFR BLD AUTO: 48.7 % (ref 36.5–49.3)
HGB BLD-MCNC: 14.9 G/DL (ref 12–17)
IMM GRANULOCYTES # BLD AUTO: 0.21 THOUSAND/UL (ref 0–0.2)
IMM GRANULOCYTES NFR BLD AUTO: 1 % (ref 0–2)
LYMPHOCYTES # BLD AUTO: 1.75 THOUSANDS/ÂΜL (ref 0.6–4.47)
LYMPHOCYTES NFR BLD AUTO: 12 % (ref 14–44)
MCH RBC QN AUTO: 25.4 PG (ref 26.8–34.3)
MCHC RBC AUTO-ENTMCNC: 30.6 G/DL (ref 31.4–37.4)
MCV RBC AUTO: 83 FL (ref 82–98)
MONOCYTES # BLD AUTO: 1.1 THOUSAND/ÂΜL (ref 0.17–1.22)
MONOCYTES NFR BLD AUTO: 8 % (ref 4–12)
NEUTROPHILS # BLD AUTO: 11.27 THOUSANDS/ÂΜL (ref 1.85–7.62)
NEUTS SEG NFR BLD AUTO: 77 % (ref 43–75)
NRBC BLD AUTO-RTO: 0 /100 WBCS
PLATELET # BLD AUTO: 369 THOUSANDS/UL (ref 149–390)
PMV BLD AUTO: 9.9 FL (ref 8.9–12.7)
RBC # BLD AUTO: 5.87 MILLION/UL (ref 3.88–5.62)
URATE SERPL-MCNC: 7.7 MG/DL (ref 3.5–8.5)
WBC # BLD AUTO: 14.49 THOUSAND/UL (ref 4.31–10.16)

## 2025-03-21 PROCEDURE — 84550 ASSAY OF BLOOD/URIC ACID: CPT

## 2025-03-21 PROCEDURE — 86618 LYME DISEASE ANTIBODY: CPT

## 2025-03-21 PROCEDURE — 99214 OFFICE O/P EST MOD 30 MIN: CPT | Performed by: PHYSICIAN ASSISTANT

## 2025-03-21 PROCEDURE — 36415 COLL VENOUS BLD VENIPUNCTURE: CPT

## 2025-03-21 PROCEDURE — 85025 COMPLETE CBC W/AUTO DIFF WBC: CPT | Performed by: PHYSICIAN ASSISTANT

## 2025-03-21 NOTE — LETTER
March 21, 2025     Patient: Kel Amin  YOB: 1985  Date of Visit: 3/21/2025      To Whom it May Concern:    Kel Amin is under my professional care. Kel was seen in my office on 3/21/2025. Kel may return to work on 3/24/25 .    If you have any questions or concerns, please don't hesitate to call.         Sincerely,          Nikolai Vicente PA-C        CC: No Recipients

## 2025-03-21 NOTE — ASSESSMENT & PLAN NOTE
Acute gout versus infectious cellulitis.  Would recommend CBC, Lyme titer, uric acid be completed today.  Would recommend discontinuing Bactrim therapy and using Augmentin 875 twice daily with food.  Recommend continuing colchicine, ice, Tylenol over-the-counter.  Work note will be provided for yesterday and today.  Follow-up Monday if not improving.  If he develops redness or streaking up the arm or swollen glands in the antecubital fossa he is to go to the emergency room.  Patient verbalizes understanding and agreement with plan.  Orders:  •  CBC and differential  •  Lyme Total AB W Reflex to IGM/IGG; Future  •  amoxicillin-clavulanate (AUGMENTIN) 875-125 mg per tablet; Take 1 tablet by mouth every 12 (twelve) hours for 10 days

## 2025-03-21 NOTE — PROGRESS NOTES
Name: Kel Amin      : 1985      MRN: [id number]  Encounter Provider: Nikolai Vicente PA-C  Encounter Date: 3/21/2025   [facility]  :  Assessment & Plan  Swelling of left hand  Acute gout versus infectious cellulitis.  Would recommend CBC, Lyme titer, uric acid be completed today.  Would recommend discontinuing Bactrim therapy and using Augmentin 875 twice daily with food.  Recommend continuing colchicine, ice, Tylenol over-the-counter.  Work note will be provided for yesterday and today.  Follow-up Monday if not improving.  If he develops redness or streaking up the arm or swollen glands in the antecubital fossa he is to go to the emergency room.  Patient verbalizes understanding and agreement with plan.  Orders:  •  CBC and differential  •  Lyme Total AB W Reflex to IGM/IGG; Future  •  amoxicillin-clavulanate (AUGMENTIN) 875-125 mg per tablet; Take 1 tablet by mouth every 12 (twelve) hours for 10 days           History of Present Illness   HPI: This is a 40-year-old gentleman that presents to the office with ongoing redness and swelling of the left wrist which started around  and has persisted since then.  He does not recall any trauma to the area.  He was seen in the office about 10 days ago and started on Bactrim and prednisone.  His symptoms did seem to be getting better and he was backing off of the Bactrim therapy.  Once his prednisone therapy stopped though his symptoms did seem to come back and worsen.  Patient has had history of significant gout episodes in the past but this seems to be going on for a prolonged period of time and limiting use of wrist and noticed some darkening of the skin around the second and third MCP joints.      Review of Systems   Constitutional:  Negative for chills, fatigue and fever.   HENT:  Negative for congestion, ear pain and sinus pressure.    Eyes:  Negative for visual disturbance.   Respiratory:  Negative for  "cough, chest tightness and shortness of breath.    Cardiovascular:  Negative for chest pain and palpitations.   Gastrointestinal:  Negative for diarrhea, nausea and vomiting.   Endocrine: Negative for polyuria.   Genitourinary:  Negative for dysuria and frequency.   Musculoskeletal:  Positive for arthralgias and myalgias.   Skin:  Negative for pallor and rash.   Neurological:  Negative for dizziness, weakness, light-headedness, numbness and headaches.   Psychiatric/Behavioral:  Negative for agitation, behavioral problems and sleep disturbance.    All other systems reviewed and are negative.      Objective   /80 (BP Location: Right arm, Patient Position: Sitting, Cuff Size: Adult)   Pulse 75   Temp (!) 97.1 °F (36.2 °C) (Tympanic)   Resp 18   Ht 6' 3\" (1.905 m)   Wt (!) 142 kg (312 lb)   SpO2 96%   BMI 39.00 kg/m²      Physical Exam  Constitutional:       Appearance: He is well-developed.   HENT:      Head: Normocephalic.   Cardiovascular:      Rate and Rhythm: Normal rate and regular rhythm.   Pulmonary:      Effort: Pulmonary effort is normal. No respiratory distress.      Breath sounds: Normal breath sounds.   Abdominal:      Palpations: Abdomen is soft.   Musculoskeletal:      Comments: Has limited range of motion with the left wrist.  There is tenderness around the wrist to palpation.  He does have swelling in the second and third MCP joints as well with some darkening of the skin dorsally.  There is no erythema or streaking proximally up the arm and there is no adenopathy of the antecubital fossa.   Neurological:      Mental Status: He is alert and oriented to person, place, and time.         "

## 2025-03-22 ENCOUNTER — RESULTS FOLLOW-UP (OUTPATIENT)
Dept: FAMILY MEDICINE CLINIC | Facility: CLINIC | Age: 40
End: 2025-03-22

## 2025-03-22 LAB — B BURGDOR IGG+IGM SER QL IA: NEGATIVE

## 2025-03-24 ENCOUNTER — RESULTS FOLLOW-UP (OUTPATIENT)
Dept: FAMILY MEDICINE CLINIC | Facility: CLINIC | Age: 40
End: 2025-03-24

## 2025-03-24 ENCOUNTER — NURSE TRIAGE (OUTPATIENT)
Dept: OTHER | Facility: OTHER | Age: 40
End: 2025-03-24

## 2025-03-24 ENCOUNTER — PATIENT MESSAGE (OUTPATIENT)
Dept: FAMILY MEDICINE CLINIC | Facility: CLINIC | Age: 40
End: 2025-03-24

## 2025-03-24 NOTE — TELEPHONE ENCOUNTER
"FOLLOW UP: Please call patient for follow up to recent visit.     REASON FOR CONVERSATION: Pain    SYMPTOMS: Ongoing pain, states it is worse and radiating down his arm.    OTHER: Needs an extended doctor's note. Also inquiring if Nikolai would like him seen again either virtually or in office.    DISPOSITION: Call PCP When Office is Open (overriding See PCP Within 2 Weeks)    Reason for Disposition   Arm pain is a chronic symptom (recurrent or ongoing AND present > 4 weeks)    Answer Assessment - Initial Assessment Questions  1. ONSET: \"When did the pain start?\"      Ongoing     2. LOCATION: \"Where is the pain located?\"      Arm pain    3. PAIN: \"How bad is the pain?\" (Scale 0-10; or none, mild, moderate, severe)      Significant    5. CAUSE: \"What do you think is causing the arm pain?\"      Recently seen for arthritis.     6. OTHER SYMPTOMS: \"Do you have any other symptoms?\" (e.g., neck pain, swelling, rash, fever, numbness, weakness)      Patient states he needs an extended doctor's note    Protocols used: Arm Pain-Adult-AH    "

## 2025-03-24 NOTE — PATIENT COMMUNICATION
Patient called to follow up on the [employer] message he sent this morning. Patient is in need of an extended work note to cover today, return to work 3/25/25. Please provide note via [employer]. If patient needs to be seen again, please advise.

## 2025-03-24 NOTE — LETTER
March 24, 2025     Patient: Kel Amin  YOB: 1985  Date of Visit: 3/21/2025      To Whom it May Concern:    Kel Amin is under my professional care. Kel was seen in my office on 3/21/2025. Kel may return to work on 3/25/2025 .    If you have any questions or concerns, please don't hesitate to call.         Sincerely,          Nikolai Vicente PA-C        CC: No Recipients

## 2025-05-03 DIAGNOSIS — I31.9 PERICARDITIS: ICD-10-CM

## 2025-05-03 DIAGNOSIS — J45.20 MILD INTERMITTENT ASTHMA WITHOUT COMPLICATION: ICD-10-CM

## 2025-05-04 RX ORDER — DICLOFENAC SODIUM 75 MG/1
75 TABLET, DELAYED RELEASE ORAL 2 TIMES DAILY
Qty: 60 TABLET | Refills: 5 | Status: SHIPPED | OUTPATIENT
Start: 2025-05-04

## 2025-05-04 RX ORDER — COLCHICINE 0.6 MG/1
0.6 TABLET ORAL 2 TIMES DAILY
Qty: 60 TABLET | Refills: 5 | Status: SHIPPED | OUTPATIENT
Start: 2025-05-04

## 2025-05-15 ENCOUNTER — OFFICE VISIT (OUTPATIENT)
Dept: FAMILY MEDICINE CLINIC | Facility: CLINIC | Age: 40
End: 2025-05-15
Payer: COMMERCIAL

## 2025-05-15 VITALS
HEIGHT: 75 IN | HEART RATE: 88 BPM | RESPIRATION RATE: 16 BRPM | WEIGHT: 310 LBS | OXYGEN SATURATION: 96 % | DIASTOLIC BLOOD PRESSURE: 88 MMHG | BODY MASS INDEX: 38.54 KG/M2 | SYSTOLIC BLOOD PRESSURE: 114 MMHG | TEMPERATURE: 98.4 F

## 2025-05-15 DIAGNOSIS — M79.89 SWELLING OF BOTH HANDS: Primary | ICD-10-CM

## 2025-05-15 PROCEDURE — 99214 OFFICE O/P EST MOD 30 MIN: CPT | Performed by: NURSE PRACTITIONER

## 2025-05-15 RX ORDER — PREDNISONE 10 MG/1
TABLET ORAL
Qty: 30 TABLET | Refills: 0 | Status: SHIPPED | OUTPATIENT
Start: 2025-05-15

## 2025-05-15 RX ORDER — CLINDAMYCIN HYDROCHLORIDE 300 MG/1
300 CAPSULE ORAL 3 TIMES DAILY
Qty: 21 CAPSULE | Refills: 0 | Status: SHIPPED | OUTPATIENT
Start: 2025-05-15 | End: 2025-05-22

## 2025-05-15 NOTE — ASSESSMENT & PLAN NOTE
Patient symptoms are consistent with another cellulitis infection of the hand however, I would like to rule out an autoimmune cause for his recurrent swelling.  Lab work was ordered to assess for this.  I will also have the patient complete x-rays of the bilateral hands.  Referrals were placed to rheumatology and orthopedic surgery for further management of his symptoms.  A 7-day course of clindamycin was ordered to cover for infection.  Prednisone taper was ordered to help with pain and swelling.  Orders:  •  C-reactive protein; Future  •  Sedimentation rate, automated; Future  •  RHEUMATOID FACTOR; Future  •  VIRIDIANA Screen w/Reflex Cascade; Future  •  XR hand 3+ vw left; Future  •  XR hand 3+ vw right; Future  •  Ambulatory Referral to Rheumatology; Future  •  predniSONE 10 mg tablet; Use 5 tablets for 2 days. Use 4 tablets for 2 days. Use 3 tablets for 2 days. Use 2 tablets for 2 days. Use 1 tablet for 2 days.  •  Ambulatory Referral to Orthopedic Surgery; Future  •  CBC and differential; Future  •  clindamycin (CLEOCIN) 300 MG capsule; Take 1 capsule (300 mg total) by mouth 3 (three) times a day for 7 days

## 2025-05-15 NOTE — PROGRESS NOTES
Name: Kel Amin      : 1985      MRN: [id number]  Encounter Provider: SHAYLA Davis  Encounter Date: 5/15/2025   Encounter department: [facility] PRIMARY CARE  :  Assessment & Plan  Swelling of both hands  Patient symptoms are consistent with another cellulitis infection of the hand however, I would like to rule out an autoimmune cause for his recurrent swelling.  Lab work was ordered to assess for this.  I will also have the patient complete x-rays of the bilateral hands.  Referrals were placed to rheumatology and orthopedic surgery for further management of his symptoms.  A 7-day course of clindamycin was ordered to cover for infection.  Prednisone taper was ordered to help with pain and swelling.  Orders:  •  C-reactive protein; Future  •  Sedimentation rate, automated; Future  •  RHEUMATOID FACTOR; Future  •  VIRIDIANA Screen w/Reflex Cascade; Future  •  XR hand 3+ vw left; Future  •  XR hand 3+ vw right; Future  •  Ambulatory Referral to Rheumatology; Future  •  predniSONE 10 mg tablet; Use 5 tablets for 2 days. Use 4 tablets for 2 days. Use 3 tablets for 2 days. Use 2 tablets for 2 days. Use 1 tablet for 2 days.  •  Ambulatory Referral to Orthopedic Surgery; Future  •  CBC and differential; Future  •  clindamycin (CLEOCIN) 300 MG capsule; Take 1 capsule (300 mg total) by mouth 3 (three) times a day for 7 days          Depression Screening and Follow-up Plan: Patient was screened for depression during today's encounter. They screened negative with a PHQ-2 score of 0.        History of Present Illness   Right hand swelling: Patient was seen in the office in 2025 for swelling and redness of the left hand.  He was diagnosed with cellulitis of the left hand at that time and was treated with a course of Bactrim and prednisone.  He then returned to the office again on 3/21/2025 for continued swelling of the left hand.  Bactrim was discontinued at that time and the patient was started  "on a course of Augmentin for treatment.  Patient also had lab work completed which showed a normal uric acid level and negative Lyme level.  The patient does report that after the second course of antibiotics his symptoms did resolve.  Patient is now reporting swelling of the right hand.  He denies any recent cuts, scrapes, or injuries to the right hand.  He does report that he does have decreased range of motion of the right hand and it is painful for him to move his fingers.  He also reports noting redness and warmth to touch on the dorsal aspect of the hand.      Review of Systems   Constitutional:  Negative for appetite change, chills and fever.   HENT:  Negative for ear pain and sore throat.    Eyes:  Negative for pain and visual disturbance.   Respiratory:  Negative for cough, chest tightness and shortness of breath.    Cardiovascular:  Negative for chest pain, palpitations and leg swelling.   Gastrointestinal:  Negative for abdominal pain, constipation, diarrhea, nausea and vomiting.   Endocrine: Negative for cold intolerance and heat intolerance.   Genitourinary:  Negative for decreased urine volume, difficulty urinating, dysuria and hematuria.   Musculoskeletal:  Positive for arthralgias (right hand) and joint swelling (right hand). Negative for back pain and myalgias.   Skin:  Negative for color change, rash and wound.        Hand swelling   Allergic/Immunologic: Negative for environmental allergies.   Neurological:  Negative for dizziness, seizures, syncope, light-headedness and headaches.   Hematological:  Negative for adenopathy.   Psychiatric/Behavioral:  Negative for confusion, dysphoric mood and sleep disturbance. The patient is not nervous/anxious.    All other systems reviewed and are negative.      Objective   /88 (BP Location: Left arm, Patient Position: Sitting, Cuff Size: Adult)   Pulse 88   Temp 98.4 °F (36.9 °C) (Temporal)   Resp 16   Ht 6' 3\" (1.905 m)   Wt (!) 141 kg (310 lb)   " SpO2 96%   BMI 38.75 kg/m²      Physical Exam  Vitals and nursing note reviewed.   Constitutional:       General: He is not in acute distress.     Appearance: Normal appearance. He is well-developed. He is not ill-appearing.   HENT:      Head: Normocephalic.     Eyes:      Conjunctiva/sclera: Conjunctivae normal.       Cardiovascular:      Rate and Rhythm: Normal rate and regular rhythm.      Pulses: Normal pulses.           Carotid pulses are 2+ on the right side and 2+ on the left side.       Radial pulses are 2+ on the right side and 2+ on the left side.        Posterior tibial pulses are 2+ on the right side and 2+ on the left side.      Heart sounds: Normal heart sounds. No murmur heard.  Pulmonary:      Effort: Pulmonary effort is normal. No respiratory distress.      Breath sounds: Normal breath sounds. No decreased breath sounds, wheezing, rhonchi or rales.   Abdominal:      General: Abdomen is flat. There is no distension.      Palpations: Abdomen is soft.      Tenderness: There is no abdominal tenderness. There is no guarding.     Musculoskeletal:         General: No swelling.      Right hand: Swelling and tenderness present. Decreased range of motion.      Cervical back: Normal range of motion and neck supple.      Right lower leg: No edema.      Left lower leg: No edema.      Comments: The dorsal aspect of the right hand was swollen, red, and warm to touch.  Patient did have noted decreased range of motion of the hand and pain with movement of the hand.     Skin:     General: Skin is warm and dry.      Capillary Refill: Capillary refill takes less than 2 seconds.     Neurological:      General: No focal deficit present.      Mental Status: He is alert and oriented to person, place, and time.     Psychiatric:         Mood and Affect: Mood normal.         Behavior: Behavior normal.         Thought Content: Thought content normal.         Judgment: Judgment normal.

## 2025-08-04 DIAGNOSIS — I31.9 PERICARDITIS: ICD-10-CM

## 2025-08-06 RX ORDER — COLCHICINE 0.6 MG/1
0.6 TABLET ORAL 2 TIMES DAILY
Qty: 60 TABLET | Refills: 0 | Status: SHIPPED | OUTPATIENT
Start: 2025-08-06